# Patient Record
Sex: FEMALE | Race: WHITE | Employment: UNEMPLOYED | URBAN - METROPOLITAN AREA
[De-identification: names, ages, dates, MRNs, and addresses within clinical notes are randomized per-mention and may not be internally consistent; named-entity substitution may affect disease eponyms.]

---

## 2017-09-01 ENCOUNTER — OFFICE VISIT (OUTPATIENT)
Dept: FAMILY MEDICINE CLINIC | Age: 22
End: 2017-09-01

## 2017-09-01 VITALS
RESPIRATION RATE: 16 BRPM | TEMPERATURE: 97.9 F | HEART RATE: 89 BPM | BODY MASS INDEX: 36.75 KG/M2 | HEIGHT: 65 IN | DIASTOLIC BLOOD PRESSURE: 81 MMHG | OXYGEN SATURATION: 98 % | WEIGHT: 220.6 LBS | SYSTOLIC BLOOD PRESSURE: 130 MMHG

## 2017-09-01 DIAGNOSIS — J45.20 MILD INTERMITTENT ASTHMA WITHOUT COMPLICATION: ICD-10-CM

## 2017-09-01 DIAGNOSIS — E55.9 UNSPECIFIED VITAMIN D DEFICIENCY: Primary | ICD-10-CM

## 2017-09-01 DIAGNOSIS — G89.29 CHRONIC MIDLINE LOW BACK PAIN, WITH SCIATICA PRESENCE UNSPECIFIED: Primary | ICD-10-CM

## 2017-09-01 DIAGNOSIS — Z00.00 ANNUAL PHYSICAL EXAM: ICD-10-CM

## 2017-09-01 DIAGNOSIS — M54.5 CHRONIC MIDLINE LOW BACK PAIN, WITH SCIATICA PRESENCE UNSPECIFIED: Primary | ICD-10-CM

## 2017-09-01 PROCEDURE — 99203 OFFICE O/P NEW LOW 30 MIN: CPT | Performed by: NURSE PRACTITIONER

## 2017-09-01 RX ORDER — ALBUTEROL SULFATE 90 UG/1
2 AEROSOL, METERED RESPIRATORY (INHALATION) EVERY 6 HOURS PRN
Qty: 1 INHALER | Refills: 3 | Status: SHIPPED | OUTPATIENT
Start: 2017-09-01

## 2017-09-01 RX ORDER — METFORMIN HYDROCHLORIDE 500 MG/1
500 TABLET, EXTENDED RELEASE ORAL
COMMUNITY
Start: 2017-06-14 | End: 2017-09-01 | Stop reason: ALTCHOICE

## 2017-09-01 RX ORDER — DOCOSANOL 100 MG/G
CREAM TOPICAL
COMMUNITY
Start: 2017-01-08 | End: 2017-09-01 | Stop reason: ALTCHOICE

## 2017-09-01 RX ORDER — ETONOGESTREL AND ETHINYL ESTRADIOL 11.7; 2.7 MG/1; MG/1
1 INSERT, EXTENDED RELEASE VAGINAL
COMMUNITY
Start: 2017-05-19 | End: 2017-09-01 | Stop reason: ALTCHOICE

## 2017-09-01 RX ORDER — VALACYCLOVIR HYDROCHLORIDE 1 G/1
2000 TABLET, FILM COATED ORAL
COMMUNITY
Start: 2017-09-01 | End: 2017-09-01 | Stop reason: ALTCHOICE

## 2017-09-01 RX ORDER — DIPHENHYDRAMINE HYDROCHLORIDE 25 MG/1
25 CAPSULE ORAL
COMMUNITY
Start: 2016-06-15 | End: 2017-09-01 | Stop reason: ALTCHOICE

## 2017-09-01 RX ORDER — BACLOFEN 10 MG/1
10 TABLET ORAL 3 TIMES DAILY
Qty: 30 TABLET | Refills: 0 | Status: SHIPPED | OUTPATIENT
Start: 2017-09-01 | End: 2017-09-11

## 2017-09-01 RX ORDER — DICLOFENAC SODIUM 75 MG/1
75 TABLET, DELAYED RELEASE ORAL 2 TIMES DAILY
Qty: 30 TABLET | Refills: 1 | Status: SHIPPED | OUTPATIENT
Start: 2017-09-01 | End: 2018-08-08

## 2017-09-01 RX ORDER — BUTALBITAL, ACETAMINOPHEN AND CAFFEINE 50; 325; 40 MG/1; MG/1; MG/1
TABLET ORAL
COMMUNITY
Start: 2016-10-31 | End: 2017-09-01 | Stop reason: ALTCHOICE

## 2017-09-01 RX ORDER — ALBUTEROL SULFATE 90 UG/1
1 AEROSOL, METERED RESPIRATORY (INHALATION)
COMMUNITY
Start: 2016-04-08 | End: 2017-09-01 | Stop reason: ALTCHOICE

## 2017-09-01 RX ORDER — MULTIVITAMIN
1 TABLET ORAL
COMMUNITY
End: 2017-09-01 | Stop reason: ALTCHOICE

## 2017-09-01 RX ORDER — IBUPROFEN 600 MG/1
600 TABLET ORAL
COMMUNITY
Start: 2017-01-04 | End: 2017-09-01 | Stop reason: ALTCHOICE

## 2017-09-01 RX ORDER — SERTRALINE HYDROCHLORIDE 25 MG/1
25 TABLET, FILM COATED ORAL
COMMUNITY
Start: 2017-02-13 | End: 2017-09-01

## 2017-09-01 ASSESSMENT — ENCOUNTER SYMPTOMS
ALLERGIC/IMMUNOLOGIC NEGATIVE: 1
RESPIRATORY NEGATIVE: 1
WHEEZING: 0
ABDOMINAL PAIN: 0
EYES NEGATIVE: 1
SHORTNESS OF BREATH: 0
GASTROINTESTINAL NEGATIVE: 1
BOWEL INCONTINENCE: 0
BACK PAIN: 1
CHEST TIGHTNESS: 0

## 2017-09-01 ASSESSMENT — PATIENT HEALTH QUESTIONNAIRE - PHQ9
SUM OF ALL RESPONSES TO PHQ QUESTIONS 1-9: 1
2. FEELING DOWN, DEPRESSED OR HOPELESS: 1
SUM OF ALL RESPONSES TO PHQ9 QUESTIONS 1 & 2: 1

## 2017-09-15 ENCOUNTER — OFFICE VISIT (OUTPATIENT)
Dept: FAMILY MEDICINE CLINIC | Age: 22
End: 2017-09-15

## 2017-09-15 VITALS
OXYGEN SATURATION: 96 % | DIASTOLIC BLOOD PRESSURE: 86 MMHG | HEART RATE: 86 BPM | SYSTOLIC BLOOD PRESSURE: 112 MMHG | BODY MASS INDEX: 36.65 KG/M2 | HEIGHT: 65 IN | WEIGHT: 220 LBS

## 2017-09-15 DIAGNOSIS — R10.31 RIGHT LOWER QUADRANT ABDOMINAL PAIN: ICD-10-CM

## 2017-09-15 DIAGNOSIS — R10.31 RIGHT LOWER QUADRANT ABDOMINAL PAIN: Primary | ICD-10-CM

## 2017-09-15 DIAGNOSIS — R19.7 DIARRHEA, UNSPECIFIED TYPE: ICD-10-CM

## 2017-09-15 LAB
A/G RATIO: 2 (ref 1.1–2.2)
ALBUMIN SERPL-MCNC: 4.4 G/DL (ref 3.4–5)
ALP BLD-CCNC: 104 U/L (ref 40–129)
ALT SERPL-CCNC: 14 U/L (ref 10–40)
AMYLASE: 53 U/L (ref 25–115)
ANION GAP SERPL CALCULATED.3IONS-SCNC: 14 MMOL/L (ref 3–16)
AST SERPL-CCNC: 14 U/L (ref 15–37)
BILIRUB SERPL-MCNC: 0.3 MG/DL (ref 0–1)
BUN BLDV-MCNC: 10 MG/DL (ref 7–20)
CALCIUM SERPL-MCNC: 9.6 MG/DL (ref 8.3–10.6)
CHLORIDE BLD-SCNC: 105 MMOL/L (ref 99–110)
CO2: 25 MMOL/L (ref 21–32)
CREAT SERPL-MCNC: 0.6 MG/DL (ref 0.6–1.1)
GFR AFRICAN AMERICAN: >60
GFR NON-AFRICAN AMERICAN: >60
GLOBULIN: 2.2 G/DL
GLUCOSE BLD-MCNC: 86 MG/DL (ref 70–99)
HCT VFR BLD CALC: 41.2 % (ref 36–48)
HEMOGLOBIN: 13.6 G/DL (ref 12–16)
LIPASE: 25 U/L (ref 13–60)
MCH RBC QN AUTO: 28.8 PG (ref 26–34)
MCHC RBC AUTO-ENTMCNC: 33 G/DL (ref 31–36)
MCV RBC AUTO: 87.3 FL (ref 80–100)
PDW BLD-RTO: 14.3 % (ref 12.4–15.4)
PLATELET # BLD: 171 K/UL (ref 135–450)
PMV BLD AUTO: 10.1 FL (ref 5–10.5)
POTASSIUM SERPL-SCNC: 4.4 MMOL/L (ref 3.5–5.1)
RBC # BLD: 4.72 M/UL (ref 4–5.2)
SODIUM BLD-SCNC: 144 MMOL/L (ref 136–145)
TOTAL PROTEIN: 6.6 G/DL (ref 6.4–8.2)
WBC # BLD: 6.1 K/UL (ref 4–11)

## 2017-09-15 PROCEDURE — 99214 OFFICE O/P EST MOD 30 MIN: CPT | Performed by: NURSE PRACTITIONER

## 2017-09-15 RX ORDER — PROMETHAZINE HYDROCHLORIDE 25 MG/1
25 TABLET ORAL EVERY 6 HOURS PRN
Qty: 30 TABLET | Refills: 0 | Status: SHIPPED | OUTPATIENT
Start: 2017-09-15 | End: 2017-09-22

## 2017-09-15 ASSESSMENT — ENCOUNTER SYMPTOMS
ABDOMINAL PAIN: 1
NAUSEA: 1
EYES NEGATIVE: 1
ALLERGIC/IMMUNOLOGIC NEGATIVE: 1
FLATUS: 1
VOMITING: 0
DIARRHEA: 1
RESPIRATORY NEGATIVE: 1
BLOATING: 1

## 2017-09-22 ENCOUNTER — OFFICE VISIT (OUTPATIENT)
Dept: FAMILY MEDICINE CLINIC | Age: 22
End: 2017-09-22

## 2017-09-22 VITALS
RESPIRATION RATE: 16 BRPM | WEIGHT: 224.6 LBS | BODY MASS INDEX: 37.42 KG/M2 | OXYGEN SATURATION: 98 % | HEART RATE: 84 BPM | HEIGHT: 65 IN | SYSTOLIC BLOOD PRESSURE: 103 MMHG | TEMPERATURE: 98 F | DIASTOLIC BLOOD PRESSURE: 76 MMHG

## 2017-09-22 DIAGNOSIS — R10.31 RIGHT LOWER QUADRANT ABDOMINAL PAIN: Primary | ICD-10-CM

## 2017-09-22 PROCEDURE — 99213 OFFICE O/P EST LOW 20 MIN: CPT | Performed by: NURSE PRACTITIONER

## 2017-09-22 ASSESSMENT — ENCOUNTER SYMPTOMS
HEMATOCHEZIA: 0
ABDOMINAL PAIN: 1
CONSTIPATION: 0
DIARRHEA: 0
EYES NEGATIVE: 1
NAUSEA: 0
WHEEZING: 0
RESPIRATORY NEGATIVE: 1
SHORTNESS OF BREATH: 0
ALLERGIC/IMMUNOLOGIC NEGATIVE: 1
BELCHING: 1
CRAMPS: 1
VOMITING: 0
FLATUS: 1

## 2017-09-27 ENCOUNTER — HOSPITAL ENCOUNTER (OUTPATIENT)
Dept: ULTRASOUND IMAGING | Age: 22
Discharge: HOME | End: 2017-09-27
Attending: NURSE PRACTITIONER | Admitting: NURSE PRACTITIONER

## 2017-09-27 DIAGNOSIS — R10.31 RIGHT LOWER QUADRANT ABDOMINAL PAIN: ICD-10-CM

## 2017-09-27 DIAGNOSIS — R19.7 DIARRHEA, UNSPECIFIED TYPE: ICD-10-CM

## 2017-09-27 DIAGNOSIS — R10.31 RIGHT LOWER QUADRANT PAIN: ICD-10-CM

## 2017-10-13 ENCOUNTER — OFFICE VISIT (OUTPATIENT)
Dept: FAMILY MEDICINE CLINIC | Age: 22
End: 2017-10-13

## 2017-10-13 VITALS
HEART RATE: 80 BPM | DIASTOLIC BLOOD PRESSURE: 74 MMHG | RESPIRATION RATE: 16 BRPM | BODY MASS INDEX: 37.94 KG/M2 | OXYGEN SATURATION: 98 % | SYSTOLIC BLOOD PRESSURE: 108 MMHG | WEIGHT: 227.7 LBS | HEIGHT: 65 IN | TEMPERATURE: 97.8 F

## 2017-10-13 DIAGNOSIS — R10.10 UPPER ABDOMINAL PAIN: Primary | ICD-10-CM

## 2017-10-13 PROCEDURE — 99213 OFFICE O/P EST LOW 20 MIN: CPT | Performed by: NURSE PRACTITIONER

## 2017-10-13 RX ORDER — METFORMIN HYDROCHLORIDE 500 MG/1
500 TABLET, FILM COATED, EXTENDED RELEASE ORAL
COMMUNITY
Start: 2017-06-14 | End: 2017-10-13 | Stop reason: ALTCHOICE

## 2017-10-13 RX ORDER — PANTOPRAZOLE SODIUM 40 MG/1
40 TABLET, DELAYED RELEASE ORAL DAILY
Qty: 30 TABLET | Refills: 1 | Status: SHIPPED | OUTPATIENT
Start: 2017-10-13 | End: 2018-02-12 | Stop reason: SDUPTHER

## 2017-10-13 ASSESSMENT — ENCOUNTER SYMPTOMS
DIARRHEA: 0
EYES NEGATIVE: 1
ALLERGIC/IMMUNOLOGIC NEGATIVE: 1
COUGH: 0
NAUSEA: 0
SHORTNESS OF BREATH: 0
VOMITING: 0
RESPIRATORY NEGATIVE: 1
CHEST TIGHTNESS: 0
ABDOMINAL PAIN: 1

## 2017-10-13 NOTE — PATIENT INSTRUCTIONS
Patient Education        Abdominal Pain: Care Instructions  Your Care Instructions    Abdominal pain has many possible causes. Some aren't serious and get better on their own in a few days. Others need more testing and treatment. If your pain continues or gets worse, you need to be rechecked and may need more tests to find out what is wrong. You may need surgery to correct the problem. Don't ignore new symptoms, such as fever, nausea and vomiting, urination problems, pain that gets worse, and dizziness. These may be signs of a more serious problem. Your doctor may have recommended a follow-up visit in the next 8 to 12 hours. If you are not getting better, you may need more tests or treatment. The doctor has checked you carefully, but problems can develop later. If you notice any problems or new symptoms, get medical treatment right away. Follow-up care is a key part of your treatment and safety. Be sure to make and go to all appointments, and call your doctor if you are having problems. It's also a good idea to know your test results and keep a list of the medicines you take. How can you care for yourself at home? · Rest until you feel better. · To prevent dehydration, drink plenty of fluids, enough so that your urine is light yellow or clear like water. Choose water and other caffeine-free clear liquids until you feel better. If you have kidney, heart, or liver disease and have to limit fluids, talk with your doctor before you increase the amount of fluids you drink. · If your stomach is upset, eat mild foods, such as rice, dry toast or crackers, bananas, and applesauce. Try eating several small meals instead of two or three large ones. · Wait until 48 hours after all symptoms have gone away before you have spicy foods, alcohol, and drinks that contain caffeine. · Do not eat foods that are high in fat. · Avoid anti-inflammatory medicines such as aspirin, ibuprofen (Advil, Motrin), and naproxen (Aleve).

## 2017-10-24 ENCOUNTER — OFFICE VISIT (OUTPATIENT)
Dept: FAMILY MEDICINE CLINIC | Age: 22
End: 2017-10-24

## 2017-10-24 VITALS
HEIGHT: 65 IN | WEIGHT: 224 LBS | OXYGEN SATURATION: 98 % | SYSTOLIC BLOOD PRESSURE: 130 MMHG | BODY MASS INDEX: 37.32 KG/M2 | HEART RATE: 76 BPM | DIASTOLIC BLOOD PRESSURE: 84 MMHG

## 2017-10-24 DIAGNOSIS — J01.90 ACUTE BACTERIAL SINUSITIS: Primary | ICD-10-CM

## 2017-10-24 DIAGNOSIS — B96.89 ACUTE BACTERIAL SINUSITIS: Primary | ICD-10-CM

## 2017-10-24 PROCEDURE — 1036F TOBACCO NON-USER: CPT | Performed by: NURSE PRACTITIONER

## 2017-10-24 PROCEDURE — G8417 CALC BMI ABV UP PARAM F/U: HCPCS | Performed by: NURSE PRACTITIONER

## 2017-10-24 PROCEDURE — G8427 DOCREV CUR MEDS BY ELIG CLIN: HCPCS | Performed by: NURSE PRACTITIONER

## 2017-10-24 PROCEDURE — 99214 OFFICE O/P EST MOD 30 MIN: CPT | Performed by: NURSE PRACTITIONER

## 2017-10-24 PROCEDURE — G8484 FLU IMMUNIZE NO ADMIN: HCPCS | Performed by: NURSE PRACTITIONER

## 2017-10-24 RX ORDER — AZITHROMYCIN 250 MG/1
TABLET, FILM COATED ORAL
Qty: 1 PACKET | Refills: 0 | Status: SHIPPED | OUTPATIENT
Start: 2017-10-24 | End: 2017-11-03

## 2017-10-24 RX ORDER — ONDANSETRON 4 MG/1
4 TABLET, FILM COATED ORAL DAILY PRN
Qty: 30 TABLET | Refills: 3 | Status: SHIPPED | OUTPATIENT
Start: 2017-10-24 | End: 2018-02-12 | Stop reason: ALTCHOICE

## 2017-10-24 RX ORDER — BENZONATATE 100 MG/1
100 CAPSULE ORAL 3 TIMES DAILY PRN
Qty: 30 CAPSULE | Refills: 1 | Status: SHIPPED | OUTPATIENT
Start: 2017-10-24 | End: 2017-11-10 | Stop reason: ALTCHOICE

## 2017-10-24 ASSESSMENT — ENCOUNTER SYMPTOMS
SHORTNESS OF BREATH: 0
HEARTBURN: 0
COUGH: 1
SORE THROAT: 0
SINUS PAIN: 1

## 2017-10-24 NOTE — PROGRESS NOTES
Subjective:      Chief Complaint   Patient presents with    Nausea & Vomiting     diarrhea    Chest Congestion       Patient ID: Ludmila Rivas is a 23 y.o. female who presents for     HPI    Family History   Problem Relation Age of Onset    Substance Abuse Mother     Asthma Mother     Substance Abuse Father     Heart Attack Maternal Grandmother     Heart Disease Maternal Grandmother     High Blood Pressure Maternal Grandmother     No Known Problems Maternal Grandfather     High Blood Pressure Paternal Grandmother     Heart Disease Paternal Grandmother     Stroke Paternal Grandmother     Atrial Fibrillation Paternal Grandmother     Heart Attack Paternal Grandmother     No Known Problems Paternal Grandfather     Breast Cancer Neg Hx     Colon Cancer Neg Hx     Diabetes Neg Hx        Social History     Social History    Marital status: Single     Spouse name: N/A    Number of children: N/A    Years of education: N/A     Occupational History    Not on file. Social History Main Topics    Smoking status: Never Smoker    Smokeless tobacco: Never Used    Alcohol use No    Drug use: No    Sexual activity: Yes     Partners: Male      Comment: condoms     Other Topics Concern    Not on file     Social History Narrative    No narrative on file       Current Outpatient Prescriptions on File Prior to Visit   Medication Sig Dispense Refill    pantoprazole (PROTONIX) 40 MG tablet Take 1 tablet by mouth daily 30 tablet 1    albuterol sulfate HFA (VENTOLIN HFA) 108 (90 Base) MCG/ACT inhaler Inhale 2 puffs into the lungs every 6 hours as needed for Wheezing 1 Inhaler 3    diclofenac (VOLTAREN) 75 MG EC tablet Take 1 tablet by mouth 2 times daily 30 tablet 1     No current facility-administered medications on file prior to visit. ROS    Objective:     Physical Exam    Assessment:     No diagnosis found. Plan:     No orders of the defined types were placed in this encounter.       No orders of the defined types were placed in this encounter.

## 2017-10-24 NOTE — PATIENT INSTRUCTIONS
Thank you for enrolling in [address] Please follow the instructions below to securely access your online medical record. [employer] allows you to send messages to your doctor, view your test results, renew your prescriptions, schedule appointments, and more. How Do I Sign Up? 1. In your Internet browser, go to [email or web address]  2. Click on the Sign Up Now link in the Sign In box. You will see the New Member Sign Up page. 3. Enter your [employer] Access Code exactly as it appears below. You will not need to use this code after youve completed the sign-up process. If you do not sign up before the expiration date, you must request a new code. [employer] Access Code: Activation code not generated  Current [employer] Status: Active    4. Enter your Social Security Number ([id number]) and Date of Birth (mm/dd/yyyy) as indicated and click Submit. You will be taken to the next sign-up page. 5. Create a [employer] ID. This will be your [employer] login ID and cannot be changed, so think of one that is secure and easy to remember. 6. Create a [employer] password. You can change your password at any time. 7. Enter your Password Reset Question and Answer. This can be used at a later time if you forget your password. 8. Enter your e-mail address. You will receive e-mail notification when new information is available in [address] 9. Click Sign Up. You can now view your medical record. Additional Information  If you have questions, please contact your physician practice where you receive care. Remember, [employer] is NOT to be used for urgent needs. For medical emergencies, dial 911.

## 2017-10-24 NOTE — PROGRESS NOTES
File Prior to Visit   Medication Sig Dispense Refill    pantoprazole (PROTONIX) 40 MG tablet Take 1 tablet by mouth daily 30 tablet 1    albuterol sulfate HFA (VENTOLIN HFA) 108 (90 Base) MCG/ACT inhaler Inhale 2 puffs into the lungs every 6 hours as needed for Wheezing 1 Inhaler 3    diclofenac (VOLTAREN) 75 MG EC tablet Take 1 tablet by mouth 2 times daily 30 tablet 1     No current facility-administered medications on file prior to visit. Review of Systems   Constitutional: Negative for chills and fever. HENT: Positive for congestion, postnasal drip and sinus pain. Negative for ear pain and sore throat. Sinus pain at maxillary and frontal lobes   Respiratory: Positive for cough. Negative for shortness of breath. History of asthma treated with albuterol   Cardiovascular: Negative for chest pain. Gastrointestinal: Negative for heartburn. Genitourinary: Negative. Musculoskeletal: Positive for myalgias (Treated withdiclofenac). Neurological: Positive for headaches. Psychiatric/Behavioral: Negative. Objective:     Physical Exam   Constitutional: She is oriented to person, place, and time. She appears well-developed and well-nourished. HENT:   Head: Normocephalic and atraumatic. Right Ear: External ear normal.   Left Ear: External ear normal.   Nose: Nose normal.   Mouth/Throat: Oropharynx is clear and moist.   Eyes: Conjunctivae and EOM are normal. Pupils are equal, round, and reactive to light. Right eye exhibits no discharge. Left eye exhibits no discharge. No scleral icterus. Sinus pressure pain at maxillary and frontal lobes   Neck: Normal range of motion. Neck supple. Cardiovascular: Normal rate, regular rhythm and normal heart sounds. Exam reveals no gallop and no friction rub. No murmur heard. Pulmonary/Chest: Effort normal and breath sounds normal. She has no wheezes. She has no rales. Abdominal: Soft. Musculoskeletal: Normal range of motion. Neurological: She is alert and oriented to person, place, and time. Skin: Skin is warm and dry. Psychiatric: She has a normal mood and affect. Her behavior is normal. Judgment and thought content normal.       Assessment:       ICD-10-CM ICD-9-CM    1.  Acute bacterial sinusitis J01.90 461.9     B96.89           Plan:     Zithromax  Tessalon Perles for cough  Mucinex or equivalent  Zofran for nausea  Call on Friday if symptoms do not cem  Follow-up with Sanjay Browne nurse practitioner

## 2017-11-01 ENCOUNTER — TELEPHONE (OUTPATIENT)
Dept: FAMILY MEDICINE CLINIC | Age: 22
End: 2017-11-01

## 2017-11-01 RX ORDER — AMOXICILLIN AND CLAVULANATE POTASSIUM 875; 125 MG/1; MG/1
1 TABLET, FILM COATED ORAL 2 TIMES DAILY
Qty: 20 TABLET | Refills: 0 | Status: SHIPPED | OUTPATIENT
Start: 2017-11-01 | End: 2017-11-10 | Stop reason: ALTCHOICE

## 2017-11-01 NOTE — TELEPHONE ENCOUNTER
Patient stated that the z-pack has not cleared up stated that when she woke up this morning she was still congested

## 2017-11-01 NOTE — TELEPHONE ENCOUNTER
She should continue all the other care that [facility] prescribed. I will switch the antibiotic and send it to  pharmacy. IF you are not better after this she should make an appointment to see me.

## 2017-11-10 ENCOUNTER — OFFICE VISIT (OUTPATIENT)
Dept: FAMILY MEDICINE CLINIC | Age: 22
End: 2017-11-10

## 2017-11-10 VITALS
RESPIRATION RATE: 16 BRPM | TEMPERATURE: 97.6 F | BODY MASS INDEX: 37.32 KG/M2 | HEART RATE: 97 BPM | SYSTOLIC BLOOD PRESSURE: 119 MMHG | WEIGHT: 224 LBS | OXYGEN SATURATION: 98 % | DIASTOLIC BLOOD PRESSURE: 76 MMHG | HEIGHT: 65 IN

## 2017-11-10 DIAGNOSIS — K21.9 GASTROESOPHAGEAL REFLUX DISEASE, ESOPHAGITIS PRESENCE NOT SPECIFIED: ICD-10-CM

## 2017-11-10 DIAGNOSIS — R10.11 RIGHT UPPER QUADRANT ABDOMINAL PAIN: Primary | ICD-10-CM

## 2017-11-10 PROCEDURE — 99213 OFFICE O/P EST LOW 20 MIN: CPT | Performed by: NURSE PRACTITIONER

## 2017-11-10 PROCEDURE — 1036F TOBACCO NON-USER: CPT | Performed by: NURSE PRACTITIONER

## 2017-11-10 PROCEDURE — G8417 CALC BMI ABV UP PARAM F/U: HCPCS | Performed by: NURSE PRACTITIONER

## 2017-11-10 PROCEDURE — G8427 DOCREV CUR MEDS BY ELIG CLIN: HCPCS | Performed by: NURSE PRACTITIONER

## 2017-11-10 PROCEDURE — G8484 FLU IMMUNIZE NO ADMIN: HCPCS | Performed by: NURSE PRACTITIONER

## 2017-11-10 RX ORDER — RANITIDINE 150 MG/1
150 TABLET ORAL 2 TIMES DAILY
Qty: 60 TABLET | Refills: 3 | Status: SHIPPED | OUTPATIENT
Start: 2017-11-10 | End: 2018-08-08

## 2017-11-10 ASSESSMENT — ENCOUNTER SYMPTOMS
CONSTIPATION: 0
RESPIRATORY NEGATIVE: 1
ABDOMINAL PAIN: 1
EYES NEGATIVE: 1
BLOOD IN STOOL: 0
NAUSEA: 0
ALLERGIC/IMMUNOLOGIC NEGATIVE: 1
DIARRHEA: 0
VOMITING: 0

## 2017-11-10 NOTE — PROGRESS NOTES
The medications were discussed with the patient and the physician. Prescription and over the counter medicines reviewed. Pt is taking medication as prescribed  any side effects or barriers such as difficulty in taking the medication reveiwed. The purpose, side effects, dose of medication of new medications were explained to the patient and questions answered by the physician. The patients understands the information concerning medication. Individual treatment plan developed:Self-management plan and goals developed and discussed. Resources given. See patient instructions. Medication treatment plan developed by the physician. See orders. Healthy behavior discussed: Preventative behaviors discussed regarding healthy diet, exercise, and not smoking. .    Self management confidence of patient being able to put goal into action assessed: medium confidence. Barriers to treatment evaluated:none unless otherwise noted in the HPI.

## 2017-11-10 NOTE — PROGRESS NOTES
11/10/2017    This is a 23 y.o. female   Chief Complaint   Patient presents with    Gastroesophageal Reflux    Abdominal Pain     still there but getting better    . Dora Mauricio is here for recurrent abdomen pain. She notes that the symptoms are improving since starting the protonix. Her worst sugars are spicy and acidic foods. There is no diarrhea nausea or vomiting. There is no blood in her stool. The frequency of pains has also decreased. Patient Active Problem List   Diagnosis    High-risk pregnancy    Bilateral polycystic ovarian syndrome    Need for rhogam due to Rh negative mother    Chlamydia    Migraine headache    Screening for malignant neoplasm of cervix    Vitamin D deficiency       Current Outpatient Prescriptions   Medication Sig Dispense Refill    ranitidine (ZANTAC) 150 MG tablet Take 1 tablet by mouth 2 times daily 60 tablet 3    ondansetron (ZOFRAN) 4 MG tablet Take 1 tablet by mouth daily as needed for Nausea or Vomiting 30 tablet 3    pantoprazole (PROTONIX) 40 MG tablet Take 1 tablet by mouth daily 30 tablet 1    albuterol sulfate HFA (VENTOLIN HFA) 108 (90 Base) MCG/ACT inhaler Inhale 2 puffs into the lungs every 6 hours as needed for Wheezing 1 Inhaler 3    diclofenac (VOLTAREN) 75 MG EC tablet Take 1 tablet by mouth 2 times daily 30 tablet 1     No current facility-administered medications for this visit. Allergies   Allergen Reactions    No Known Allergies        /76 (Site: Left Arm, Position: Sitting, Cuff Size: Medium Adult)   Pulse 97   Temp 97.6 °F (36.4 °C) (Oral)   Resp 16   Ht 5' 5\" (1.651 m)   Wt 224 lb (101.6 kg)   SpO2 98%   Breastfeeding? No   BMI 37.28 kg/m²     Social History   Substance Use Topics    Smoking status: Never Smoker    Smokeless tobacco: Never Used    Alcohol use No       Review of Systems   Constitutional: Negative. HENT: Negative. Eyes: Negative. Respiratory: Negative. Cardiovascular: Negative.

## 2017-11-22 ENCOUNTER — OFFICE VISIT (OUTPATIENT)
Dept: FAMILY MEDICINE CLINIC | Age: 22
End: 2017-11-22

## 2017-11-22 VITALS
WEIGHT: 224 LBS | BODY MASS INDEX: 37.32 KG/M2 | TEMPERATURE: 98 F | OXYGEN SATURATION: 98 % | HEART RATE: 88 BPM | SYSTOLIC BLOOD PRESSURE: 115 MMHG | DIASTOLIC BLOOD PRESSURE: 74 MMHG | RESPIRATION RATE: 16 BRPM | HEIGHT: 65 IN

## 2017-11-22 DIAGNOSIS — F51.5 NIGHTMARES: ICD-10-CM

## 2017-11-22 DIAGNOSIS — F41.9 ANXIETY: Primary | ICD-10-CM

## 2017-11-22 PROCEDURE — G8427 DOCREV CUR MEDS BY ELIG CLIN: HCPCS | Performed by: NURSE PRACTITIONER

## 2017-11-22 PROCEDURE — G8417 CALC BMI ABV UP PARAM F/U: HCPCS | Performed by: NURSE PRACTITIONER

## 2017-11-22 PROCEDURE — G8484 FLU IMMUNIZE NO ADMIN: HCPCS | Performed by: NURSE PRACTITIONER

## 2017-11-22 PROCEDURE — 99213 OFFICE O/P EST LOW 20 MIN: CPT | Performed by: NURSE PRACTITIONER

## 2017-11-22 PROCEDURE — 1036F TOBACCO NON-USER: CPT | Performed by: NURSE PRACTITIONER

## 2017-11-22 RX ORDER — SERTRALINE HYDROCHLORIDE 25 MG/1
25 TABLET, FILM COATED ORAL DAILY
Qty: 30 TABLET | Refills: 0 | Status: SHIPPED | OUTPATIENT
Start: 2017-11-22 | End: 2018-01-03 | Stop reason: SDUPTHER

## 2017-11-22 ASSESSMENT — ENCOUNTER SYMPTOMS
EYES NEGATIVE: 1
GASTROINTESTINAL NEGATIVE: 1
RESPIRATORY NEGATIVE: 1

## 2017-11-22 NOTE — PATIENT INSTRUCTIONS
Start zoloft- daily  Patient Education          sertraline  Pronunciation:  SER tra darrell  Brand:  Zoloft  What is the most important information I should know about sertraline? You should not use sertraline if you also take pimozide, or if you are being treated with methylene blue injection. Do not use sertraline if you have taken an MAO inhibitor in the past 14 days. A dangerous drug interaction could occur. MAO inhibitors include isocarboxazid, linezolid, phenelzine, rasagiline, selegiline, and tranylcypromine. Some young people have thoughts about suicide when first taking an antidepressant. Stay alert to changes in your mood or symptoms. Report any new or worsening symptoms to your doctor. Do not give sertraline to anyone younger than 25years old without the advice of a doctor. Sertraline is FDA-approved for children with obsessive-compulsive disorder (OCD). It is not approved for treating depression in children. What is sertraline? Sertraline is an antidepressant in a group of drugs called selective serotonin reuptake inhibitors (SSRIs). Sertraline affects chemicals in the brain that may be unbalanced in people with depression, panic, anxiety, or obsessive-compulsive symptoms. Sertraline is used to treat depression, obsessive-compulsive disorder, panic disorder, anxiety disorders, post-traumatic stress disorder (PTSD), and premenstrual dysphoric disorder (PMDD). Sertraline may also be used for purposes not listed in this medication guide. What should I discuss with my healthcare provider before taking sertraline? You should not use sertraline if you are allergic to it, if you also take pimozide, or if you are being treated with methylene blue injection. Do not use sertraline if you have taken an MAO inhibitor in the past 14 days. A dangerous drug interaction could occur. MAO inhibitors include isocarboxazid, linezolid, phenelzine, rasagiline, selegiline, and tranylcypromine.  After you stop taking sertraline, you must wait at least 14 days before you start taking an MAOI. To make sure sertraline is safe for you, tell your doctor if you have:  · liver or kidney disease;  · seizures or epilepsy;  · a bleeding or blood clotting disorder;  · bipolar disorder (manic depression); or  · a history of drug abuse or suicidal thoughts. Some young people have thoughts about suicide when first taking an antidepressant. Your doctor should check your progress at regular visits. Your family or other caregivers should also be alert to changes in your mood or symptoms. Taking an SSRI antidepressant during pregnancy may cause serious lung problems or other complications in the baby. However, you may have a relapse of depression if you stop taking your antidepressant. Tell your doctor right away if you become pregnant. Do not start or stop taking this medicine during pregnancy without your doctor's advice. It is not known whether sertraline passes into breast milk or if it could harm a nursing baby. Tell your doctor if you are breast-feeding a baby. Do not give sertraline to anyone younger than 25years old without the advice of a doctor. Sertraline is FDA-approved for children with obsessive-compulsive disorder (OCD). It is not approved for treating depression in children. How should I take sertraline? Follow all directions on your prescription label. Your doctor may occasionally change your dose to make sure you get the best results. Do not take this medicine in larger or smaller amounts or for longer than recommended. Sertraline may be taken with or without food. Try to take the medicine at the same time each day. The liquid (oral concentrate) form of sertraline must be diluted before you take it. To be sure you get the correct dose, measure the liquid with the medicine dropper provided. Mix the dose with 4 ounces (one-half cup) of water, ginger ale, lemon/lime soda, lemonade, or orange juice.  Do not use any other liquids to dilute the medicine. Stir this mixture and drink all of it right away. To make sure you get the entire dose, add a little more water to the same glass, swirl gently and drink right away. This medicine can cause you to have a false positive drug screening test. If you provide a urine sample for drug screening, tell the laboratory staff that you are taking sertraline. It may take up to 4 weeks before your symptoms improve. Keep using the medication as directed and tell your doctor if your symptoms do not improve. Do not stop using sertraline suddenly, or you could have unpleasant withdrawal symptoms. Ask your doctor how to safely stop using sertraline. Store at room temperature away from moisture and heat. What happens if I miss a dose? Take the missed dose as soon as you remember. Skip the missed dose if it is almost time for your next scheduled dose. Do not take extra medicine to make up the missed dose. What happens if I overdose? Seek emergency medical attention or call the Poison Help line at 1-882.996.2847. What should I avoid while taking sertraline? Ask your doctor before taking a nonsteroidal anti-inflammatory drug (NSAID) for pain, arthritis, fever, or swelling. This includes aspirin, ibuprofen (Advil, Motrin), naproxen (Aleve), celecoxib (Celebrex), diclofenac, indomethacin, meloxicam, and others. Using an NSAID with sertraline may cause you to bruise or bleed easily. Drinking alcohol can increase certain side effects of sertraline. Do not take the liquid form of sertraline if you are taking disulfiram (Antabuse). Liquid sertraline may contain alcohol and you could have a severe reaction to the disulfiram.  This medication may impair your thinking or reactions. Be careful if you drive or do anything that requires you to be alert. What are the possible side effects of sertraline?   Get emergency medical help if you have signs of an allergic reaction: skin rash or hives (with or without --warfarin, Coumadin, Jantoven;  · heart rhythm medicine --digoxin, flecainide, propafenone, and others;  · medicine to treat anxiety, mood disorders, or mental illness such as schizophrenia --diazepam, lithium, valproate, and others; or  · migraine headache medicines --sumatriptan, zolmitriptan, and others. This list is not complete. Other drugs may interact with sertraline, including prescription and over-the-counter medicines, vitamins, and herbal products. Not all possible interactions are listed in this medication guide. Where can I get more information? Your pharmacist can provide more information about sertraline. Remember, keep this and all other medicines out of the reach of children, never share your medicines with others, and use this medication only for the indication prescribed. Every effort has been made to ensure that the information provided by [address] is accurate, up-to-date, and complete, but no guarantee is made to that effect. Drug information contained herein may be time sensitive. [facility] information has been compiled for use by healthcare practitioners and consumers in the [place] and therefore [facility] does not warrant that uses outside of the [place] are appropriate, unless specifically indicated otherwise. [facility]'s drug information does not endorse drugs, diagnose patients or recommend therapy. [facility] drug information is an informational resource designed to assist licensed healthcare practitioners in caring for their patients and/or to serve consumers viewing this service as a supplement to, and not a substitute for, the expertise, skill, knowledge and judgment of healthcare practitioners. The absence of a warning for a given drug or drug combination in no way should be construed to indicate that the drug or drug combination is safe, effective or appropriate for any given patient.  [facility] does not assume any responsibility for any aspect of

## 2018-01-03 ENCOUNTER — OFFICE VISIT (OUTPATIENT)
Dept: FAMILY MEDICINE CLINIC | Age: 23
End: 2018-01-03

## 2018-01-03 VITALS
BODY MASS INDEX: 40.39 KG/M2 | TEMPERATURE: 98.1 F | HEIGHT: 65 IN | SYSTOLIC BLOOD PRESSURE: 106 MMHG | HEART RATE: 93 BPM | WEIGHT: 242.4 LBS | DIASTOLIC BLOOD PRESSURE: 72 MMHG | OXYGEN SATURATION: 97 % | RESPIRATION RATE: 16 BRPM

## 2018-01-03 DIAGNOSIS — B00.1 COLD SORE: ICD-10-CM

## 2018-01-03 DIAGNOSIS — R45.89 DEPRESSED MOOD: Primary | ICD-10-CM

## 2018-01-03 PROCEDURE — G8484 FLU IMMUNIZE NO ADMIN: HCPCS | Performed by: NURSE PRACTITIONER

## 2018-01-03 PROCEDURE — G8427 DOCREV CUR MEDS BY ELIG CLIN: HCPCS | Performed by: NURSE PRACTITIONER

## 2018-01-03 PROCEDURE — G8417 CALC BMI ABV UP PARAM F/U: HCPCS | Performed by: NURSE PRACTITIONER

## 2018-01-03 PROCEDURE — 1036F TOBACCO NON-USER: CPT | Performed by: NURSE PRACTITIONER

## 2018-01-03 PROCEDURE — 99213 OFFICE O/P EST LOW 20 MIN: CPT | Performed by: NURSE PRACTITIONER

## 2018-01-03 RX ORDER — VALACYCLOVIR HYDROCHLORIDE 1 G/1
1000 TABLET, FILM COATED ORAL 2 TIMES DAILY
Qty: 20 TABLET | Refills: 0 | Status: SHIPPED | OUTPATIENT
Start: 2018-01-03 | End: 2018-01-13

## 2018-01-03 RX ORDER — SERTRALINE HYDROCHLORIDE 25 MG/1
25 TABLET, FILM COATED ORAL DAILY
Qty: 30 TABLET | Refills: 0 | Status: SHIPPED | OUTPATIENT
Start: 2018-01-03 | End: 2018-01-25 | Stop reason: ALTCHOICE

## 2018-01-03 ASSESSMENT — ENCOUNTER SYMPTOMS
EYES NEGATIVE: 1
RESPIRATORY NEGATIVE: 1
GASTROINTESTINAL NEGATIVE: 1
ALLERGIC/IMMUNOLOGIC NEGATIVE: 1

## 2018-01-03 NOTE — PROGRESS NOTES
The medications were discussed with the patient and the physician. Prescription and over the counter medicines reviewed. Pt is taking medication as prescribed  any side effects or barriers such as difficulty in taking the medication reveiwed. The purpose, side effects, dose of medication of new medications were explained to the patient and questions answered by the physician. The patients understands the information concerning medication. Individual treatment plan developed:Self-management plan and goals developed and discussed. Resources given. See patient instructions. Medication treatment plan developed by the physician. See orders. Healthy behavior discussed: Preventative behaviors discussed regarding healthy diet, exercise, and not smoking. Self management confidence of patient being able to put goal into action assessed: medium confidence. Barriers to treatment evaluated:none unless otherwise noted in the HPI.
Lymphadenopathy:     She has no cervical adenopathy. Neurological: She is alert and oriented to person, place, and time. She exhibits normal muscle tone. Coordination normal.   Skin: Skin is warm and dry. No rash noted. She is not diaphoretic. No erythema. No pallor. Psychiatric: Her speech is normal and behavior is normal. Judgment and thought content normal. Her mood appears anxious. Cognition and memory are normal. She exhibits a depressed mood. She expresses no suicidal ideation. She expresses no suicidal plans. Some dark thoughts, no plan   Nursing note and vitals reviewed. Diagnosis    ICD-10-CM ICD-9-CM    1. Depressed mood F32.9 311    2. Cold sore B00.1 054. 9         Plan    HSV: valrex called in to pharmacy  Discussed L lysine for prevention    Depression: pt to take medication consistently  Call referral for CBT/counseling  Discussed grief due to the death of her mother    No orders of the defined types were placed in this encounter. Orders Placed This Encounter   Medications    sertraline (ZOLOFT) 25 MG tablet     Sig: Take 1 tablet by mouth daily     Dispense:  30 tablet     Refill:  0    valACYclovir (VALTREX) 1 g tablet     Sig: Take 1 tablet by mouth 2 times daily for 10 days     Dispense:  20 tablet     Refill:  0       Patient Education:  Names of counselors given  Pt to [employer]    Return in about 4 weeks (around 1/31/2018) for Mental health.

## 2018-01-25 ENCOUNTER — OFFICE VISIT (OUTPATIENT)
Dept: FAMILY MEDICINE CLINIC | Age: 23
End: 2018-01-25

## 2018-01-25 VITALS
SYSTOLIC BLOOD PRESSURE: 102 MMHG | WEIGHT: 241.4 LBS | TEMPERATURE: 97.7 F | BODY MASS INDEX: 40.22 KG/M2 | HEART RATE: 83 BPM | OXYGEN SATURATION: 98 % | HEIGHT: 65 IN | RESPIRATION RATE: 16 BRPM | DIASTOLIC BLOOD PRESSURE: 75 MMHG

## 2018-01-25 DIAGNOSIS — R07.9 CHEST PAIN, UNSPECIFIED TYPE: Primary | ICD-10-CM

## 2018-01-25 DIAGNOSIS — Z63.79 STRESS DUE TO ILLNESS OF FAMILY MEMBER: ICD-10-CM

## 2018-01-25 DIAGNOSIS — F43.21 GRIEF REACTION: ICD-10-CM

## 2018-01-25 PROCEDURE — 1036F TOBACCO NON-USER: CPT | Performed by: NURSE PRACTITIONER

## 2018-01-25 PROCEDURE — G8427 DOCREV CUR MEDS BY ELIG CLIN: HCPCS | Performed by: NURSE PRACTITIONER

## 2018-01-25 PROCEDURE — 99213 OFFICE O/P EST LOW 20 MIN: CPT | Performed by: NURSE PRACTITIONER

## 2018-01-25 PROCEDURE — G8484 FLU IMMUNIZE NO ADMIN: HCPCS | Performed by: NURSE PRACTITIONER

## 2018-01-25 PROCEDURE — G8417 CALC BMI ABV UP PARAM F/U: HCPCS | Performed by: NURSE PRACTITIONER

## 2018-01-25 PROCEDURE — 93000 ELECTROCARDIOGRAM COMPLETE: CPT | Performed by: NURSE PRACTITIONER

## 2018-01-25 RX ORDER — PAROXETINE HYDROCHLORIDE 20 MG/1
20 TABLET, FILM COATED ORAL EVERY MORNING
Qty: 30 TABLET | Refills: 0 | Status: SHIPPED | OUTPATIENT
Start: 2018-01-25 | End: 2018-02-27 | Stop reason: SDUPTHER

## 2018-01-25 ASSESSMENT — ENCOUNTER SYMPTOMS
CHEST TIGHTNESS: 1
WHEEZING: 0
COUGH: 0
GASTROINTESTINAL NEGATIVE: 1
EYES NEGATIVE: 1
SHORTNESS OF BREATH: 0
ALLERGIC/IMMUNOLOGIC NEGATIVE: 1

## 2018-01-25 NOTE — PATIENT INSTRUCTIONS
life while you sleep. Missing sleep can lead to illness and make it harder for you to deal with your grief. · Eat healthy foods. Try to avoid eating only foods that give you comfort. Ask someone to join you for a meal if you do not like eating alone. Consider taking a multivitamin every day. · Get some exercise every day. Even a walk can help you deal with your grief. Other exercises, such as yoga, can also help you manage stress. · Comfort yourself. Take time to look at photos or use special items that make you feel better. · Stay involved in your life. Do not withdraw from the activities you enjoy. People you know at work, Scientologist, clubs, or other groups can help you get through your period of grief. · Think about joining a support group to help you deal with your grief. There are many support groups to help people recover from grief. When should you call for help? Call 911 anytime you think you may need emergency care. For example, call if:  ? · You feel you cannot stop from hurting yourself or someone else. ? Watch closely for changes in your health, and be sure to contact your doctor if:  ? · You think you may be depressed. ? · You do not get better as expected. Where can you learn more? Go to https://[facility] CenterpeHumanoid.Quick Hit. org and sign in to your Cherry account. Enter H249 in the Emotion Media box to learn more about \"Grief (Actual/Anticipated): Care Instructions. \"     If you do not have an account, please click on the \"Sign Up Now\" link. Current as of: September 24, 2016  Content Version: 11.5  © 2173-9334 Healthwise, Incorporated. Care instructions adapted under license by [facility] ([facility]). If you have questions about a medical condition or this instruction, always ask your healthcare professional. [place] [address] any warranty or liability for your use of this information.        Patient Education        Grief (Actual/Anticipated): Care Instructions  Your Care deal with your grief. · Eat healthy foods. Try to avoid eating only foods that give you comfort. Ask someone to join you for a meal if you do not like eating alone. Consider taking a multivitamin every day. · Get some exercise every day. Even a walk can help you deal with your grief. Other exercises, such as yoga, can also help you manage stress. · Comfort yourself. Take time to look at photos or use special items that make you feel better. · Stay involved in your life. Do not withdraw from the activities you enjoy. People you know at work, Roman Catholic, clubs, or other groups can help you get through your period of grief. · Think about joining a support group to help you deal with your grief. There are many support groups to help people recover from grief. When should you call for help? Call 911 anytime you think you may need emergency care. For example, call if:  ? · You feel you cannot stop from hurting yourself or someone else. ? Watch closely for changes in your health, and be sure to contact your doctor if:  ? · You think you may be depressed. ? · You do not get better as expected. Where can you learn more? Go to https://FONU2pepiceweb.Immunexpress. org and sign in to your Therabiol account. Enter H249 in the Ditech Communications box to learn more about \"Grief (Actual/Anticipated): Care Instructions. \"     If you do not have an account, please click on the \"Sign Up Now\" link. Current as of: September 24, 2016  Content Version: 11.5  © 4006-2206 Healthwise, Incorporated. Care instructions adapted under license by [facility] ([facility]). If you have questions about a medical condition or this instruction, always ask your healthcare professional. [place] [address] any warranty or liability for your use of this information.

## 2018-01-25 NOTE — PROGRESS NOTES
present. Exam reveals no gallop and no friction rub. No murmur heard. EKG showed normal sinus rhythm   Pulmonary/Chest: Effort normal and breath sounds normal. No respiratory distress. She has no wheezes. She has no rales. Abdominal: Soft. Bowel sounds are normal. She exhibits no distension. There is no tenderness. Musculoskeletal: Normal range of motion. She exhibits no edema or tenderness. Lymphadenopathy:     She has no cervical adenopathy. Neurological: She is alert and oriented to person, place, and time. She exhibits normal muscle tone. Coordination normal.   Skin: Skin is warm and dry. No rash noted. She is not diaphoretic. No erythema. No pallor. Psychiatric: Her speech is normal and behavior is normal. Judgment and thought content normal. Her mood appears anxious. She exhibits a depressed mood. Nursing note and vitals reviewed. Diagnosis    ICD-10-CM ICD-9-CM    1. Chest pain, unspecified type R07.9 786.50 EKG 12 Lead      ECHO Complete 2D W Doppler W Color   2. Grief reaction F43.20 309.0 Internal [facility]erral to [facility]   3. Stress due to illness of family member Z63.79 V61.49 Internal Referral to [facility])        Plan    EKG showed normal sinus rhythm  Patient is under a great deal of stress and anxiety related to multiple stressors. Will try Paxil and sodas Zoloft. Patient is actively seeking counseling  Referral placed to [facility] to help with grief and anxiety issues regarding the death of her mother as well as her child's upcoming surgeries and illnesses. Return in 2 weeks as scheduled for follow-up appointment on medications. If palpitations or chest pain repeats she should seek care in the emergency room  May consider echo.     Orders Placed This Encounter   Procedures    Internal Referral to [facility]     Referral Priority:   Routine     Referral Type:   Consult for Advice and Opinion     Requested Specialty:   Spiritual Services

## 2018-01-26 ENCOUNTER — CLINICAL DOCUMENTATION (OUTPATIENT)
Dept: SPIRITUAL SERVICES | Age: 23
End: 2018-01-26

## 2018-01-29 ENCOUNTER — CLINICAL DOCUMENTATION (OUTPATIENT)
Dept: SPIRITUAL SERVICES | Age: 23
End: 2018-01-29

## 2018-01-30 ENCOUNTER — CLINICAL DOCUMENTATION (OUTPATIENT)
Dept: SPIRITUAL SERVICES | Age: 23
End: 2018-01-30

## 2018-02-02 ENCOUNTER — CLINICAL DOCUMENTATION (OUTPATIENT)
Dept: SPIRITUAL SERVICES | Age: 23
End: 2018-02-02

## 2018-02-02 NOTE — PROGRESS NOTES
mourning the loss of both her mother and grandmother who both  within the last 10 months. The pt's grief for her mother is complicated by the strained relationship they had. However, the pt's grief is deeper for her grandmother, who raised her from age 13 to 27. Objective:       Approachable [x] Sad  [x]   Angry [] Shock   []   Calm [x] Tearful  []   Happy [] Venting Emotion []   Anxious [x] Other []      Assessment:     Emotional Distress, Grief/Loss and Life Adjustment. Meaning/Purpose: Pt derives a strong since of purpose from the care taking of her first and only child. Community: This wasn't really discussed and pt didn't freely mention anything. This  will discuss this with pt at our next meeting. Hope/Peace: Pt takes comfort that her  grandma and little sister are watching over her and her baby. Pt is hopeful that her baby is on the road to recovery. Personal Spirituality and Practices: Pt believes in heaven and was open to this  praying for her over the phone. Effects on Medical Care/ Decision Making: Pt's anxiety and fear are preventing her from taking the prescribed Zoloft. Her depression may be causing her to sleep to much. Her concern over her child is preventing her from seeking counseling. The pt seemed to be encouraged by our conversation and has agreed to meet me following her next visit with her PCP. Plan:       Outpatient Spiritual Care is planning to meet with pt on 18 after her 1pm appointment with Jannie Hudson NP and provide pt with some literature on grief, anxiety and depression at that time. Length of Encounter: 30 MINS.     Complexity of Encounter: HIGH

## 2018-02-09 ENCOUNTER — CLINICAL DOCUMENTATION (OUTPATIENT)
Dept: SPIRITUAL SERVICES | Age: 23
End: 2018-02-09

## 2018-02-12 ENCOUNTER — OFFICE VISIT (OUTPATIENT)
Dept: FAMILY MEDICINE CLINIC | Age: 23
End: 2018-02-12

## 2018-02-12 VITALS
RESPIRATION RATE: 16 BRPM | HEIGHT: 65 IN | TEMPERATURE: 97.3 F | SYSTOLIC BLOOD PRESSURE: 106 MMHG | OXYGEN SATURATION: 98 % | HEART RATE: 81 BPM | BODY MASS INDEX: 40.79 KG/M2 | WEIGHT: 244.8 LBS | DIASTOLIC BLOOD PRESSURE: 68 MMHG

## 2018-02-12 DIAGNOSIS — F32.A DEPRESSION, UNSPECIFIED DEPRESSION TYPE: ICD-10-CM

## 2018-02-12 DIAGNOSIS — R30.0 DYSURIA: ICD-10-CM

## 2018-02-12 DIAGNOSIS — K21.9 GASTROESOPHAGEAL REFLUX DISEASE, ESOPHAGITIS PRESENCE NOT SPECIFIED: ICD-10-CM

## 2018-02-12 DIAGNOSIS — J45.20 MILD INTERMITTENT ASTHMA, UNSPECIFIED WHETHER COMPLICATED: Primary | ICD-10-CM

## 2018-02-12 DIAGNOSIS — Z32.00 POSSIBLE PREGNANCY, NOT CONFIRMED: ICD-10-CM

## 2018-02-12 LAB
BILIRUBIN, POC: NORMAL
BLOOD URINE, POC: NORMAL
CLARITY, POC: NORMAL
COLOR, POC: NORMAL
CONTROL: NORMAL
GLUCOSE URINE, POC: NORMAL
KETONES, POC: NORMAL
LEUKOCYTE EST, POC: NORMAL
NITRITE, POC: NORMAL
PH, POC: 6
PREGNANCY TEST URINE, POC: NORMAL
PROTEIN, POC: NORMAL
SPECIFIC GRAVITY, POC: 1.02
UROBILINOGEN, POC: NORMAL

## 2018-02-12 PROCEDURE — 1036F TOBACCO NON-USER: CPT | Performed by: NURSE PRACTITIONER

## 2018-02-12 PROCEDURE — 81002 URINALYSIS NONAUTO W/O SCOPE: CPT | Performed by: NURSE PRACTITIONER

## 2018-02-12 PROCEDURE — G8484 FLU IMMUNIZE NO ADMIN: HCPCS | Performed by: NURSE PRACTITIONER

## 2018-02-12 PROCEDURE — G8417 CALC BMI ABV UP PARAM F/U: HCPCS | Performed by: NURSE PRACTITIONER

## 2018-02-12 PROCEDURE — 99214 OFFICE O/P EST MOD 30 MIN: CPT | Performed by: NURSE PRACTITIONER

## 2018-02-12 PROCEDURE — G8427 DOCREV CUR MEDS BY ELIG CLIN: HCPCS | Performed by: NURSE PRACTITIONER

## 2018-02-12 PROCEDURE — 81025 URINE PREGNANCY TEST: CPT | Performed by: NURSE PRACTITIONER

## 2018-02-12 RX ORDER — MONTELUKAST SODIUM 10 MG/1
10 TABLET ORAL NIGHTLY
Qty: 30 TABLET | Refills: 3 | Status: SHIPPED | OUTPATIENT
Start: 2018-02-12 | End: 2019-03-21 | Stop reason: ALTCHOICE

## 2018-02-12 RX ORDER — PANTOPRAZOLE SODIUM 40 MG/1
40 TABLET, DELAYED RELEASE ORAL DAILY
Qty: 30 TABLET | Refills: 1 | Status: SHIPPED | OUTPATIENT
Start: 2018-02-12 | End: 2018-08-08

## 2018-02-12 ASSESSMENT — ENCOUNTER SYMPTOMS
SHORTNESS OF BREATH: 0
WHEEZING: 1
CONSTIPATION: 0
DIARRHEA: 0
CHEST TIGHTNESS: 1
ABDOMINAL PAIN: 0
VOMITING: 0
NAUSEA: 1
EYES NEGATIVE: 1

## 2018-02-12 NOTE — PATIENT INSTRUCTIONS
Patient Education        Controlling Your Asthma: Care Instructions  Your Care Instructions    Asthma is a long-term condition that affects your breathing. It causes the airways that lead to the lungs to swell. During an asthma attack, the airways swell and narrow. This makes it hard to breathe. You may wheeze or cough. If you have a bad attack, you may need emergency care. There are two things to do to treat asthma. · Control asthma over the long term. · Treat attacks when they occur. You and your doctor can make an asthma action plan. It tells you what medicines you need to take every day to control asthma symptoms and what to do if you have an asthma attack. Your asthma action plan can help prevent and treat attacks. When you keep your asthma under control, you can prevent severe attacks and lasting damage to your airways. You need to treat your asthma even when you are not having symptoms. Although asthma is a lifelong disease, treatment can help control it and help you stay healthy. Follow-up care is a key part of your treatment and safety. Be sure to make and go to all appointments, and call your doctor if you are having problems. It's also a good idea to know your test results and keep a list of the medicines you take. How can you care for yourself at home? To control asthma over the long term  Medicines  Controller medicines reduce swelling in your lungs. They also prevent asthma attacks. Take your controller medicine exactly as prescribed. Talk to your doctor if you have any problems with your medicine. · Inhaled corticosteroid is a common and effective controller medicine. Using it the right way can prevent or reduce most side effects. · Take your controller medicine every day, not just when you have symptoms. It helps prevent problems before they occur. · Your doctor may prescribe another medicine that you use along with the corticosteroid. This is often a long-acting bronchodilator.  Do not work, but they may shorten the attack and help you breathe better. · Albuterol is an effective quick-relief inhaler. · Take your quick-relief medicine exactly as prescribed. · Always bring your asthma medicine with you when you travel. · You may need to use quick-relief medicine before you exercise. · Call your doctor if you think you are having a problem with your medicine. When should you call for help? Call 911 anytime you think you may need emergency care. For example, call if:  ? · You are having severe trouble breathing. ?Call your doctor now or seek immediate medical care if:  ? · Your symptoms do not get better after you have followed your asthma action plan. ? · You cough up yellow, dark brown, or bloody mucus (sputum). ? Watch closely for changes in your health, and be sure to contact your doctor if:  ? · Your coughing and wheezing get worse. ? · You need to use your quick-relief medicine on more than 2 days a week (unless it is just for exercise). ? · You need help figuring out what is triggering your asthma attacks. Where can you learn more? Go to https://Go Try It On.TearScience. org and sign in to your FlexWage [employer] account. Enter Z524 in the COZero box to learn more about \"Controlling Your Asthma: Care Instructions. \"     If you do not have an account, please click on the \"Sign Up Now\" link. Current as of: May 12, 2017  Content Version: 11.5  © 0909-7001 Healthwise, Incorporated. Care instructions adapted under license by [facility] ([facility]). If you have questions about a medical condition or this instruction, always ask your healthcare professional. [address] any warranty or liability for your use of this information.

## 2018-02-14 LAB — URINE CULTURE, ROUTINE: NORMAL

## 2018-02-15 ENCOUNTER — HOSPITAL ENCOUNTER (OUTPATIENT)
Dept: CARDIOLOGY | Age: 23
Discharge: HOME | End: 2018-02-15
Attending: NURSE PRACTITIONER | Admitting: NURSE PRACTITIONER

## 2018-02-15 DIAGNOSIS — R07.9 CHEST PAIN: ICD-10-CM

## 2018-02-15 LAB
LV EF: 55 %
LVEF MODALITY: NORMAL

## 2018-02-21 ENCOUNTER — TELEPHONE (OUTPATIENT)
Dept: FAMILY MEDICINE CLINIC | Age: 23
End: 2018-02-21

## 2018-02-21 NOTE — TELEPHONE ENCOUNTER
Pt had urine culture done last week and is calling for the results. Today her urine is bright red, nausea, chills and feels like she is going to pass out. Please call back and advise.

## 2018-02-22 ENCOUNTER — TELEPHONE (OUTPATIENT)
Dept: FAMILY MEDICINE CLINIC | Age: 23
End: 2018-02-22

## 2018-02-22 RX ORDER — ONDANSETRON 4 MG/1
4 TABLET, FILM COATED ORAL DAILY PRN
Qty: 30 TABLET | Refills: 0 | Status: SHIPPED | OUTPATIENT
Start: 2018-02-22 | End: 2018-08-08

## 2018-02-22 NOTE — TELEPHONE ENCOUNTER
Pt was advised yesterday to go to urgent care for her symptoms. She was dx with the flu. Pt is very nauseous today and would like to see if something could be called in. Dontae on file is correct. Please advise.

## 2018-02-28 RX ORDER — PAROXETINE HYDROCHLORIDE 20 MG/1
20 TABLET, FILM COATED ORAL EVERY MORNING
Qty: 30 TABLET | Refills: 0 | Status: SHIPPED | OUTPATIENT
Start: 2018-02-28 | End: 2019-03-21 | Stop reason: ALTCHOICE

## 2018-03-01 ENCOUNTER — OFFICE VISIT (OUTPATIENT)
Dept: FAMILY MEDICINE CLINIC | Age: 23
End: 2018-03-01

## 2018-03-01 VITALS
HEIGHT: 65 IN | SYSTOLIC BLOOD PRESSURE: 116 MMHG | HEART RATE: 115 BPM | OXYGEN SATURATION: 97 % | DIASTOLIC BLOOD PRESSURE: 72 MMHG | TEMPERATURE: 98.2 F | BODY MASS INDEX: 40.15 KG/M2 | RESPIRATION RATE: 16 BRPM | WEIGHT: 241 LBS

## 2018-03-01 DIAGNOSIS — J06.9 VIRAL URI: Primary | ICD-10-CM

## 2018-03-01 DIAGNOSIS — H69.82 EUSTACHIAN TUBE DYSFUNCTION, LEFT: ICD-10-CM

## 2018-03-01 DIAGNOSIS — F41.9 ANXIETY: ICD-10-CM

## 2018-03-01 DIAGNOSIS — F32.A DEPRESSION, UNSPECIFIED DEPRESSION TYPE: ICD-10-CM

## 2018-03-01 PROCEDURE — G8427 DOCREV CUR MEDS BY ELIG CLIN: HCPCS | Performed by: NURSE PRACTITIONER

## 2018-03-01 PROCEDURE — 1036F TOBACCO NON-USER: CPT | Performed by: NURSE PRACTITIONER

## 2018-03-01 PROCEDURE — 99213 OFFICE O/P EST LOW 20 MIN: CPT | Performed by: NURSE PRACTITIONER

## 2018-03-01 PROCEDURE — G8417 CALC BMI ABV UP PARAM F/U: HCPCS | Performed by: NURSE PRACTITIONER

## 2018-03-01 PROCEDURE — G8484 FLU IMMUNIZE NO ADMIN: HCPCS | Performed by: NURSE PRACTITIONER

## 2018-03-01 RX ORDER — DEXTROMETHORPHAN HYDROBROMIDE AND PROMETHAZINE HYDROCHLORIDE 15; 6.25 MG/5ML; MG/5ML
5 SYRUP ORAL 4 TIMES DAILY PRN
Qty: 120 ML | Refills: 0 | Status: SHIPPED | OUTPATIENT
Start: 2018-03-01 | End: 2018-08-08

## 2018-03-01 RX ORDER — METHYLPREDNISOLONE 4 MG/1
TABLET ORAL
Qty: 1 KIT | Refills: 0 | Status: SHIPPED | OUTPATIENT
Start: 2018-03-01 | End: 2018-08-08

## 2018-03-01 ASSESSMENT — ENCOUNTER SYMPTOMS
RESPIRATORY NEGATIVE: 1
DIARRHEA: 0
COLOR CHANGE: 0
VOMITING: 0
RHINORRHEA: 1
EYES NEGATIVE: 1
COUGH: 0
SINUS PAIN: 0
SHORTNESS OF BREATH: 0
SINUS PRESSURE: 0
GASTROINTESTINAL NEGATIVE: 1
CHEST TIGHTNESS: 0
TROUBLE SWALLOWING: 1
SORE THROAT: 1
ABDOMINAL PAIN: 0

## 2018-03-01 NOTE — PATIENT INSTRUCTIONS
[facility]  [facility]  Restart paxil- may take a few weeks for side effects to go away  Reach out to couselor

## 2018-03-13 ENCOUNTER — CLINICAL DOCUMENTATION (OUTPATIENT)
Dept: SPIRITUAL SERVICES | Age: 23
End: 2018-03-13

## 2018-06-05 ENCOUNTER — CLINICAL DOCUMENTATION (OUTPATIENT)
Dept: SPIRITUAL SERVICES | Age: 23
End: 2018-06-05

## 2018-06-15 ENCOUNTER — CLINICAL DOCUMENTATION (OUTPATIENT)
Dept: SPIRITUAL SERVICES | Age: 23
End: 2018-06-15

## 2018-08-08 ENCOUNTER — HOSPITAL ENCOUNTER (EMERGENCY)
Age: 23
Discharge: HOME | End: 2018-08-08
Payer: COMMERCIAL

## 2018-08-08 VITALS
OXYGEN SATURATION: 100 % | WEIGHT: 255 LBS | HEART RATE: 80 BPM | DIASTOLIC BLOOD PRESSURE: 83 MMHG | TEMPERATURE: 98.8 F | RESPIRATION RATE: 16 BRPM | HEIGHT: 65 IN | BODY MASS INDEX: 42.49 KG/M2 | SYSTOLIC BLOOD PRESSURE: 133 MMHG

## 2018-08-08 DIAGNOSIS — N93.9 VAGINAL BLEEDING: Primary | ICD-10-CM

## 2018-08-08 DIAGNOSIS — R11.0 NAUSEA: ICD-10-CM

## 2018-08-08 DIAGNOSIS — R53.83 MALAISE AND FATIGUE: ICD-10-CM

## 2018-08-08 DIAGNOSIS — R53.81 MALAISE AND FATIGUE: ICD-10-CM

## 2018-08-08 LAB
A/G RATIO: 1.6 (ref 1.1–2.2)
ALBUMIN SERPL-MCNC: 4.6 G/DL (ref 3.4–5)
ALP BLD-CCNC: 106 U/L (ref 40–129)
ALT SERPL-CCNC: 37 U/L (ref 10–40)
ANION GAP SERPL CALCULATED.3IONS-SCNC: 13 MMOL/L (ref 3–16)
AST SERPL-CCNC: 26 U/L (ref 15–37)
BASOPHILS ABSOLUTE: 0.1 K/UL (ref 0–0.2)
BASOPHILS RELATIVE PERCENT: 1.1 %
BILIRUB SERPL-MCNC: 0.3 MG/DL (ref 0–1)
BILIRUBIN URINE: NEGATIVE
BLOOD, URINE: ABNORMAL
BUN BLDV-MCNC: 10 MG/DL (ref 7–20)
CALCIUM SERPL-MCNC: 9.6 MG/DL (ref 8.3–10.6)
CHLORIDE BLD-SCNC: 101 MMOL/L (ref 99–110)
CLARITY: CLEAR
CO2: 23 MMOL/L (ref 21–32)
COLOR: YELLOW
COMMENT UA: ABNORMAL
CREAT SERPL-MCNC: 0.6 MG/DL (ref 0.6–1.1)
EOSINOPHILS ABSOLUTE: 0.2 K/UL (ref 0–0.6)
EOSINOPHILS RELATIVE PERCENT: 2.6 %
GFR AFRICAN AMERICAN: >60
GFR NON-AFRICAN AMERICAN: >60
GLOBULIN: 2.9 G/DL
GLUCOSE BLD-MCNC: 91 MG/DL (ref 70–99)
GLUCOSE URINE: NEGATIVE MG/DL
GONADOTROPIN, CHORIONIC (HCG) QUANT: <5 MIU/ML
HCT VFR BLD CALC: 43.3 % (ref 36–48)
HEMOGLOBIN: 15.1 G/DL (ref 12–16)
KETONES, URINE: NEGATIVE MG/DL
LEUKOCYTE ESTERASE, URINE: NEGATIVE
LYMPHOCYTES ABSOLUTE: 2 K/UL (ref 1–5.1)
LYMPHOCYTES RELATIVE PERCENT: 25.5 %
MCH RBC QN AUTO: 30.1 PG (ref 26–34)
MCHC RBC AUTO-ENTMCNC: 34.8 G/DL (ref 31–36)
MCV RBC AUTO: 86.4 FL (ref 80–100)
MICROSCOPIC EXAMINATION: YES
MONOCYTES ABSOLUTE: 0.5 K/UL (ref 0–1.3)
MONOCYTES RELATIVE PERCENT: 6.2 %
NEUTROPHILS ABSOLUTE: 5 K/UL (ref 1.7–7.7)
NEUTROPHILS RELATIVE PERCENT: 64.6 %
NITRITE, URINE: NEGATIVE
PDW BLD-RTO: 13.3 % (ref 12.4–15.4)
PH UA: 5.5
PLATELET # BLD: 179 K/UL (ref 135–450)
PMV BLD AUTO: 9.4 FL (ref 5–10.5)
POTASSIUM SERPL-SCNC: 4 MMOL/L (ref 3.5–5.1)
PROTEIN UA: NEGATIVE MG/DL
RBC # BLD: 5.01 M/UL (ref 4–5.2)
RBC UA: >100 /HPF (ref 0–2)
SODIUM BLD-SCNC: 137 MMOL/L (ref 136–145)
SPECIFIC GRAVITY UA: >=1.03
SPECIMEN STATUS: NORMAL
TOTAL PROTEIN: 7.5 G/DL (ref 6.4–8.2)
URINE TYPE: ABNORMAL
UROBILINOGEN, URINE: 0.2 E.U./DL
WBC # BLD: 7.7 K/UL (ref 4–11)
WBC UA: ABNORMAL /HPF (ref 0–5)

## 2018-08-08 PROCEDURE — 84702 CHORIONIC GONADOTROPIN TEST: CPT

## 2018-08-08 PROCEDURE — 81001 URINALYSIS AUTO W/SCOPE: CPT

## 2018-08-08 PROCEDURE — 96360 HYDRATION IV INFUSION INIT: CPT

## 2018-08-08 PROCEDURE — 80053 COMPREHEN METABOLIC PANEL: CPT

## 2018-08-08 PROCEDURE — 99283 EMERGENCY DEPT VISIT LOW MDM: CPT

## 2018-08-08 PROCEDURE — 85025 COMPLETE CBC W/AUTO DIFF WBC: CPT

## 2018-08-08 PROCEDURE — 2580000003 HC RX 258: Performed by: PHYSICIAN ASSISTANT

## 2018-08-08 RX ORDER — 0.9 % SODIUM CHLORIDE 0.9 %
1000 INTRAVENOUS SOLUTION INTRAVENOUS ONCE
Status: COMPLETED | OUTPATIENT
Start: 2018-08-08 | End: 2018-08-08

## 2018-08-08 RX ORDER — ONDANSETRON 4 MG/1
4 TABLET, ORALLY DISINTEGRATING ORAL EVERY 8 HOURS PRN
Qty: 15 TABLET | Refills: 0 | Status: SHIPPED | OUTPATIENT
Start: 2018-08-08 | End: 2019-03-21 | Stop reason: ALTCHOICE

## 2018-08-08 RX ADMIN — SODIUM CHLORIDE 1000 ML: 9 INJECTION, SOLUTION INTRAVENOUS at 13:45

## 2018-08-08 ASSESSMENT — ENCOUNTER SYMPTOMS
VOMITING: 0
ABDOMINAL PAIN: 0
EYES NEGATIVE: 1
COUGH: 0
SHORTNESS OF BREATH: 0
COLOR CHANGE: 0
NAUSEA: 1

## 2018-08-08 ASSESSMENT — PAIN DESCRIPTION - DESCRIPTORS: DESCRIPTORS: CRAMPING

## 2018-08-08 ASSESSMENT — PAIN SCALES - GENERAL: PAINLEVEL_OUTOF10: 6

## 2018-08-08 ASSESSMENT — PAIN DESCRIPTION - LOCATION: LOCATION: ABDOMEN

## 2018-08-08 NOTE — ED PROVIDER NOTES
[facility]  ED  eMERGENCY dEPARTMENT eNCOUnter        Pt Name: Tee Ling  MRN: [id number]  [place] 1995  Date of evaluation: 8/8/2018  Provider: Terell Evans PA-C  PCP: Ammon Libman, APRN - CNP  ED Attending: Elias Lopez    History provided by the patient    CHIEF COMPLAINT:     Chief Complaint   Patient presents with    Fever     since sunday she states she has ran random fevers and the highest was 101. monday morning she was spotting and then yesterday it got red. this morning she took a test and it was positive 0600, at 1000 she had a gush and been bright red ever since    Vaginal Bleeding       HISTORY OF PRESENT ILLNESS:      Tee Ling is a 26 y.o. female who arrives to the ED by private vehicle. She states she has had low-grade fevers off and on since this past Sunday, 8/5. Monday, 8/6 she has had vaginal spotting that today turned into vaginal bleeding that is comparable to a normal menstrual cycle. She has been trying to get pregnant and has been going through fertility treatments. She took a home pregnancy test today and thinks it might have been positive. With her bleeding though, she is concerned about miscarriage. Additionally, she describes fatigue and nausea. There are no identifiable exacerbating or alleviating factors to her symptoms. Nursing Notes were reviewed     REVIEW OF SYSTEMS:     Review of Systems   Constitutional: Positive for fatigue. Negative for activity change, appetite change, chills and fever. HENT: Negative. Eyes: Negative. Respiratory: Negative for cough and shortness of breath. Cardiovascular: Negative for chest pain. Gastrointestinal: Positive for nausea. Negative for abdominal pain and vomiting. Genitourinary: Positive for vaginal bleeding. Musculoskeletal: Negative for gait problem, joint swelling and neck pain. Skin: Negative for color change. Neurological: Negative for weakness, light-headedness and headaches. All other systems reviewed and are negative. Except as noted above in the ROS, all other systems were reviewed and negative.          PAST MEDICAL HISTORY:     Past Medical History:   Diagnosis Date    Anxiety     Asthma     Back pain     Depression     Headache     Ovarian torsion     PCOS (polycystic ovarian syndrome)          SURGICAL HISTORY:      Past Surgical History:   Procedure Laterality Date    CYST REMOVAL      TONSILLECTOMY AND ADENOIDECTOMY           CURRENT MEDICATIONS:       Previous Medications    ALBUTEROL SULFATE HFA (VENTOLIN HFA) 108 (90 BASE) MCG/ACT INHALER    Inhale 2 puffs into the lungs every 6 hours as needed for Wheezing    MONTELUKAST (SINGULAIR) 10 MG TABLET    Take 1 tablet by mouth nightly    PAROXETINE (PAXIL) 20 MG TABLET    TAKE 1 TABLET BY MOUTH EVERY MORNING         ALLERGIES:    No known allergies    FAMILY HISTORY:       Family History   Problem Relation Age of Onset    Substance Abuse Mother     Asthma Mother     Substance Abuse Father     Heart Attack Maternal Grandmother     Heart Disease Maternal Grandmother     High Blood Pressure Maternal Grandmother     No Known Problems Maternal Grandfather     High Blood Pressure Paternal Grandmother     Heart Disease Paternal Grandmother     Stroke Paternal Grandmother     Atrial Fibrillation Paternal Grandmother     Heart Attack Paternal Grandmother     No Known Problems Paternal Grandfather     Breast Cancer Neg Hx     Colon Cancer Neg Hx     Diabetes Neg Hx           SOCIAL HISTORY:       Social History     Social History    Marital status: Single     Spouse name: N/A    Number of children: N/A    Years of education: N/A     Social History Main Topics    Smoking status: Never Smoker    Smokeless tobacco: Never Used    Alcohol use Yes      Comment: occasional    Drug use: No    Sexual activity: Yes     Partners: Male      Comment: condoms     Other Topics Concern    None     Social History Narrative    None       SCREENINGS:             PHYSICAL EXAM:       ED Triage Vitals   BP Temp Temp Source Pulse Resp SpO2 Height Weight   08/08/18 1344 08/08/18 1344 08/08/18 1344 08/08/18 1344 08/08/18 1344 08/08/18 1344 08/08/18 1227 08/08/18 1227   94/79 98.8 °F (37.1 °C) Oral 88 16 98 % 5' 5\" (1.651 m) 255 lb (115.7 kg)       Physical Exam    CONSTITUTIONAL: Awake and alert. Cooperative. Well-developed. Well-nourished. Non-toxic. No acute distress. HENT: Normocephalic. Atraumatic. External ears normal, without discharge. No nasal discharge. Oropharynx clear. Mucous membranes moist.  EYES: Conjunctiva non-injected. No scleral icterus. PERRL. EOM's grossly intact. NECK: Supple. Normal ROM. CARDIOVASCULAR: RRR. No Murmer. Intact distal pulses. PULMONARY/CHEST WALL: Effort normal. No tachypnea. Lungs clear to ausculation. ABDOMEN: Normal BS. Soft. Nondistended. No tenderness to palpate. No guarding. /ANORECTAL: Not assessed  MUSKULOSKELETAL: Normal ROM. No acute deformities. No edema. No tenderness to palpate. SKIN: Warm and dry. NEUROLOGICAL: Alert and oriented x 3. GCS 15. CN II-XII grossly intact. Strength is 5/5 in all extremities and sensation is intact. Normal gait.    PSYCHIATRIC: Normal affect        DIAGNOSTIC RESULTS:     LABS:    Results for orders placed or performed during the hospital encounter of 08/08/18   Comprehensive Metabolic Panel   Result Value Ref Range    Sodium 137 136 - 145 mmol/L    Potassium 4.0 3.5 - 5.1 mmol/L    Chloride 101 99 - 110 mmol/L    CO2 23 21 - 32 mmol/L    Anion Gap 13 3 - 16    Glucose 91 70 - 99 mg/dL    BUN 10 7 - 20 mg/dL    CREATININE 0.6 0.6 - 1.1 mg/dL    GFR Non-African American >60 >60    GFR African American >60 >60    Calcium 9.6 8.3 - 10.6 mg/dL    Total Protein 7.5 6.4 - 8.2 g/dL    Alb 4.6 3.4 - 5.0 g/dL    Albumin/Globulin Ratio 1.6 1.1 - 2.2    Total Bilirubin 0.3 0.0 - 1.0 mg/dL    Alkaline Phosphatase 106 40 - 129 U/L    ALT 37 10 - 40 U/L    AST 26 15 - 37 U/L    Globulin 2.9 g/dL   CBC Auto Differential   Result Value Ref Range    WBC 7.7 4.0 - 11.0 K/uL    RBC 5.01 4.00 - 5.20 M/uL    Hemoglobin 15.1 12.0 - 16.0 g/dL    Hematocrit 43.3 36.0 - 48.0 %    MCV 86.4 80.0 - 100.0 fL    MCH 30.1 26.0 - 34.0 pg    MCHC 34.8 31.0 - 36.0 g/dL    RDW 13.3 12.4 - 15.4 %    Platelets 999 366 - 390 K/uL    MPV 9.4 5.0 - 10.5 fL    Neutrophils % 64.6 %    Lymphocytes % 25.5 %    Monocytes % 6.2 %    Eosinophils % 2.6 %    Basophils % 1.1 %    Neutrophils # 5.0 1.7 - 7.7 K/uL    Lymphocytes # 2.0 1.0 - 5.1 K/uL    Monocytes # 0.5 0.0 - 1.3 K/uL    Eosinophils # 0.2 0.0 - 0.6 K/uL    Basophils # 0.1 0.0 - 0.2 K/uL   Sample possible blood bank testing   Result Value Ref Range    Specimen Status JAHAIRA    Urinalysis   Result Value Ref Range    Color, UA Yellow Straw/Yellow    Clarity, UA Clear Clear    Glucose, Ur Negative Negative mg/dL    Bilirubin Urine Negative Negative    Ketones, Urine Negative Negative mg/dL    Specific Gravity, UA >=1.030 1.005 - 1.030    Blood, Urine LARGE (A) Negative    pH, UA 5.5 5.0 - 8.0    Protein, UA Negative Negative mg/dL    Urobilinogen, Urine 0.2 <2.0 E.U./dL    Nitrite, Urine Negative Negative    Leukocyte Esterase, Urine Negative Negative    Microscopic Examination YES     Urine Type Not Specified    HCG, Quantitative, Pregnancy   Result Value Ref Range    hCG Quant <5.0 <5.0 mIU/mL   Microscopic Urinalysis   Result Value Ref Range    WBC, UA see below 0 - 5 /HPF    RBC, UA >100 (A) 0 - 2 /HPF    Urinalysis Comments see below          RADIOLOGY:    NONE      PROCEDURES:   N/A    CRITICAL CARE TIME:   N/A    CONSULTS:  None      EMERGENCY DEPARTMENT COURSE and DIFFERENTIAL DIAGNOSIS/MDM:   Vitals:    Vitals:    08/08/18 1227 08/08/18 1344 08/08/18 1459   BP:  94/79 133/83   Pulse:  88 80   Resp:  16    Temp:  98.8 °F (37.1 °C)    TempSrc:  Oral    SpO2:  98% 100%   Weight: 255 lb (115.7 kg)     Height: 5' 5\" (1.651 m) Rowena Edwards 122    Schedule an appointment as soon as possible for a visit         DISCHARGE MEDICATIONS:  New Prescriptions    ONDANSETRON (ZOFRAN ODT) 4 MG DISINTEGRATING TABLET    Take 1 tablet by mouth every 8 hours as needed for Nausea                  (Please note that portions of this note were completed with a voice recognition program.  Efforts were made to edit the dictations, but occasionally words are mis-transcribed.)    Terell Evans PA-C (electronically signed)              Salvador Negrete [facility]  08/08/18 [id number]

## 2018-08-08 NOTE — ED NOTES
- pt given with discharge instructions, rx, and materials. - instructions, rx, and follow up appointments reviewed with patient/family. No further questions posed. - vs and pt stable upon discharge. - pt ambulatory to [facility].        Rafael Schroeder RN  08/08/18 [id number]

## 2018-08-09 ENCOUNTER — CLINICAL DOCUMENTATION (OUTPATIENT)
Dept: SPIRITUAL SERVICES | Age: 23
End: 2018-08-09

## 2018-08-09 NOTE — PROGRESS NOTES
8/9/2018  3:00pm/1500    [facility]patient [facility] mailed closing letter to patient. Contact information for Outpatient [facility] provided if patient desires spiritual care in the future.

## 2018-10-25 NOTE — PROGRESS NOTES
10/13/2017    This is a 23 y.o. female   Chief Complaint   Patient presents with    Abdominal Pain     still the same    . Guy De La Cruz is here for follow-up on abdominal pain. She had a negative workup for pancreatitis as well as gallstones. She was instructed at her last visit to bring a food diary as spicy and fried foods for her triggers to her upper abdominal pain. Current complaints include unchanged upper right quadrant abdominal pain. This occurs just intermitently and is not related to food every time. She does not notice any particular food triggers. Patient Active Problem List   Diagnosis    High-risk pregnancy    Bilateral polycystic ovarian syndrome    Need for rhogam due to Rh negative mother    Chlamydia    Migraine headache    Screening for malignant neoplasm of cervix    Unspecified vitamin D deficiency       Current Outpatient Prescriptions   Medication Sig Dispense Refill    pantoprazole (PROTONIX) 40 MG tablet Take 1 tablet by mouth daily 30 tablet 1    albuterol sulfate HFA (VENTOLIN HFA) 108 (90 Base) MCG/ACT inhaler Inhale 2 puffs into the lungs every 6 hours as needed for Wheezing 1 Inhaler 3    diclofenac (VOLTAREN) 75 MG EC tablet Take 1 tablet by mouth 2 times daily 30 tablet 1     No current facility-administered medications for this visit. Allergies   Allergen Reactions    No Known Allergies        /74 (Site: Left Arm, Position: Sitting, Cuff Size: Medium Adult)   Pulse 80   Temp 97.8 °F (36.6 °C) (Oral)   Resp 16   Ht 5' 5\" (1.651 m)   Wt 227 lb 11.2 oz (103.3 kg)   SpO2 98%   Breastfeeding? No   BMI 37.89 kg/m²     Social History   Substance Use Topics    Smoking status: Never Smoker    Smokeless tobacco: Never Used    Alcohol use No       Review of Systems   Constitutional: Negative. HENT: Negative. Eyes: Negative. Respiratory: Negative. Negative for cough, chest tightness and shortness of breath. Cardiovascular: Negative.   Negative for palpitations and leg swelling. Gastrointestinal: Positive for abdominal pain. Negative for diarrhea, nausea and vomiting. Endocrine: Negative. Genitourinary: Negative. Negative for dyspareunia, dysuria and frequency. Musculoskeletal: Negative. Skin: Negative. Allergic/Immunologic: Negative. Neurological: Negative. Hematological: Negative. Psychiatric/Behavioral: Negative. Physical Exam   Constitutional: She is oriented to person, place, and time. She appears well-developed and well-nourished. No distress. HENT:   Head: Normocephalic and atraumatic. Right Ear: External ear normal.   Left Ear: External ear normal.   Nose: Nose normal.   Mouth/Throat: Oropharynx is clear and moist. No oropharyngeal exudate. Eyes: Conjunctivae and EOM are normal. Right eye exhibits no discharge. Left eye exhibits no discharge. Neck: Normal range of motion. Neck supple. Cardiovascular: Normal rate, regular rhythm and normal heart sounds. Exam reveals no gallop and no friction rub. No murmur heard. Pulmonary/Chest: Effort normal and breath sounds normal. No respiratory distress. She has no wheezes. She has no rales. Abdominal: Soft. Bowel sounds are normal. She exhibits no distension. There is tenderness (mild epigastric). Musculoskeletal: Normal range of motion. She exhibits no edema or tenderness. Lymphadenopathy:     She has no cervical adenopathy. Neurological: She is alert and oriented to person, place, and time. She exhibits normal muscle tone. Coordination normal.   Skin: Skin is warm and dry. No rash noted. She is not diaphoretic. No erythema. No pallor. Psychiatric: She has a normal mood and affect. Her behavior is normal. Judgment and thought content normal.   Nursing note and vitals reviewed. Diagnosis    ICD-10-CM ICD-9-CM    1.  Upper abdominal pain R10.10 789.09         Plan    Continue to keep food diary  Start protonix for possible GERD/ PUD  If symptoms worsen or fail to improve call office, will place referral to GI    No orders of the defined types were placed in this encounter. Orders Placed This Encounter   Medications    pantoprazole (PROTONIX) 40 MG tablet     Sig: Take 1 tablet by mouth daily     Dispense:  30 tablet     Refill:  1         Return in about 4 weeks (around 11/10/2017) for GERD/abdominal pain . Procedure To Be Performed At Next Visit: Excision Detail Level: Simple

## 2019-03-21 ENCOUNTER — HOSPITAL ENCOUNTER (EMERGENCY)
Age: 24
Discharge: HOME | End: 2019-03-22
Attending: EMERGENCY MEDICINE
Payer: COMMERCIAL

## 2019-03-21 ENCOUNTER — APPOINTMENT (OUTPATIENT)
Dept: GENERAL RADIOLOGY | Age: 24
End: 2019-03-21
Payer: COMMERCIAL

## 2019-03-21 VITALS
WEIGHT: 255 LBS | BODY MASS INDEX: 42.43 KG/M2 | DIASTOLIC BLOOD PRESSURE: 72 MMHG | OXYGEN SATURATION: 98 % | SYSTOLIC BLOOD PRESSURE: 122 MMHG | TEMPERATURE: 98.2 F | RESPIRATION RATE: 16 BRPM | HEART RATE: 96 BPM

## 2019-03-21 DIAGNOSIS — R06.02 SHORTNESS OF BREATH: ICD-10-CM

## 2019-03-21 DIAGNOSIS — R00.2 PALPITATIONS: Primary | ICD-10-CM

## 2019-03-21 LAB
A/G RATIO: 1.9 (ref 1.1–2.2)
ALBUMIN SERPL-MCNC: 4.4 G/DL (ref 3.4–5)
ALP BLD-CCNC: 100 U/L (ref 40–129)
ALT SERPL-CCNC: 47 U/L (ref 10–40)
ANION GAP SERPL CALCULATED.3IONS-SCNC: 12 MMOL/L (ref 3–16)
AST SERPL-CCNC: 31 U/L (ref 15–37)
BILIRUB SERPL-MCNC: <0.2 MG/DL (ref 0–1)
BILIRUBIN URINE: NEGATIVE
BLOOD, URINE: NEGATIVE
BUN BLDV-MCNC: 9 MG/DL (ref 7–20)
CALCIUM SERPL-MCNC: 9.4 MG/DL (ref 8.3–10.6)
CHLORIDE BLD-SCNC: 102 MMOL/L (ref 99–110)
CLARITY: CLEAR
CO2: 25 MMOL/L (ref 21–32)
COLOR: YELLOW
CREAT SERPL-MCNC: 0.6 MG/DL (ref 0.6–1.1)
D DIMER: <200 NG/ML DDU (ref 0–229)
GFR AFRICAN AMERICAN: >60
GFR NON-AFRICAN AMERICAN: >60
GLOBULIN: 2.3 G/DL
GLUCOSE BLD-MCNC: 105 MG/DL (ref 70–99)
GLUCOSE URINE: NEGATIVE MG/DL
HCG QUALITATIVE: NEGATIVE
HCT VFR BLD CALC: 40.4 % (ref 36–48)
HEMOGLOBIN: 14 G/DL (ref 12–16)
KETONES, URINE: NEGATIVE MG/DL
LEUKOCYTE ESTERASE, URINE: NEGATIVE
MCH RBC QN AUTO: 31.7 PG (ref 26–34)
MCHC RBC AUTO-ENTMCNC: 34.8 G/DL (ref 31–36)
MCV RBC AUTO: 91 FL (ref 80–100)
MICROSCOPIC EXAMINATION: NORMAL
NITRITE, URINE: NEGATIVE
PDW BLD-RTO: 13.2 % (ref 12.4–15.4)
PH UA: 6 (ref 5–8)
PLATELET # BLD: 173 K/UL (ref 135–450)
PMV BLD AUTO: 9.3 FL (ref 5–10.5)
POTASSIUM SERPL-SCNC: 3.9 MMOL/L (ref 3.5–5.1)
PROTEIN UA: NEGATIVE MG/DL
RBC # BLD: 4.44 M/UL (ref 4–5.2)
SODIUM BLD-SCNC: 139 MMOL/L (ref 136–145)
SPECIFIC GRAVITY UA: >=1.03 (ref 1–1.03)
TOTAL PROTEIN: 6.7 G/DL (ref 6.4–8.2)
TROPONIN: <0.01 NG/ML
URINE TYPE: NORMAL
UROBILINOGEN, URINE: 0.2 E.U./DL
WBC # BLD: 9 K/UL (ref 4–11)

## 2019-03-21 PROCEDURE — 81003 URINALYSIS AUTO W/O SCOPE: CPT

## 2019-03-21 PROCEDURE — 2580000003 HC RX 258: Performed by: NURSE PRACTITIONER

## 2019-03-21 PROCEDURE — 93005 ELECTROCARDIOGRAM TRACING: CPT | Performed by: EMERGENCY MEDICINE

## 2019-03-21 PROCEDURE — 71046 X-RAY EXAM CHEST 2 VIEWS: CPT

## 2019-03-21 PROCEDURE — 84703 CHORIONIC GONADOTROPIN ASSAY: CPT

## 2019-03-21 PROCEDURE — 6370000000 HC RX 637 (ALT 250 FOR IP): Performed by: NURSE PRACTITIONER

## 2019-03-21 PROCEDURE — 85027 COMPLETE CBC AUTOMATED: CPT

## 2019-03-21 PROCEDURE — 85379 FIBRIN DEGRADATION QUANT: CPT

## 2019-03-21 PROCEDURE — 99285 EMERGENCY DEPT VISIT HI MDM: CPT

## 2019-03-21 PROCEDURE — 80053 COMPREHEN METABOLIC PANEL: CPT

## 2019-03-21 PROCEDURE — 84484 ASSAY OF TROPONIN QUANT: CPT

## 2019-03-21 RX ORDER — ACETAMINOPHEN 325 MG/1
650 TABLET ORAL ONCE
Status: COMPLETED | OUTPATIENT
Start: 2019-03-21 | End: 2019-03-21

## 2019-03-21 RX ORDER — 0.9 % SODIUM CHLORIDE 0.9 %
1000 INTRAVENOUS SOLUTION INTRAVENOUS ONCE
Status: COMPLETED | OUTPATIENT
Start: 2019-03-21 | End: 2019-03-22

## 2019-03-21 RX ADMIN — ACETAMINOPHEN 650 MG: 325 TABLET ORAL at 22:12

## 2019-03-21 RX ADMIN — SODIUM CHLORIDE 1000 ML: 9 INJECTION, SOLUTION INTRAVENOUS at 22:12

## 2019-03-21 ASSESSMENT — PAIN SCALES - GENERAL: PAINLEVEL_OUTOF10: 6

## 2019-03-22 LAB
EKG ATRIAL RATE: 98 BPM
EKG DIAGNOSIS: NORMAL
EKG P AXIS: 33 DEGREES
EKG P-R INTERVAL: 158 MS
EKG Q-T INTERVAL: 366 MS
EKG QRS DURATION: 82 MS
EKG QTC CALCULATION (BAZETT): 467 MS
EKG R AXIS: 34 DEGREES
EKG T AXIS: 43 DEGREES
EKG VENTRICULAR RATE: 98 BPM

## 2019-03-22 PROCEDURE — 93010 ELECTROCARDIOGRAM REPORT: CPT | Performed by: INTERNAL MEDICINE

## 2020-01-10 ENCOUNTER — HOSPITAL ENCOUNTER (OUTPATIENT)
Dept: ULTRASOUND IMAGING | Age: 25
Discharge: HOME | End: 2020-01-10
Payer: COMMERCIAL

## 2020-01-10 PROCEDURE — 76705 ECHO EXAM OF ABDOMEN: CPT

## 2020-07-23 ENCOUNTER — HOSPITAL ENCOUNTER (OUTPATIENT)
Dept: ULTRASOUND IMAGING | Age: 25
Discharge: HOME | End: 2020-07-23
Payer: COMMERCIAL

## 2020-07-23 PROCEDURE — 76700 US EXAM ABDOM COMPLETE: CPT

## 2020-08-10 ENCOUNTER — INITIAL CONSULT (OUTPATIENT)
Dept: GASTROENTEROLOGY | Age: 25
End: 2020-08-10
Payer: COMMERCIAL

## 2020-08-10 VITALS
SYSTOLIC BLOOD PRESSURE: 110 MMHG | DIASTOLIC BLOOD PRESSURE: 70 MMHG | WEIGHT: 266 LBS | BODY MASS INDEX: 44.32 KG/M2 | HEIGHT: 65 IN

## 2020-08-10 PROCEDURE — 99203 OFFICE O/P NEW LOW 30 MIN: CPT | Performed by: INTERNAL MEDICINE

## 2020-08-10 NOTE — PATIENT INSTRUCTIONS
Patient Education        Nonalcoholic Steatohepatitis (CASTELLANOS): Care Instructions  Your Care Instructions     Nonalcoholic steatohepatitis (CASTELLANOS) is liver inflammation. It is caused by a buildup of fat in the liver. The fat buildup is not caused by drinking alcohol. Because of the inflammation, the liver does not work as well as it should. CASTELALNOS is part of a group of liver diseases called nonalcoholic fatty liver disease. In these diseases, fat builds up in the liver and sometimes causes liver damage. This damage can get worse over time. Follow-up care is a key part of your treatment and safety. Be sure to make and go to all appointments, and call your doctor if you are having problems. It's also a good idea to know your test results and keep a list of the medicines you take. How can you care for yourself at home? · Stay at a healthy weight. Or if you need to, slowly get to a healthy weight. · Control your cholesterol. Talk to your doctor about ways to lower your cholesterol, if needed. You might try getting active, taking medicines, and making healthy changes to your diet. · Eat healthy foods. This includes fruits, vegetables, lean meats and dairy, and whole grains. · If you have diabetes, keep your blood sugar at your target level. · Get at least 30 minutes of exercise on most days of the week. Walking is a good choice. You also may want to do other activities, such as running, swimming, cycling, or playing tennis or team sports. · Limit alcohol, or do not drink. Alcohol can damage the liver and cause health problems. When should you call for help? [id number] anytime you think you may need emergency care. For example, call if:  · You have trouble breathing. · You vomit blood or what looks like coffee grounds. Call your doctor now or seek immediate medical care if:  · You feel very sleepy or confused. · You have new or worse belly pain. · You have a fever.   · There is a new or increasing yellow tint to

## 2020-08-10 NOTE — PROGRESS NOTES
[address] [facility],  [address] [place] [address] Ave  [place], [address] [facility]  Phone: Diiwopxs 2     Chief Complaint   Patient presents with    New Patient     RUQ pain, pt also complains of back pain as well       HPI     Thank you ESTELLA Alegria for asking me to see [facility] in consultation. She is a Single [1] White [1] 23 y.o. Zeke Kayw female with medical history of morbid obesity and bilateral PCOS,  seen independently with complaints of right upper quadrant abdominal pain of 6 months duration. Pain is sharp and spastic, radiates to the epigastrium and back. Associated with nausea, and episodic green colored loose stools of 2 days duration. Patient denies aggravation with food intake or alleviating factors. Denies vomiting, fever, chills, unintentional weight loss, hematochezia or melenic stools. Patient reports a 15lb intentional weight loss over the past 7 months and follows with a weight loss clinic at [facility]. A review of prior workup in 03/2019 noted normal liver chemistries with the exception of mild elevation of ALT 47. US abdomen on 01/10/2020 and 07/23/2020 noted hepatomegaly with fatty liver and mild splenomegaly; no evidence of cholelithiasis or choledocholithiasis and normal biliary tree. Pertinent PMH, FH, SH is reviewed below. Last EGD: None   Last Colonoscopy: None     Review of available records reveals:   Wt Readings from Last 50 Encounters:   08/10/20 266 lb (120.7 kg)   03/21/19 255 lb (115.7 kg)   08/08/18 255 lb (115.7 kg)   03/01/18 241 lb (109.3 kg)   02/12/18 244 lb 12.8 oz (111 kg)   01/25/18 241 lb 6.4 oz (109.5 kg)   01/03/18 242 lb 6.4 oz (110 kg)   11/22/17 224 lb (101.6 kg)   11/10/17 224 lb (101.6 kg)   10/24/17 224 lb (101.6 kg)   10/13/17 227 lb 11.2 oz (103.3 kg)   09/22/17 224 lb 9.6 oz (101.9 kg)   09/15/17 220 lb (99.8 kg)   09/01/17 220 lb 9.6 oz (100.1 kg)   08/18/17 220 lb (99.8 kg)   12/12/11 (!) 208 lb (94.3 kg) (>99 %, Z= 2.33)*     * Growth percentiles are based on CDC (Girls, 2-20 Years) data.        No components found for: HGBA1C  BP Readings from Last 3 Encounters:   08/10/20 110/70   03/21/19 122/72   08/08/18 133/83     Health Maintenance   Topic Date Due    Varicella vaccine (1 of 2 - 2-dose childhood series) 04/07/1998    Chlamydia screen  04/07/2013    Cervical cancer screen  04/07/2018    HIV screen  03/01/2028 (Originally 4/7/2012)    Flu vaccine (1) 09/01/2020    DTaP/Tdap/Td vaccine (3 - Td) 11/08/2026    HPV vaccine  Completed    Hepatitis A vaccine  Aged Out    Hepatitis B vaccine  Aged Out    Hib vaccine  Aged Out    Meningococcal (ACWY) vaccine  Aged Out    Pneumococcal 0-64 years Vaccine  Aged Out       No components found for: [employer]     PAST MEDICAL HISTORY     Past Medical History:   Diagnosis Date    Anxiety     Asthma     Back pain     Depression     Headache     Ovarian torsion     PCOS (polycystic ovarian syndrome)      FAMILY HISTORY     Family History   Problem Relation Age of Onset    Substance Abuse Mother     Asthma Mother     Substance Abuse Father     Heart Attack Maternal Grandmother     Heart Disease Maternal Grandmother     High Blood Pressure Maternal Grandmother     No Known Problems Maternal Grandfather     High Blood Pressure Paternal Grandmother     Heart Disease Paternal Grandmother     Stroke Paternal Grandmother     Atrial Fibrillation Paternal Grandmother     Heart Attack Paternal Grandmother     No Known Problems Paternal Grandfather     Breast Cancer Neg Hx     Colon Cancer Neg Hx     Diabetes Neg Hx      SOCIAL HISTORY     Social History     Socioeconomic History    Marital status: Single     Spouse name: Not on file    Number of children: Not on file    Years of education: Not on file    Highest education level: Not on file   Occupational History    Not on file   Social Needs    Financial resource strain: Not on file  Food insecurity     Worry: Not on file     Inability: Not on file    Transportation needs     Medical: Not on file     Non-medical: Not on file   Tobacco Use    Smoking status: Never Smoker    Smokeless tobacco: Never Used   Substance and Sexual Activity    Alcohol use: Yes     Comment: occasional    Drug use: No    Sexual activity: Yes     Partners: Male     Comment: condoms   Lifestyle    Physical activity     Days per week: Not on file     Minutes per session: Not on file    Stress: Not on file   Relationships    Social connections     Talks on phone: Not on file     Gets together: Not on file     Attends Restorationist service: Not on file     Active member of club or organization: Not on file     Attends meetings of clubs or organizations: Not on file     Relationship status: Not on file    Intimate partner violence     Fear of current or ex partner: Not on file     Emotionally abused: Not on file     Physically abused: Not on file     Forced sexual activity: Not on file   Other Topics Concern    Not on file   Social History Narrative    Not on file     SURGICAL HISTORY     Past Surgical History:   Procedure Laterality Date    CYST REMOVAL      TONSILLECTOMY  [address]   (This list may include medications prescribed during this encounter as epic can not insert only the list prior to this encounter.)  Current Outpatient Rx   Medication Sig Dispense Refill    albuterol sulfate HFA (VENTOLIN HFA) 108 (90 Base) MCG/ACT inhaler Inhale 2 puffs into the lungs every 6 hours as needed for Wheezing 1 Inhaler 3     ALLERGIES     Allergies   Allergen Reactions    No Known Allergies      IMMUNIZATIONS     Immunization History   Administered Date(s) Administered    HPV Quadrivalent (Gardasil) 11/20/2009, 01/22/2010, 06/28/2010    Influenza Vaccine, unspecified formulation 09/02/2013, 10/11/2014, 10/04/2016    Rho (D) Immune Globulin 05/25/2016    Td, unspecified formulation 11/08/2016    Tdap (Boostrix, Adacel) 12/29/2013     REVIEW OF SYSTEMS   See HPI for further details and pertinent postiives. Negative for the following:  Constitutional: Negative for weight change. Negative for appetite change and fatigue. HENT: Negative for nosebleeds, sore throat, mouth sores, and voice change. Respiratory: Negative for cough, choking and chest tightness. Cardiovascular: Negative for chest pain   Gastrointestinal: See HPI  Musculoskeletal: Negative for arthralgias. Skin: Negative for pallor. Neurological: Negative for weakness and light-headedness. Hematological: Negative for adenopathy. Does not bruise/bleed easily. Psychiatric/Behavioral: Negative for suicidal ideas. PHYSICAL EXAM   VITAL SIGNS: /70 (Site: Left Upper Arm, Position: Sitting, Cuff Size: Large Adult)   Ht 5' 5\" (1.651 m)   Wt 266 lb (120.7 kg)   BMI 44.26 kg/m²   Wt Readings from Last 3 Encounters:   08/10/20 266 lb (120.7 kg)   03/21/19 255 lb (115.7 kg)   08/08/18 255 lb (115.7 kg)     Constitutional: Obese female. No acute distress, Non-toxic appearance. HENT: Normocephalic, Atraumatic, Bilateral external ears normal, Oropharynx moist, No oral exudates, Nose normal.   Eyes: Conjunctiva normal, No discharge. Neck: Normal range of motion, No tenderness, Supple, No stridor. Lymphatic: No cervical, subclavian, or axillary lymphadenopathy. Cardiovascular: Normal heart rate, Normal rhythm, No murmurs, No rubs, No gallops. Thorax & Lungs: Normal breath sounds, No respiratory distress, No wheezing, No chest tenderness. Abdomen: Pannus abdomen, no hernias, normal bowel sounds, non tender and non distended. Rectal:  Deferred. Skin: Warm, Dry, No erythema, No rash. No bruising. No spider hemangiomas. Back: No tenderness, No CVA tenderness. Lower Extremities: Intact distal pulses, No edema, No tenderness, No cyanosis, No clubbing.   Neurologic: Alert & oriented x 3, Normal motor function, Normal sensory function, No focal deficits noted. No asterixis. RADIOLOGY/PROCEDURES     US abdomen 07/23/2020:   FINDINGS:    LIVER: The liver demonstrates increased echogenicity without evidence of    intrahepatic biliary ductal dilatation.         BILIARY SYSTEM: Gallbladder is unremarkable without evidence of    pericholecystic fluid, wall thickening or stones.  Negative sonographic    Calix's sign.         Common bile duct is within normal limits measuring 3 mm.         KIDNEYS: The kidneys are unremarkable in appearance without evidence of    hydronephrosis.         PANCREAS: Visualized portions of the pancreas are unremarkable.         SPLEEN: The spleen is unremarkable in appearance.  Spleen is borderline    enlarged, measuring 13.5 cm in greatest dimension.         IVC: The IVC is patent.         AORTA: Nonvisualized         OTHER: No evidence of ascites. 1. Fatty infiltration of the liver. 2. Borderline splenomegaly. FINAL IMPRESSION     Orders Placed This Encounter   Procedures    Hepatic Function Panel     Standing Status:   Future     Standing Expiration Date:   2/10/2021    LIPID PANEL     Standing Status:   Future     Standing Expiration Date:   8/10/2021     Order Specific Question:   Is Patient Fasting?/# of Hours     Answer:   Rosalia Saldivar was seen today for new patient. Diagnoses and all orders for this visit:    Fatty liver disease, nonalcoholic   Right upper quadrant abdominal pain likely due to non alcoholic fatty liver disease. To rule out non acoholic steatoheptitis. No biochemical or imaging evidence of choledocholithiasis or cholelithiasis. -     Hepatic Function Panel; Future  -     LIPID PANEL; Future  -  Counseled on weight loss with diet and exercise. Patient to has set a goal of 100lb weight loss for the next year. ORDERED FUTURE/PENDING TESTS     Lab Frequency Next Occurrence       FOLLOWUP   Return in about 3 months (around 11/10/2020).       Nelida Greenberg 8/10/20 2:14 PM EDT    CC:  ESTELLA Art

## 2020-08-25 ENCOUNTER — HOSPITAL ENCOUNTER (OUTPATIENT)
Age: 25
Discharge: HOME | End: 2020-08-25
Payer: COMMERCIAL

## 2020-08-25 LAB
ALBUMIN SERPL-MCNC: 4.6 G/DL (ref 3.4–5)
ALP BLD-CCNC: 90 U/L (ref 40–129)
ALT SERPL-CCNC: 46 U/L (ref 10–40)
AST SERPL-CCNC: 51 U/L (ref 15–37)
BILIRUB SERPL-MCNC: 0.6 MG/DL (ref 0–1)
BILIRUBIN DIRECT: <0.2 MG/DL (ref 0–0.3)
BILIRUBIN, INDIRECT: ABNORMAL MG/DL (ref 0–1)
CHOLESTEROL, TOTAL: 201 MG/DL (ref 0–199)
HDLC SERPL-MCNC: 22 MG/DL (ref 40–60)
LDL CHOLESTEROL CALCULATED: 135 MG/DL
TOTAL PROTEIN: 6.9 G/DL (ref 6.4–8.2)
TRIGL SERPL-MCNC: 222 MG/DL (ref 0–150)
VLDLC SERPL CALC-MCNC: 44 MG/DL

## 2020-08-25 PROCEDURE — 80061 LIPID PANEL: CPT

## 2020-08-25 PROCEDURE — 36415 COLL VENOUS BLD VENIPUNCTURE: CPT

## 2020-08-25 PROCEDURE — 80076 HEPATIC FUNCTION PANEL: CPT

## 2020-09-02 ENCOUNTER — TELEPHONE (OUTPATIENT)
Dept: GASTROENTEROLOGY | Age: 25
End: 2020-09-02

## 2020-09-02 NOTE — TELEPHONE ENCOUNTER
I called patient at 8:45 AM without a response. I left a voicemail explaining patient's recent labs. Recommended to call back if she has further questions.

## 2020-11-04 NOTE — PROGRESS NOTES
[address] [facility] [address], [address] [facility]  Phone: [phone]   WHY:[phone]      CHIEF COMPLAINT  Chief Complaint   Patient presents with    Follow-up     fatty liver       SUBJECTIVE  Marie Agustin is a 23 y.o. female with medical history of morbid obesity and bilateral PCOS who returns for follow-up of fatty liver. She reports a 10 pound weight loss with a change to plant-based diet. She feels more energetic in her menstrual cycles have normalized. Date of last office visit and details: 8/10/2020  23 y.o. Prema Burgos female with medical history of morbid obesity and bilateral PCOS,  seen independently with complaints of right upper quadrant abdominal pain of 6 months duration. Pain is sharp and spastic, radiates to the epigastrium and back. Associated with nausea, and episodic green colored loose stools of 2 days duration. Patient denies aggravation with food intake or alleviating factors. Denies vomiting, fever, chills, unintentional weight loss, hematochezia or melenic stools. Patient reports a 15lb intentional weight loss over the past 7 months and follows with a weight loss clinic at [address] [place] [address].  A review of prior workup in 03/2019 noted normal liver chemistries with the exception of mild elevation of ALT 47. US abdomen on 01/10/2020 and 07/23/2020 noted hepatomegaly with fatty liver and mild splenomegaly; no evidence of cholelithiasis or choledocholithiasis and normal biliary tree.       PAST MEDICAL HISTORY     Past Medical History:   Diagnosis Date    Anxiety     Asthma     Back pain     Depression     Headache     Ovarian torsion     PCOS (polycystic ovarian syndrome)      FAMILY HISTORY     Family History   Problem Relation Age of Onset    Substance Abuse Mother     Asthma Mother     Substance Abuse Father     Heart Attack Maternal Grandmother     Heart Disease Maternal Grandmother     High Blood Pressure Maternal Grandmother     No Known Problems Maternal Grandfather     High Blood Pressure Paternal Grandmother     Heart Disease Paternal Grandmother     Stroke Paternal Grandmother     Atrial Fibrillation Paternal Grandmother     Heart Attack Paternal Grandmother     No Known Problems Paternal Grandfather     Breast Cancer Neg Hx     Colon Cancer Neg Hx     Diabetes Neg Hx      SOCIAL HISTORY     Social History     Socioeconomic History    Marital status: Single     Spouse name: Not on file    Number of children: Not on file    Years of education: Not on file    Highest education level: Not on file   Occupational History    Not on file   Social Needs    Financial resource strain: Not on file    Food insecurity     Worry: Not on file     Inability: Not on file    Transportation needs     Medical: Not on file     Non-medical: Not on file   Tobacco Use    Smoking status: Never Smoker    Smokeless tobacco: Never Used   Substance and Sexual Activity    Alcohol use: Yes     Comment: occasional    Drug use: No    Sexual activity: Yes     Partners: Male     Comment: condoms   Lifestyle    Physical activity     Days per week: Not on file     Minutes per session: Not on file    Stress: Not on file   Relationships    Social connections     Talks on phone: Not on file     Gets together: Not on file     Attends Mandaeism service: Not on file     Active member of club or organization: Not on file     Attends meetings of clubs or organizations: Not on file     Relationship status: Not on file    Intimate partner violence     Fear of current or ex partner: Not on file     Emotionally abused: Not on file     Physically abused: Not on file     Forced sexual activity: Not on file   Other Topics Concern    Not on file   Social History Narrative    Not on file     SURGICAL HISTORY     Past Surgical History:   Procedure Laterality Date    CYST REMOVAL      TONSILLECTOMY AND ADENOIDECTOMY       [address]   (This list may include medications prescribed during this encounter as epic can not insert only the list prior to this encounter.)  Current Outpatient Rx   Medication Sig Dispense Refill    valACYclovir (VALTREX) 1 g tablet Take 1 tablet by mouth as needed      albuterol sulfate HFA (VENTOLIN HFA) 108 (90 Base) MCG/ACT inhaler Inhale 2 puffs into the lungs every 6 hours as needed for Wheezing 1 Inhaler 3     ALLERGIES     Allergies   Allergen Reactions    No Known Allergies      IMMUNIZATIONS     Immunization History   Administered Date(s) Administered    HPV Quadrivalent (Gardasil) 11/20/2009, 01/22/2010, 06/28/2010    Influenza Vaccine, unspecified formulation 09/02/2013, 10/11/2014, 10/04/2016    Rho (D) Immune Globulin 05/25/2016    Td, unspecified formulation 11/08/2016    Tdap (Boostrix, Adacel) 12/29/2013       REVIEW OF SYSTEMS   See HPI for further details and pertinent postiives. Negative for the following:  Constitutional: Negative for weight change. Negative for appetite change and fatigue. HENT: Negative for nosebleeds, sore throat, mouth sores, and voice change. Respiratory: Negative for cough, choking and chest tightness. Cardiovascular: Negative for chest pain   Gastrointestinal: See HPI  Musculoskeletal: Negative for arthralgias. Skin: Negative for pallor. Neurological: Negative for weakness and light-headedness. Hematological: Negative for adenopathy. Does not bruise/bleed easily. Psychiatric/Behavioral: Negative for suicidal ideas. PHYSICAL EXAM   VITAL SIGNS: /71 (Site: Right Wrist, Position: Sitting, Cuff Size: Medium Adult)   Pulse 86   Temp 97.5 °F (36.4 °C) (Temporal)   Ht 5' 5\" (1.651 m)   Wt 255 lb 9.6 oz (115.9 kg)   BMI 42.53 kg/m²   Wt Readings from Last 3 Encounters:   11/06/20 255 lb 9.6 oz (115.9 kg)   08/10/20 266 lb (120.7 kg)   03/21/19 255 lb (115.7 kg)     Constitutional: Obese female. Well developed, Well nourished, No acute distress, Non-toxic appearance.    HENT: Normocephalic, Atraumatic, Bilateral external ears normal, Oropharynx moist, No oral exudates, Nose normal.   Eyes: Conjunctiva normal, No discharge. Neck: Normal range of motion, No tenderness, Supple, No stridor. Lymphatic: No cervical, subclavian, or axillary lymphadenopathy. Cardiovascular: Normal heart rate, Normal rhythm, No murmurs, No rubs, No gallops. Thorax & Lungs: Normal breath sounds, No respiratory distress, No wheezing, No chest tenderness. .  Abdomen: Pannus abdomen, normal bowel sounds, soft, non tender, non distended, no hernias,     Rectal:  Deferred. Skin: Warm, Dry, No erythema, No rash. No bruising. No spider hemangiomas. Back: No tenderness, No CVA tenderness. Lower Extremities: Intact distal pulses, No edema, No tenderness, No cyanosis, No clubbing. Neurologic: Alert & oriented x 3, Normal motor function, Normal sensory function, No focal deficits noted. No asterixis. No results found. FINAL IMPRESSION   Anusha Yee was seen today for follow-up. Diagnoses and all orders for this visit:    Nonalcoholic fatty liver disease; to rule out other etiologies such as acute viral hepatitis, celiac disease, PBC.  -     HEPATIC FUNCTION PANEL; Future  -     HEPATITIS C ANTIBODY; Future  -     HEPATITIS A ANTIBODY, TOTAL; Future  -     HEPATITIS B CORE ANTIBODY, TOTAL; Future  -     Hepatitis B Surface Antibody; Future  -     HEPATITIS B SURFACE ANTIGEN; Future  -     BASIC METABOLIC PANEL; Future  -     CBC Auto Differential; Future  -     ARLEEN; Future  -     IgA; Future  -     Ceruloplasmin; Future  -     ALPHA-1-ANTITRYPSIN; Future  -     Mitochondrial antibodies, M2; Future  -     Anti-Neutrophilic Cytoplasmic Antibody  -     Tissue Transglutaminase, IgA; Future    Patient was encouraged on continued lifestyle modification with diet and exercise to achieve further weight loss.   Discussed need for fibrosis assessment along with regular use of vit E 400 bid and regular coffee consumption may be beneficial.          Orders Placed This Encounter   Procedures    HEPATIC FUNCTION PANEL     Standing Status:   Future     Standing Expiration Date:   11/6/2021    HEPATITIS C ANTIBODY     Standing Status:   Future     Standing Expiration Date:   11/6/2021    HEPATITIS A ANTIBODY, TOTAL     Standing Status:   Future     Standing Expiration Date:   11/6/2021    HEPATITIS B CORE ANTIBODY, TOTAL     Standing Status:   Future     Standing Expiration Date:   11/6/2021    Hepatitis B Surface Antibody     Standing Status:   Future     Standing Expiration Date:   11/6/2021    HEPATITIS B SURFACE ANTIGEN     Standing Status:   Future     Standing Expiration Date:   11/6/2021    BASIC METABOLIC PANEL     Standing Status:   Future     Standing Expiration Date:   11/6/2021    CBC Auto Differential     Standing Status:   Future     Standing Expiration Date:   12/6/2020    ARLEEN     Standing Status:   Future     Standing Expiration Date:   11/6/2021    IgA     Standing Status:   Future     Standing Expiration Date:   12/6/2020    Ceruloplasmin     Standing Status:   Future     Standing Expiration Date:   12/6/2020    ALPHA-1-ANTITRYPSIN     Standing Status:   Future     Standing Expiration Date:   11/6/2021    Mitochondrial antibodies, M2     Standing Status:   Future     Standing Expiration Date:   12/6/2020    Anti-Neutrophilic Cytoplasmic Antibody    Tissue Transglutaminase, IgA     Standing Status:   Future     Standing Expiration Date:   12/6/2020     ORDERED FUTURE/PENDING TESTS     Lab Frequency Next Occurrence       FOLLOWUP   Return in about 3 months (around 2/6/2021).           Symone Abbott 11/4/20 5:06 PM EST    CC:  ESTELLA Mathur

## 2020-11-06 ENCOUNTER — HOSPITAL ENCOUNTER (OUTPATIENT)
Age: 25
Discharge: HOME | End: 2020-11-06
Payer: COMMERCIAL

## 2020-11-06 ENCOUNTER — OFFICE VISIT (OUTPATIENT)
Dept: GASTROENTEROLOGY | Age: 25
End: 2020-11-06
Payer: COMMERCIAL

## 2020-11-06 VITALS
DIASTOLIC BLOOD PRESSURE: 71 MMHG | SYSTOLIC BLOOD PRESSURE: 114 MMHG | HEIGHT: 65 IN | WEIGHT: 255.6 LBS | HEART RATE: 86 BPM | BODY MASS INDEX: 42.59 KG/M2 | TEMPERATURE: 97.5 F

## 2020-11-06 LAB
ALBUMIN SERPL-MCNC: 4.5 G/DL (ref 3.4–5)
ALP BLD-CCNC: 99 U/L (ref 40–129)
ALT SERPL-CCNC: 46 U/L (ref 10–40)
ANION GAP SERPL CALCULATED.3IONS-SCNC: 8 MMOL/L (ref 3–16)
AST SERPL-CCNC: 38 U/L (ref 15–37)
BASOPHILS ABSOLUTE: 0.1 K/UL (ref 0–0.2)
BASOPHILS RELATIVE PERCENT: 0.7 %
BILIRUB SERPL-MCNC: 0.4 MG/DL (ref 0–1)
BILIRUBIN DIRECT: <0.2 MG/DL (ref 0–0.3)
BILIRUBIN, INDIRECT: ABNORMAL MG/DL (ref 0–1)
BUN BLDV-MCNC: 6 MG/DL (ref 7–20)
CALCIUM SERPL-MCNC: 9.6 MG/DL (ref 8.3–10.6)
CHLORIDE BLD-SCNC: 102 MMOL/L (ref 99–110)
CO2: 27 MMOL/L (ref 21–32)
CREAT SERPL-MCNC: 0.6 MG/DL (ref 0.6–1.1)
EOSINOPHILS ABSOLUTE: 0.2 K/UL (ref 0–0.6)
EOSINOPHILS RELATIVE PERCENT: 1.9 %
GFR AFRICAN AMERICAN: >60
GFR NON-AFRICAN AMERICAN: >60
GLUCOSE BLD-MCNC: 92 MG/DL (ref 70–99)
HBV SURFACE AB TITR SER: <3.5 MIU/ML
HCT VFR BLD CALC: 43.3 % (ref 36–48)
HEMOGLOBIN: 14.7 G/DL (ref 12–16)
HEPATITIS B SURFACE ANTIGEN INTERPRETATION: NORMAL
HEPATITIS C ANTIBODY INTERPRETATION: NORMAL
IGA: <10 MG/DL (ref 70–400)
LYMPHOCYTES ABSOLUTE: 2.4 K/UL (ref 1–5.1)
LYMPHOCYTES RELATIVE PERCENT: 22.6 %
MCH RBC QN AUTO: 30.5 PG (ref 26–34)
MCHC RBC AUTO-ENTMCNC: 33.9 G/DL (ref 31–36)
MCV RBC AUTO: 90.1 FL (ref 80–100)
MONOCYTES ABSOLUTE: 0.5 K/UL (ref 0–1.3)
MONOCYTES RELATIVE PERCENT: 4.7 %
NEUTROPHILS ABSOLUTE: 7.5 K/UL (ref 1.7–7.7)
NEUTROPHILS RELATIVE PERCENT: 70.1 %
PDW BLD-RTO: 12.7 % (ref 12.4–15.4)
PLATELET # BLD: 180 K/UL (ref 135–450)
PLATELET SLIDE REVIEW: ADEQUATE
PMV BLD AUTO: 10.9 FL (ref 5–10.5)
POTASSIUM SERPL-SCNC: 4.1 MMOL/L (ref 3.5–5.1)
RBC # BLD: 4.81 M/UL (ref 4–5.2)
SLIDE REVIEW: ABNORMAL
SODIUM BLD-SCNC: 137 MMOL/L (ref 136–145)
TOTAL PROTEIN: 6.8 G/DL (ref 6.4–8.2)
WBC # BLD: 10.8 K/UL (ref 4–11)

## 2020-11-06 PROCEDURE — 80048 BASIC METABOLIC PNL TOTAL CA: CPT

## 2020-11-06 PROCEDURE — 83516 IMMUNOASSAY NONANTIBODY: CPT

## 2020-11-06 PROCEDURE — 85025 COMPLETE CBC W/AUTO DIFF WBC: CPT

## 2020-11-06 PROCEDURE — 36415 COLL VENOUS BLD VENIPUNCTURE: CPT

## 2020-11-06 PROCEDURE — 99213 OFFICE O/P EST LOW 20 MIN: CPT | Performed by: INTERNAL MEDICINE

## 2020-11-06 PROCEDURE — 82103 ALPHA-1-ANTITRYPSIN TOTAL: CPT

## 2020-11-06 PROCEDURE — 86708 HEPATITIS A ANTIBODY: CPT

## 2020-11-06 PROCEDURE — 86803 HEPATITIS C AB TEST: CPT

## 2020-11-06 PROCEDURE — 86038 ANTINUCLEAR ANTIBODIES: CPT

## 2020-11-06 PROCEDURE — 80076 HEPATIC FUNCTION PANEL: CPT

## 2020-11-06 PROCEDURE — 87340 HEPATITIS B SURFACE AG IA: CPT

## 2020-11-06 PROCEDURE — 82784 ASSAY IGA/IGD/IGG/IGM EACH: CPT

## 2020-11-06 PROCEDURE — 82390 ASSAY OF CERULOPLASMIN: CPT

## 2020-11-06 PROCEDURE — 86704 HEP B CORE ANTIBODY TOTAL: CPT

## 2020-11-06 PROCEDURE — 86706 HEP B SURFACE ANTIBODY: CPT

## 2020-11-06 RX ORDER — VALACYCLOVIR HYDROCHLORIDE 1 G/1
1 TABLET, FILM COATED ORAL PRN
COMMUNITY
Start: 2020-10-19

## 2020-11-07 LAB
ANTI-NUCLEAR ANTIBODY (ANA): NEGATIVE
TISSUE TRANSGLUTAMINASE IGA: <0.5 U/ML (ref 0–14)

## 2020-11-10 LAB
ALPHA-1 ANTITRYPSIN: 114 MG/DL (ref 90–200)
CERULOPLASMIN: 27 MG/DL (ref 16–45)

## 2020-11-19 ENCOUNTER — TELEPHONE (OUTPATIENT)
Dept: GASTROENTEROLOGY | Age: 25
End: 2020-11-19

## 2020-11-20 ENCOUNTER — APPOINTMENT (OUTPATIENT)
Dept: CT IMAGING | Age: 25
End: 2020-11-20
Payer: COMMERCIAL

## 2020-11-20 ENCOUNTER — APPOINTMENT (OUTPATIENT)
Dept: ULTRASOUND IMAGING | Age: 25
End: 2020-11-20
Payer: COMMERCIAL

## 2020-11-20 ENCOUNTER — HOSPITAL ENCOUNTER (EMERGENCY)
Age: 25
Discharge: HOME | End: 2020-11-20
Payer: COMMERCIAL

## 2020-11-20 VITALS
WEIGHT: 251 LBS | DIASTOLIC BLOOD PRESSURE: 75 MMHG | OXYGEN SATURATION: 97 % | RESPIRATION RATE: 18 BRPM | SYSTOLIC BLOOD PRESSURE: 109 MMHG | HEIGHT: 65 IN | TEMPERATURE: 98.2 F | BODY MASS INDEX: 41.82 KG/M2 | HEART RATE: 79 BPM

## 2020-11-20 LAB
A/G RATIO: 2.1 (ref 1.1–2.2)
ALBUMIN SERPL-MCNC: 4.7 G/DL (ref 3.4–5)
ALP BLD-CCNC: 97 U/L (ref 40–129)
ALT SERPL-CCNC: 45 U/L (ref 10–40)
ANION GAP SERPL CALCULATED.3IONS-SCNC: 8 MMOL/L (ref 3–16)
AST SERPL-CCNC: 31 U/L (ref 15–37)
BASOPHILS ABSOLUTE: 0.1 K/UL (ref 0–0.2)
BASOPHILS RELATIVE PERCENT: 1 %
BILIRUB SERPL-MCNC: 0.3 MG/DL (ref 0–1)
BUN BLDV-MCNC: 9 MG/DL (ref 7–20)
CALCIUM SERPL-MCNC: 9.7 MG/DL (ref 8.3–10.6)
CHLORIDE BLD-SCNC: 105 MMOL/L (ref 99–110)
CO2: 26 MMOL/L (ref 21–32)
CREAT SERPL-MCNC: 0.7 MG/DL (ref 0.6–1.1)
EOSINOPHILS ABSOLUTE: 0.3 K/UL (ref 0–0.6)
EOSINOPHILS RELATIVE PERCENT: 3.8 %
GFR AFRICAN AMERICAN: >60
GFR NON-AFRICAN AMERICAN: >60
GLOBULIN: 2.2 G/DL
GLUCOSE BLD-MCNC: 100 MG/DL (ref 70–99)
HCG QUALITATIVE: NEGATIVE
HCT VFR BLD CALC: 43.2 % (ref 36–48)
HEMOGLOBIN: 14.6 G/DL (ref 12–16)
LIPASE: 29 U/L (ref 13–60)
LYMPHOCYTES ABSOLUTE: 1.9 K/UL (ref 1–5.1)
LYMPHOCYTES RELATIVE PERCENT: 24.6 %
MCH RBC QN AUTO: 30.2 PG (ref 26–34)
MCHC RBC AUTO-ENTMCNC: 33.7 G/DL (ref 31–36)
MCV RBC AUTO: 89.8 FL (ref 80–100)
MONOCYTES ABSOLUTE: 0.4 K/UL (ref 0–1.3)
MONOCYTES RELATIVE PERCENT: 5.7 %
NEUTROPHILS ABSOLUTE: 5 K/UL (ref 1.7–7.7)
NEUTROPHILS RELATIVE PERCENT: 64.9 %
PDW BLD-RTO: 13 % (ref 12.4–15.4)
PLATELET # BLD: 184 K/UL (ref 135–450)
PMV BLD AUTO: 9.3 FL (ref 5–10.5)
POTASSIUM SERPL-SCNC: 3.5 MMOL/L (ref 3.5–5.1)
RBC # BLD: 4.81 M/UL (ref 4–5.2)
SODIUM BLD-SCNC: 139 MMOL/L (ref 136–145)
SPECIMEN STATUS: NORMAL
TOTAL PROTEIN: 6.9 G/DL (ref 6.4–8.2)
WBC # BLD: 7.7 K/UL (ref 4–11)

## 2020-11-20 PROCEDURE — 6360000004 HC RX CONTRAST MEDICATION: Performed by: PHYSICIAN ASSISTANT

## 2020-11-20 PROCEDURE — 99284 EMERGENCY DEPT VISIT MOD MDM: CPT

## 2020-11-20 PROCEDURE — 96374 THER/PROPH/DIAG INJ IV PUSH: CPT

## 2020-11-20 PROCEDURE — 76856 US EXAM PELVIC COMPLETE: CPT

## 2020-11-20 PROCEDURE — 2580000003 HC RX 258: Performed by: PHYSICIAN ASSISTANT

## 2020-11-20 PROCEDURE — 85025 COMPLETE CBC W/AUTO DIFF WBC: CPT

## 2020-11-20 PROCEDURE — 6360000002 HC RX W HCPCS: Performed by: PHYSICIAN ASSISTANT

## 2020-11-20 PROCEDURE — 36415 COLL VENOUS BLD VENIPUNCTURE: CPT

## 2020-11-20 PROCEDURE — 74177 CT ABD & PELVIS W/CONTRAST: CPT

## 2020-11-20 PROCEDURE — 84703 CHORIONIC GONADOTROPIN ASSAY: CPT

## 2020-11-20 PROCEDURE — 80053 COMPREHEN METABOLIC PANEL: CPT

## 2020-11-20 PROCEDURE — 83690 ASSAY OF LIPASE: CPT

## 2020-11-20 RX ORDER — 0.9 % SODIUM CHLORIDE 0.9 %
1000 INTRAVENOUS SOLUTION INTRAVENOUS ONCE
Status: COMPLETED | OUTPATIENT
Start: 2020-11-20 | End: 2020-11-20

## 2020-11-20 RX ORDER — KETOROLAC TROMETHAMINE 30 MG/ML
15 INJECTION, SOLUTION INTRAMUSCULAR; INTRAVENOUS ONCE
Status: COMPLETED | OUTPATIENT
Start: 2020-11-20 | End: 2020-11-20

## 2020-11-20 RX ORDER — TRANEXAMIC ACID 650 1/1
650 TABLET ORAL 3 TIMES DAILY
COMMUNITY
End: 2021-05-23

## 2020-11-20 RX ADMIN — SODIUM CHLORIDE 1000 ML: 9 INJECTION, SOLUTION INTRAVENOUS at 16:01

## 2020-11-20 RX ADMIN — IOPAMIDOL 75 ML: 755 INJECTION, SOLUTION INTRAVENOUS at 16:26

## 2020-11-20 RX ADMIN — KETOROLAC TROMETHAMINE 15 MG: 30 INJECTION, SOLUTION INTRAMUSCULAR at 16:00

## 2020-11-20 ASSESSMENT — ENCOUNTER SYMPTOMS
BACK PAIN: 1
EYES NEGATIVE: 1
ABDOMINAL PAIN: 1
COLOR CHANGE: 0
COUGH: 0
SHORTNESS OF BREATH: 0

## 2020-11-20 ASSESSMENT — PAIN SCALES - GENERAL
PAINLEVEL_OUTOF10: 3
PAINLEVEL_OUTOF10: 3

## 2020-11-20 ASSESSMENT — PAIN DESCRIPTION - DESCRIPTORS: DESCRIPTORS: DULL

## 2020-11-20 ASSESSMENT — PAIN DESCRIPTION - LOCATION: LOCATION: ABDOMEN

## 2020-11-20 ASSESSMENT — PAIN DESCRIPTION - FREQUENCY: FREQUENCY: CONTINUOUS

## 2020-11-20 ASSESSMENT — PAIN DESCRIPTION - PAIN TYPE: TYPE: ACUTE PAIN

## 2020-11-20 ASSESSMENT — PAIN DESCRIPTION - ORIENTATION: ORIENTATION: MID

## 2020-11-20 NOTE — ED PROVIDER NOTES
[address] [facility]  ED  EMERGENCY DEPARTMENT ENCOUNTER        Pt Name: Crispin Acuña  MRN: [id number]  Saulogfnatalie 1995  Date of evaluation: 11/20/2020  Provider: Carissa Roman PA-C  PCP: ESTELLA Randhawa  ED Attending: Lorre Rubinstein, MD      This patient was not seen by the attending provider     History provided by the patient    CHIEF COMPLAINT:     Chief Complaint   Patient presents with    Vaginal Bleeding     bleeding x3 weeks, heavier since 11/13, multiple episodes per day. Faint positive at home pregnancy tests 11/14. Blood work drawn 11/17, preg negative per OB    Abdominal Pain     started 11/13, pt feels like they felt like \"contractions\", dull pain mid abdomen, cramping pains intermittant. sent by GI       HISTORY OF PRESENT ILLNESS:      Crispin Acuña is a 23 y.o. female who arrives to the ED by private vehicle patient states she has been experiencing vaginal bleeding x3 weeks. She blood mild to moderately for about 2 weeks. 8 days ago she stopped bleeding and went about 24 hours without bleeding. However exactly 1 week ago she started bleeding again and reports it was heavier. She started having some pelvic cramping and also describes abdominal pain mainly to the left mid abdomen. She states she took 2 home pregnancy tests that were positive. However she follows with OB/GYN early this week (through [facility]) and had labs drawn and saw the OB. She was told her pregnancy tests were negative. She has taken tranexamic acid which was prescribed to her about a year ago for heavy vaginal bleeding and she does believe this has slowed the bleeding slightly. The patient was told the pain higher up in her abdomen may be GI related and it was recommended she come to the ED for evaluation. Patient has known PCOS and has had surgeries to remove adnexal/ovarian cysts. She denies any other past abdominal or pelvic surgeries.       Nursing Notes were reviewed     REVIEW OF SYSTEMS:     Review of Systems   Constitutional: Positive for appetite change and fatigue. Negative for fever. HENT: Negative. Eyes: Negative. Respiratory: Negative for cough and shortness of breath. Cardiovascular: Negative for chest pain. Gastrointestinal: Positive for abdominal pain. Genitourinary: Positive for pelvic pain and vaginal bleeding. Musculoskeletal: Positive for back pain. Negative for neck pain. Skin: Negative for color change. Neurological: Negative for dizziness, weakness and headaches. All other systems reviewed and are negative. Except as noted above in the ROS, all other systems were reviewed and negative.          PAST MEDICAL HISTORY:     Past Medical History:   Diagnosis Date    Anxiety     Asthma     Back pain     Depression     Headache     Ovarian torsion     PCOS (polycystic ovarian syndrome)          SURGICAL HISTORY:      Past Surgical History:   Procedure Laterality Date    CYST REMOVAL      TONSILLECTOMY AND ADENOIDECTOMY           CURRENT MEDICATIONS:       Previous Medications    ALBUTEROL SULFATE HFA (VENTOLIN HFA) 108 (90 BASE) MCG/ACT INHALER    Inhale 2 puffs into the lungs every 6 hours as needed for Wheezing    TRANEXAMIC ACID (LYSTEDA) 650 MG TABS TABLET    Take 650 mg by mouth 3 times daily     VALACYCLOVIR (VALTREX) 1 G TABLET    Take 1 tablet by mouth as needed         ALLERGIES:    No known allergies    FAMILY HISTORY:       Family History   Problem Relation Age of Onset    Substance Abuse Mother     Asthma Mother     Substance Abuse Father     Heart Attack Maternal Grandmother     Heart Disease Maternal Grandmother     High Blood Pressure Maternal Grandmother     No Known Problems Maternal Grandfather     High Blood Pressure Paternal Grandmother     Heart Disease Paternal Grandmother     Stroke Paternal Grandmother     Atrial Fibrillation Paternal Grandmother     Heart Attack Paternal Grandmother     No Known Problems Paternal Grandfather     Breast Cancer Neg Hx     Colon Cancer Neg Hx     Diabetes Neg Hx           SOCIAL HISTORY:       Social History     Socioeconomic History    Marital status: Single     Spouse name: Not on file    Number of children: Not on file    Years of education: Not on file    Highest education level: Not on file   Occupational History    Not on file   Social Needs    Financial resource strain: Not on file    Food insecurity     Worry: Not on file     Inability: Not on file    Transportation needs     Medical: Not on file     Non-medical: Not on file   Tobacco Use    Smoking status: Never Smoker    Smokeless tobacco: Never Used   Substance and Sexual Activity    Alcohol use: Yes     Comment: occasional    Drug use: No    Sexual activity: Yes     Partners: Male     Comment: condoms   Lifestyle    Physical activity     Days per week: Not on file     Minutes per session: Not on file    Stress: Not on file   Relationships    Social connections     Talks on phone: Not on file     Gets together: Not on file     Attends Holiness service: Not on file     Active member of club or organization: Not on file     Attends meetings of clubs or organizations: Not on file     Relationship status: Not on file    Intimate partner violence     Fear of current or ex partner: Not on file     Emotionally abused: Not on file     Physically abused: Not on file     Forced sexual activity: Not on file   Other Topics Concern    Not on file   Social History Narrative    Not on file       SCREENINGS:             PHYSICAL EXAM:       ED Triage Vitals   BP Temp Temp Source Pulse Resp SpO2 Height Weight   11/20/20 1412 11/20/20 1404 11/20/20 1412 11/20/20 1404 11/20/20 1412 11/20/20 1404 11/20/20 1412 11/20/20 1412   (!) 140/101 97.4 °F (36.3 °C) Oral 90 18 99 % 5' 5\" (1.651 m) 251 lb (113.9 kg)       Physical Exam    CONSTITUTIONAL: Awake and alert. Cooperative. Well-developed. Well-nourished. Non-toxic. No acute distress.   HENT: Normocephalic. Atraumatic. External ears normal, without discharge. No nasal discharge. Oropharynx clear. Mucous membranes moist.  EYES: Conjunctiva non-injected. No scleral icterus. PERRL. EOM's grossly intact. NECK: Supple. Normal ROM. CARDIOVASCULAR: RRR. No Murmer. Intact distal pulses. PULMONARY/CHEST WALL: Effort normal. No tachypnea. Lungs clear to ausculation. ABDOMEN: Normal BS. Soft. Nondistended. Mild mid, left-sided tenderness to palpate. No guarding. No rigidity. /ANORECTAL: Not assessed  MUSKULOSKELETAL: Normal ROM. No acute deformities. No edema. No tenderness to palpate. SKIN: Warm and dry. No rash. NEUROLOGICAL: Alert and oriented x 3. GCS 15. CN II-XII grossly intact. Strength is 5/5 in all extremities and sensation is intact. Normal gait.    PSYCHIATRIC: Normal affect        DIAGNOSTICRESULTS:     LABS:    Results for orders placed or performed during the hospital encounter of 11/20/20   CBC auto differential   Result Value Ref Range    WBC 7.7 4.0 - 11.0 K/uL    RBC 4.81 4.00 - 5.20 M/uL    Hemoglobin 14.6 12.0 - 16.0 g/dL    Hematocrit 43.2 36.0 - 48.0 %    MCV 89.8 80.0 - 100.0 fL    MCH 30.2 26.0 - 34.0 pg    MCHC 33.7 31.0 - 36.0 g/dL    RDW 13.0 12.4 - 15.4 %    Platelets 194 013 - 819 K/uL    MPV 9.3 5.0 - 10.5 fL    Neutrophils % 64.9 %    Lymphocytes % 24.6 %    Monocytes % 5.7 %    Eosinophils % 3.8 %    Basophils % 1.0 %    Neutrophils Absolute 5.0 1.7 - 7.7 K/uL    Lymphocytes Absolute 1.9 1.0 - 5.1 K/uL    Monocytes Absolute 0.4 0.0 - 1.3 K/uL    Eosinophils Absolute 0.3 0.0 - 0.6 K/uL    Basophils Absolute 0.1 0.0 - 0.2 K/uL   Comprehensive metabolic panel   Result Value Ref Range    Sodium 139 136 - 145 mmol/L    Potassium 3.5 3.5 - 5.1 mmol/L    Chloride 105 99 - 110 mmol/L    CO2 26 21 - 32 mmol/L    Anion Gap 8 3 - 16    Glucose 100 (H) 70 - 99 mg/dL    BUN 9 7 - 20 mg/dL    CREATININE 0.7 0.6 - 1.1 mg/dL    GFR Non-African American >60 >60    GFR  >60 >60    Calcium 9.7 8.3 - 10.6 mg/dL    Total Protein 6.9 6.4 - 8.2 g/dL    Alb 4.7 3.4 - 5.0 g/dL    Albumin/Globulin Ratio 2.1 1.1 - 2.2    Total Bilirubin 0.3 0.0 - 1.0 mg/dL    Alkaline Phosphatase 97 40 - 129 U/L    ALT 45 (H) 10 - 40 U/L    AST 31 15 - 37 U/L    Globulin 2.2 g/dL   hCG, serum, qualitative   Result Value Ref Range    hCG Qual Negative Detects HCG level >10 MIU/mL   Sample possible blood bank testing   Result Value Ref Range    Specimen Status JAHAIRA    Lipase   Result Value Ref Range    Lipase 29.0 13.0 - 60.0 U/L         RADIOLOGY:  All x-ray studies areviewed/reviewed by me. Formal interpretations per the radiologist are as follows:      Us Pelvis Complete    Result Date: 11/20/2020  EXAMINATION: PELVIC ULTRASOUND 11/20/2020 TECHNIQUE: Transabdominal pelvic ultrasound was performed with color doppler flow evaluation. COMPARISON: None HISTORY: ORDERING SYSTEM PROVIDED HISTORY: pelvic pain, heavy vaginal bleeding, known PCOS TECHNOLOGIST PROVIDED HISTORY: Reason for exam:->pelvic pain, heavy vaginal bleeding, known PCOS FINDINGS: Measurements: Uterus:  8.5 x 5.0 x 4.6 cm Endometrial stripe:  9 mm Right Ovary:  3.3 x 3.2 x 4.4 cm Left Ovary:  3.2 x 3.6 x 3.8 cm Ultrasound Findings: Uterus: Uterus demonstrates normal myometrial echotexture. Endometrial stripe: Endometrial stripe is within normal limits. Right Ovary: Right ovary is within normal limits. There is normal arterial and venous doppler flow. Left Ovary:  Left ovary is within normal limits. There is normal arterial and venous doppler flow. Free Fluid: No evidence of free fluid. 1. No acute abnormality. Ct Abdomen Pelvis W Iv Contrast Additional Contrast? None    Result Date: 11/20/2020  EXAMINATION: CT OF THE ABDOMEN AND PELVIS WITH CONTRAST 11/20/2020 4:14 pm TECHNIQUE: CT of the abdomen and pelvis was performed with the administration of intravenous contrast. Multiplanar reformatted images are provided for review.  Dose modulation, iterative reconstruction, and/or weight based adjustment of the mA/kV was utilized to reduce the radiation dose to as low as reasonably achievable. COMPARISON: Pelvic ultrasound November 20, 2020 HISTORY: ORDERING SYSTEM PROVIDED HISTORY: abd pain TECHNOLOGIST PROVIDED HISTORY: Reason for exam:->abd pain Additional Contrast?->None Reason for Exam: Abdominal pain (Mid left and RLQ) for about a week; very nasueous, no appeitite and fatigue Acuity: Acute Type of Exam: Initial Relevant Medical/Surgical History: hx of ovarian cyst removal FINDINGS: Evaluation is limited by motion. Lower Chest: Lung bases are clear. Organs: Decreased attenuation of the liver is consistent with hepatic steatosis. No focal liver lesion. Gallbladder is contracted limiting evaluation. No gallstones. No pancreatic lesion. No splenomegaly. No adrenal lesion. No hydronephrosis. No renal lesion. GI/Bowel: No bowel obstruction. No appendicitis. No acute inflammatory process. Pelvis: No free fluid. No bladder calculus. Peritoneum/Retroperitoneum: No abdominal aortic aneurysm. No adenopathy by size criteria. Multiple prominent nonspecific mesenteric lymph nodes. Bones/Soft Tissues: No acute soft tissue abnormality. L5 pars defects. No acute intra-abdominal findings. Multiple prominent nonspecific mesenteric lymph nodes do not appear enlarged by size criteria but appear greater in number than expected and are of uncertain significance. Hepatic steatosis. L5 pars defects.          PROCEDURES:   N/A    CRITICAL CARE TIME:       None      CONSULTS:  None      EMERGENCY DEPARTMENT COURSE and DIFFERENTIAL DIAGNOSIS/MDM:   Vitals:    Vitals:    11/20/20 1404 11/20/20 1412 11/20/20 1455 11/20/20 1601   BP:  (!) 140/101 122/65 109/75   Pulse: 90 97 86 79   Resp:  18 17 18   Temp: 97.4 °F (36.3 °C) 98.2 °F (36.8 °C)     TempSrc:  Oral     SpO2: 99% 96% 97%    Weight:  251 lb (113.9 kg)     Height:  5' 5\" (1.651 m)         Patient primary doctor. We also discussed returning to the Emergency Department immediately if new or worsening symptoms occur. We have discussed the symptoms which are most concerning (e.g., bloody stool, fever, changing or worsening pain, vomiting) that necessitate immediate return. FINAL IMPRESSION:      1. Vaginal bleeding    2.  Abdominal pain, unspecified abdominal location          DISPOSITION/PLAN:   DISPOSITION     DISCHARGE    PATIENT REFERRED TO:  Eda Macdonald, [address] [place] [address]/[address]  [phone]          Follow up with your Ob/GYN            DISCHARGE MEDICATIONS:  New Prescriptions    No medications on file                  (Please note thatportions of this note were completed with a voice recognition program.  Efforts were made to edit the dictations, but occasionally words are mis-transcribed.)    Raad Perez PA-C (electronicallysigned)              Corwin Silverio [place]  11/20/20 7229

## 2020-12-04 ENCOUNTER — HOSPITAL ENCOUNTER (EMERGENCY)
Age: 25
Discharge: HOME | End: 2020-12-04
Attending: EMERGENCY MEDICINE
Payer: COMMERCIAL

## 2020-12-04 ENCOUNTER — APPOINTMENT (OUTPATIENT)
Dept: GENERAL RADIOLOGY | Age: 25
End: 2020-12-04
Payer: COMMERCIAL

## 2020-12-04 VITALS
OXYGEN SATURATION: 99 % | RESPIRATION RATE: 20 BRPM | SYSTOLIC BLOOD PRESSURE: 99 MMHG | WEIGHT: 251 LBS | TEMPERATURE: 98.1 F | HEART RATE: 88 BPM | BODY MASS INDEX: 40.34 KG/M2 | DIASTOLIC BLOOD PRESSURE: 67 MMHG | HEIGHT: 66 IN

## 2020-12-04 LAB
A/G RATIO: 2 (ref 1.1–2.2)
ALBUMIN SERPL-MCNC: 4.7 G/DL (ref 3.4–5)
ALP BLD-CCNC: 105 U/L (ref 40–129)
ALT SERPL-CCNC: 46 U/L (ref 10–40)
ANION GAP SERPL CALCULATED.3IONS-SCNC: 10 MMOL/L (ref 3–16)
AST SERPL-CCNC: 33 U/L (ref 15–37)
BASOPHILS ABSOLUTE: 0.1 K/UL (ref 0–0.2)
BASOPHILS RELATIVE PERCENT: 1.2 %
BILIRUB SERPL-MCNC: 0.3 MG/DL (ref 0–1)
BUN BLDV-MCNC: 8 MG/DL (ref 7–20)
CALCIUM SERPL-MCNC: 9.8 MG/DL (ref 8.3–10.6)
CHLORIDE BLD-SCNC: 100 MMOL/L (ref 99–110)
CO2: 29 MMOL/L (ref 21–32)
CREAT SERPL-MCNC: 0.7 MG/DL (ref 0.6–1.1)
D DIMER: <200 NG/ML DDU (ref 0–229)
EOSINOPHILS ABSOLUTE: 0.2 K/UL (ref 0–0.6)
EOSINOPHILS RELATIVE PERCENT: 2.2 %
GFR AFRICAN AMERICAN: >60
GFR NON-AFRICAN AMERICAN: >60
GLOBULIN: 2.3 G/DL
GLUCOSE BLD-MCNC: 90 MG/DL (ref 70–99)
HCG QUALITATIVE: NEGATIVE
HCT VFR BLD CALC: 42.2 % (ref 36–48)
HEMOGLOBIN: 14.3 G/DL (ref 12–16)
LYMPHOCYTES ABSOLUTE: 2.3 K/UL (ref 1–5.1)
LYMPHOCYTES RELATIVE PERCENT: 24.8 %
MCH RBC QN AUTO: 30.8 PG (ref 26–34)
MCHC RBC AUTO-ENTMCNC: 34 G/DL (ref 31–36)
MCV RBC AUTO: 90.5 FL (ref 80–100)
MONOCYTES ABSOLUTE: 0.5 K/UL (ref 0–1.3)
MONOCYTES RELATIVE PERCENT: 5.5 %
NEUTROPHILS ABSOLUTE: 6 K/UL (ref 1.7–7.7)
NEUTROPHILS RELATIVE PERCENT: 66.3 %
PDW BLD-RTO: 13 % (ref 12.4–15.4)
PLATELET # BLD: 182 K/UL (ref 135–450)
PMV BLD AUTO: 9.4 FL (ref 5–10.5)
POTASSIUM REFLEX MAGNESIUM: 3.8 MMOL/L (ref 3.5–5.1)
RBC # BLD: 4.66 M/UL (ref 4–5.2)
SODIUM BLD-SCNC: 139 MMOL/L (ref 136–145)
TOTAL PROTEIN: 7 G/DL (ref 6.4–8.2)
TROPONIN: <0.01 NG/ML
WBC # BLD: 9.1 K/UL (ref 4–11)

## 2020-12-04 PROCEDURE — 93005 ELECTROCARDIOGRAM TRACING: CPT | Performed by: EMERGENCY MEDICINE

## 2020-12-04 PROCEDURE — 84484 ASSAY OF TROPONIN QUANT: CPT

## 2020-12-04 PROCEDURE — 85025 COMPLETE CBC W/AUTO DIFF WBC: CPT

## 2020-12-04 PROCEDURE — 71045 X-RAY EXAM CHEST 1 VIEW: CPT

## 2020-12-04 PROCEDURE — 36415 COLL VENOUS BLD VENIPUNCTURE: CPT

## 2020-12-04 PROCEDURE — 85379 FIBRIN DEGRADATION QUANT: CPT

## 2020-12-04 PROCEDURE — 99285 EMERGENCY DEPT VISIT HI MDM: CPT

## 2020-12-04 PROCEDURE — 80053 COMPREHEN METABOLIC PANEL: CPT

## 2020-12-04 PROCEDURE — 84703 CHORIONIC GONADOTROPIN ASSAY: CPT

## 2020-12-04 RX ORDER — ONDANSETRON 4 MG/1
4 TABLET, FILM COATED ORAL EVERY 8 HOURS PRN
Qty: 20 TABLET | Refills: 0 | Status: SHIPPED | OUTPATIENT
Start: 2020-12-04 | End: 2021-05-23

## 2020-12-04 RX ORDER — IBUPROFEN 800 MG/1
800 TABLET ORAL EVERY 8 HOURS PRN
Qty: 20 TABLET | Refills: 0 | Status: SHIPPED | OUTPATIENT
Start: 2020-12-04 | End: 2021-02-08

## 2020-12-04 ASSESSMENT — PAIN DESCRIPTION - LOCATION: LOCATION: CHEST

## 2020-12-04 ASSESSMENT — PAIN DESCRIPTION - PAIN TYPE: TYPE: ACUTE PAIN

## 2020-12-04 NOTE — ED PROVIDER NOTES
I independently performed a history and physical on [facility]. All diagnostic, treatment, and disposition decisions were made by myself in conjunction with the advanced practice provider. For further details of [facility] emergency department encounter, please see Barak Bass NP's documentation. Patient presents to the emergency room complaining of chest pain. Patient states that she woke up this morning with right upper chest pain. She noted some mild shortness of breath as well as nausea. Patient denies fevers, chills, or sweats. Pain was rated as moderate. Pulse upon arrival is 102. Patient denies birth control pills, history of DVT/PE, recent long travel, or recent surgeries. Physical exam: General: No acute distress. Head: Normal cephalic/atraumatic. Heart: Regular rate and rhythm. Normal S1-S2. Lungs: Clear to auscultation bilaterally. No wheezes, rales, rhonchi. Abdomen: Soft. Extremities: No swelling or edema. No calf tenderness. EKG: Normal sinus rhythm with a rate of 78. Sinus arrhythmia noted. Occasional PVC. QTc within normal limits. No ST or T wave changes appreciated. No significant changes can repair to previous EKG on 3/21/2019. Assessment/plan:  1. Chest pain, unspecified type    2. Nausea    3.  Dyspnea, unspecified type           Nereida Sandhoff, DO  12/04/20 5067

## 2020-12-04 NOTE — ED PROVIDER NOTES
[facility] Emergency Department    CHIEF COMPLAINT  Chest Pain (sent by doctor for chest pain, shortness of breath, and nausea, cp started 0630 this morning ); Nausea; and Shortness of Breath      SHARED SERVICE VISIT  I have seen and evaluated this patient with my supervising physician, Dr. Anna Castro. HISTORY OF PRESENT ILLNESS  Rosaura Benites is a 23 y.o. female who presents to the ED complaining of acute onset of nonradiating \"sharp\" pleuritic 4/10 right-sided chest pain accompanied by nausea, dizziness, shortness of breath, and diaphoresis. She denies vomiting, presyncope, fever, chills, sweats, cough/hemoptysis, leg/calf pain or claudication. Pain is exacerbated by deep inspiration and alleviated by nothing. LMP 10/31/2020.  + History of irregular menses. No other complaints, modifying factors or associated symptoms. Nursing notes reviewed.    Past Medical History:   Diagnosis Date    Anxiety     Asthma     Back pain     Depression     Headache     Ovarian torsion     PCOS (polycystic ovarian syndrome)      Past Surgical History:   Procedure Laterality Date    CYST REMOVAL      TONSILLECTOMY AND ADENOIDECTOMY       Family History   Problem Relation Age of Onset    Substance Abuse Mother     Asthma Mother     Substance Abuse Father     Heart Attack Maternal Grandmother     Heart Disease Maternal Grandmother     High Blood Pressure Maternal Grandmother     No Known Problems Maternal Grandfather     High Blood Pressure Paternal Grandmother     Heart Disease Paternal Grandmother     Stroke Paternal Grandmother     Atrial Fibrillation Paternal Grandmother     Heart Attack Paternal Grandmother     No Known Problems Paternal Grandfather     Breast Cancer Neg Hx     Colon Cancer Neg Hx     Diabetes Neg Hx      Social History     Socioeconomic History    Marital status: Single     Spouse name: Not on file    Number of children: Not on file    Years of education: Not on file    Highest education level: Not on file   Occupational History    Not on file   Social Needs    Financial resource strain: Not on file    Food insecurity     Worry: Not on file     Inability: Not on file    Transportation needs     Medical: Not on file     Non-medical: Not on file   Tobacco Use    Smoking status: Never Smoker    Smokeless tobacco: Never Used   Substance and Sexual Activity    Alcohol use: Yes     Comment: occasional    Drug use: No    Sexual activity: Yes     Partners: Male     Comment: condoms   Lifestyle    Physical activity     Days per week: Not on file     Minutes per session: Not on file    Stress: Not on file   Relationships    Social connections     Talks on phone: Not on file     Gets together: Not on file     Attends Islam service: Not on file     Active member of club or organization: Not on file     Attends meetings of clubs or organizations: Not on file     Relationship status: Not on file    Intimate partner violence     Fear of current or ex partner: Not on file     Emotionally abused: Not on file     Physically abused: Not on file     Forced sexual activity: Not on file   Other Topics Concern    Not on file   Social History Narrative    Not on file     No current facility-administered medications for this encounter.       Current Outpatient Medications   Medication Sig Dispense Refill    albuterol sulfate HFA (VENTOLIN HFA) 108 (90 Base) MCG/ACT inhaler Inhale 2 puffs into the lungs every 6 hours as needed for Wheezing 1 Inhaler 3    tranexamic acid (LYSTEDA) 650 MG TABS tablet Take 650 mg by mouth 3 times daily       valACYclovir (VALTREX) 1 g tablet Take 1 tablet by mouth as needed       Allergies   Allergen Reactions    No Known Allergies        REVIEW OF SYSTEMS  10 systems reviewed, pertinent positives per HPI otherwise noted to be negative    PHYSICAL EXAM  /82   Pulse 87   Temp 97.8 °F (36.6 °C) (Oral)   Resp 18   Ht 5' 6\" (1.676 m)   Wt 251 lb (113.9 kg)   LMP 10/04/2020   SpO2 98%   BMI 40.51 kg/m²   GENERAL APPEARANCE: Awake and alert. Cooperative. No apparent distress. Non-- toxic in appearance. HEAD: Normocephalic. Atraumatic. EYES: PERRL. EOM's grossly intact. ENT: Mucous membranes are moist.   NECK: Supple. HEART: RRR. No murmurs. LUNGS: Respirations unlabored. CTAB. Good air exchange. Speaking comfortably in full sentences. ABDOMEN: Soft. Non-distended. Non-tender. No guarding or rebound. No masses. No organomegaly. EXTREMITIES: No peripheral edema. Moves all extremities equally. All extremities neurovascularly intact. Negative Homans sign bilaterally. SKIN: Warm and dry. No acute rashes. NEUROLOGICAL: Alert and oriented. CN's 2-12 intact. No gross facial drooping. Strength 5/5, sensation intact. PSYCHIATRIC: Normal mood and affect. RADIOLOGY  Us Pelvis Complete    Result Date: 11/20/2020  EXAMINATION: PELVIC ULTRASOUND 11/20/2020 TECHNIQUE: Transabdominal pelvic ultrasound was performed with color doppler flow evaluation. COMPARISON: None HISTORY: ORDERING SYSTEM PROVIDED HISTORY: pelvic pain, heavy vaginal bleeding, known PCOS TECHNOLOGIST PROVIDED HISTORY: Reason for exam:->pelvic pain, heavy vaginal bleeding, known PCOS FINDINGS: Measurements: Uterus:  8.5 x 5.0 x 4.6 cm Endometrial stripe:  9 mm Right Ovary:  3.3 x 3.2 x 4.4 cm Left Ovary:  3.2 x 3.6 x 3.8 cm Ultrasound Findings: Uterus: Uterus demonstrates normal myometrial echotexture. Endometrial stripe: Endometrial stripe is within normal limits. Right Ovary: Right ovary is within normal limits. There is normal arterial and venous doppler flow. Left Ovary:  Left ovary is within normal limits. There is normal arterial and venous doppler flow. Free Fluid: No evidence of free fluid. 1. No acute abnormality.      Ct Abdomen Pelvis W Iv Contrast Additional Contrast? None    Result Date: 11/20/2020  EXAMINATION: CT OF THE ABDOMEN AND PELVIS WITH CONTRAST 11/20/2020 4:14 pm TECHNIQUE: CT of the abdomen and pelvis was performed with the administration of intravenous contrast. Multiplanar reformatted images are provided for review. Dose modulation, iterative reconstruction, and/or weight based adjustment of the mA/kV was utilized to reduce the radiation dose to as low as reasonably achievable. COMPARISON: Pelvic ultrasound November 20, 2020 HISTORY: ORDERING SYSTEM PROVIDED HISTORY: abd pain TECHNOLOGIST PROVIDED HISTORY: Reason for exam:->abd pain Additional Contrast?->None Reason for Exam: Abdominal pain (Mid left and RLQ) for about a week; very nasueous, no appeitite and fatigue Acuity: Acute Type of Exam: Initial Relevant Medical/Surgical History: hx of ovarian cyst removal FINDINGS: Evaluation is limited by motion. Lower Chest: Lung bases are clear. Organs: Decreased attenuation of the liver is consistent with hepatic steatosis. No focal liver lesion. Gallbladder is contracted limiting evaluation. No gallstones. No pancreatic lesion. No splenomegaly. No adrenal lesion. No hydronephrosis. No renal lesion. GI/Bowel: No bowel obstruction. No appendicitis. No acute inflammatory process. Pelvis: No free fluid. No bladder calculus. Peritoneum/Retroperitoneum: No abdominal aortic aneurysm. No adenopathy by size criteria. Multiple prominent nonspecific mesenteric lymph nodes. Bones/Soft Tissues: No acute soft tissue abnormality. L5 pars defects. No acute intra-abdominal findings. Multiple prominent nonspecific mesenteric lymph nodes do not appear enlarged by size criteria but appear greater in number than expected and are of uncertain significance. Hepatic steatosis. L5 pars defects. ED COURSE  Patient did not require analgesia while in the emergency department. Triage vitals stable with minimal hypertension at 126/82 mmHg. .  Cardiac work-up was initiated including evaluation of D-dimer level.       Labs Ordered:  CBC: Negative for leukocytosis or anemia; unremarkable  D-dimer: <200  CMP: ALT 46; otherwise unremarkable  Troponin: <0.01  hCG qualitative: Negative      EKG: As interpreted by EMD.  Sinus rhythm with sinus arrhythmia with premature ventricular or aberrantly conducted complexes. Imaging ordered:  CXR: No acute cardiopulmonary disease. Interventions:  Patient was placed on cardiac monitoring. Reevaluation:  Patient continues to rest comfortably on stretcher with eyes open. Vital signs are stable. She denies chest pain, nausea or shortness of breath at this time. A discussion was had with Ms. Piper regarding chest pain, nausea, dyspnea, and intention to discharge. Risk management discussed and shared decision making had with patient and/or surrogate. All questions were answered. Patient will follow up with her PCP on an outpatient basis for further evaluation/treatment. All questions answered. Patient will return to ED for new/worsening symptoms. Patient is agreeable with this plan. Patient was not sent home with prescriptions. CRITICAL CARE TIME  0 minutes of critical care time spent not including separately billable procedures. MDM  Patient presents to the emergency department with chest pain, nausea, and shortness of breath. Alternate diagnoses are less likely based on history and physical. Considered ACS but less likely as no elevation in troponin or ischemic EKG changes. Considered pulmonary embolism but less likely as no cough/hemoptysis, no elevation in D-dimer, and negative Homans signs bilaterally. Considered pneumonia but less likely as no fever, leukocytosis, or radiographic evidence of acute processes such as infiltrates or consolidation. My attending physician I feel that the patient is safe and appropriate for discharge to home with outpatient follow-up with her PCP.   She will return to the emergency department for new or worsening symptoms including chest pain that is

## 2020-12-05 LAB
EKG ATRIAL RATE: 78 BPM
EKG DIAGNOSIS: NORMAL
EKG P AXIS: 30 DEGREES
EKG P-R INTERVAL: 158 MS
EKG Q-T INTERVAL: 394 MS
EKG QRS DURATION: 78 MS
EKG QTC CALCULATION (BAZETT): 449 MS
EKG R AXIS: 22 DEGREES
EKG T AXIS: 40 DEGREES
EKG VENTRICULAR RATE: 78 BPM

## 2020-12-05 PROCEDURE — 93010 ELECTROCARDIOGRAM REPORT: CPT | Performed by: INTERNAL MEDICINE

## 2020-12-05 NOTE — ED NOTES
Discharge instructions reviewed with patient. Reviewed prescriptions with patient. No additional questions asked. Voiced understanding. Encouraged patient to follow up as discussed by the ED physician.      Anabela Cueto RN  12/04/20 2032

## 2021-02-03 NOTE — PROGRESS NOTES
[address] [facility],  [address] [place] [address]  Phone: [phone]   [facility]  Chief Complaint   Patient presents with    Follow-up     3 month f/u       SUBJECTIVE  Leah Navarrete is a 23 y.o. female who returns for follow-up of steatohepatitis. Patient reports return of spastic epigastric and right upper quadrant abdominal pain requiring an ED visit on 12/4/2020. Work-up including CBC, CMP, troponin, D-dimer, hCG, checks x-ray and EKG were unremarkable. Symptoms improved in the ED and patient was discharged to follow-up with her PCP. She has gained about 10 pounds since her last visit in 11/6/2020. She reports difficulty maintaining her diet and exercising with the ongoing Covid pandemic. Labs reviewed from 12/4/2020. Liver chemistries normal except mildly elevated ALT 46. Normal ceruloplasmin (27), normal alpha-1 antitrypsin (114), hepatitis C ab negative, hepatitiis B nonimmune. Date of last office visit and details: 11/06/2020  23 y.o. female with medical history of morbid obesity  (BMI 38) and bilateral PCOS who returns for follow-up of fatty liver. She reports a 10 pound weight loss with a change to plant-based diet. She feels more energetic in her menstrual cycles have normalized.      Date of last office visit and details: 8/10/2020  25 y.o. .female with medical history of morbid obesity and bilateral PCOS and endometriosis,  seen independently with complaints of right upper quadrant abdominal pain of 6 months duration.  Pain is sharp and spastic, radiates to the epigastrium and back. Associated with nausea, and episodic green colored loose stools of 2 days duration. Patient denies aggravation with food intake or alleviating factors.  Denies vomiting, fever, chills, unintentional weight loss, hematochezia or melenic stools. Patient reports a 15lb intentional weight loss over the past 7 months and follows with a weight loss clinic at [facility]. A review of prior workup in 03/2019 noted normal liver chemistries with the exception of mild elevation of ALT 47. US abdomen on 01/10/2020 and 07/23/2020 noted hepatomegaly with fatty liver and mild splenomegaly; no evidence of cholelithiasis or choledocholithiasis and normal biliary tree.       PAST MEDICAL HISTORY     Past Medical History:   Diagnosis Date    Anxiety     Asthma     Back pain     Depression     Headache     Ovarian torsion     PCOS (polycystic ovarian syndrome)      FAMILY HISTORY     Family History   Problem Relation Age of Onset    Substance Abuse Mother     Asthma Mother     Substance Abuse Father     Heart Attack Maternal Grandmother     Heart Disease Maternal Grandmother     High Blood Pressure Maternal Grandmother     No Known Problems Maternal Grandfather     High Blood Pressure Paternal Grandmother     Heart Disease Paternal Grandmother     Stroke Paternal Grandmother     Atrial Fibrillation Paternal Grandmother     Heart Attack Paternal Grandmother     No Known Problems Paternal Grandfather     Breast Cancer Neg Hx     Colon Cancer Neg Hx     Diabetes Neg Hx      SOCIAL HISTORY     Social History     Socioeconomic History    Marital status: Single     Spouse name: Not on file    Number of children: Not on file    Years of education: Not on file    Highest education level: Not on file   Occupational History    Not on file   Social Needs    Financial resource strain: Not on file    Food insecurity     Worry: Not on file     Inability: Not on file    Transportation needs     Medical: Not on file     Non-medical: Not on file   Tobacco Use    Smoking status: Never Smoker    Smokeless tobacco: Never Used   Substance and Sexual Activity    Alcohol use: Yes     Comment: occasional    Drug use: No    Sexual activity: Yes     Partners: Male     Comment: condoms   Lifestyle    Physical activity     Days per week: Not on file     Minutes per session: Not on file    Stress: Not on file   Relationships    Social connections     Talks on phone: Not on file     Gets together: Not on file     Attends Sikhism service: Not on file     Active member of club or organization: Not on file     Attends meetings of clubs or organizations: Not on file     Relationship status: Not on file    Intimate partner violence     Fear of current or ex partner: Not on file     Emotionally abused: Not on file     Physically abused: Not on file     Forced sexual activity: Not on file   Other Topics Concern    Not on file   Social History Narrative    Not on file     SURGICAL HISTORY     Past Surgical History:   Procedure Laterality Date    CYST REMOVAL      R [place] [address]   (This list may include medications prescribed during this encounter as epic can not insert only the list prior to this encounter.)  Current Outpatient Rx   Medication Sig Dispense Refill    tranexamic acid (LYSTEDA) 650 MG TABS tablet Take 650 mg by mouth 3 times daily       valACYclovir (VALTREX) 1 g tablet Take 1 tablet by mouth as needed      albuterol sulfate HFA (VENTOLIN HFA) 108 (90 Base) MCG/ACT inhaler Inhale 2 puffs into the lungs every 6 hours as needed for Wheezing 1 Inhaler 3    ondansetron (ZOFRAN) 4 MG tablet Take 1 tablet by mouth every 8 hours as needed for Nausea (Patient not taking: Reported on 2/8/2021) 20 tablet 0     ALLERGIES     Allergies   Allergen Reactions    No Known Allergies      IMMUNIZATIONS     Immunization History   Administered Date(s) Administered    HPV Quadrivalent (Gardasil) 11/20/2009, 01/22/2010, 06/28/2010    Influenza Vaccine, unspecified formulation 09/02/2013, 10/11/2014, 10/04/2016    Rho (D) Immune Globulin 05/25/2016    Td, unspecified formulation 11/08/2016    Tdap (Boostrix, Adacel) 12/29/2013       REVIEW OF SYSTEMS   See HPI for further details and pertinent postiives.  Negative for the following:  Constitutional: Negative for weight change. Negative for appetite change and fatigue. HENT: Negative for nosebleeds, sore throat, mouth sores, and voice change. Respiratory: Negative for cough, choking and chest tightness. Cardiovascular: Negative for chest pain   Gastrointestinal: See HPI  Musculoskeletal: Negative for arthralgias. Skin: Negative for pallor. Neurological: Negative for weakness and light-headedness. Hematological: Negative for adenopathy. Does not bruise/bleed easily. Psychiatric/Behavioral: Negative for suicidal ideas. PHYSICAL EXAM   VITAL SIGNS: BP (!) 176/86 (Site: Left Wrist)   Pulse 68   Temp 97.2 °F (36.2 °C)   Ht 5' 10\" (1.778 m)   Wt 264 lb 6.4 oz (119.9 kg)   BMI 37.94 kg/m²   Wt Readings from Last 3 Encounters:   02/08/21 264 lb 6.4 oz (119.9 kg)   12/04/20 251 lb (113.9 kg)   11/20/20 251 lb (113.9 kg)     Constitutional: Obese female, well developed, Well nourished, No acute distress, Non-toxic appearance. HENT: Normocephalic, Atraumatic, Bilateral external ears normal, Oropharynx moist, No oral exudates, Nose normal.   Eyes: Conjunctiva normal, No discharge. Neck: Normal range of motion, No tenderness, Supple, No stridor. Lymphatic: No cervical, subclavian, or axillary lymphadenopathy. Cardiovascular: Normal heart rate, Normal rhythm, No murmurs, No rubs, No gallops. Thorax & Lungs: Normal breath sounds, No respiratory distress, No wheezing, No chest tenderness. Abdomen: Pannus abdomen, normal bowel sounds, soft, non tender, non distended, no hernias   Skin: Warm, Dry, No erythema, No rash. No bruising. No spider hemangiomas. Back: No tenderness, No CVA tenderness. Lower Extremities: Intact distal pulses, No edema, No tenderness, No cyanosis, No clubbing. Neurologic: Alert & oriented x 3, Normal motor function, Normal sensory function, No focal deficits noted. No asterixis. No results found.      FINAL IMPRESSION   Nikhil Springer was seen today for follow-up. Diagnoses and all orders for this visit:    Fatty liver  -     US LESION ELASTOGRAPHY; Future  -     HEPATIC FUNCTION PANEL; Future  -     Tissue Transglutaminase Antobody IgA w Reflex; Future  -     HEPATITIS A ANTIBODY, TOTAL; Future    Discussed lifestyle modification with goal of weight loss, need for fibrosis assessment along with regular use of vit E 400 bid and regular coffee consumption may all be beneficial.  Discussed additional weight loss options bariatric surgery in additional to lifestyle interventions like healthy diet with carb control and regular exercise. Class 2 obesity due to excess calories with body mass index (BMI) of 37.0 to 37.9 in adult, unspecified whether serious comorbidity present   -     Kerri Pollack DO, Bariatric Surgery, [place]        Orders Placed This Encounter   Procedures    US LESION ELASTOGRAPHY     Standing Status:   Future     Standing Expiration Date:   2/8/2022    HEPATIC FUNCTION PANEL     Standing Status:   Future     Standing Expiration Date:   2/8/2022    Tissue Transglutaminase Antobody IgA w Reflex     Standing Status:   Future     Standing Expiration Date:   2/8/2022    HEPATITIS A ANTIBODY, TOTAL     Standing Status:   Future     Standing Expiration Date:   2/8/2022   Francina Habermann - Silva Gardner, DO, Bariatric Surgery, [facility]     Referral Priority:   Routine     Referral Type:   Eval and Treat     Referral Reason:   Specialty Services Required     Referred to Provider: Isamar Sheldon DO     Requested Specialty:   Bariatric Surgery     Number of Visits Requested:   1       ORDERED FUTURE/PENDING TESTS     Lab Frequency Next Occurrence   TISSUE TRANSGLUTAMINASE, IGG Once 11/09/2020   HEPATIC FUNCTION PANEL Once 02/09/2021       FOLLOWUP   Return in about 3 months (around 5/8/2021).           Fede Hilton 2/3/21 3:37 PM EST    CC:  ESTELLA Magallon

## 2021-02-08 ENCOUNTER — HOSPITAL ENCOUNTER (OUTPATIENT)
Age: 26
Discharge: HOME | End: 2021-02-08
Payer: COMMERCIAL

## 2021-02-08 ENCOUNTER — OFFICE VISIT (OUTPATIENT)
Dept: GASTROENTEROLOGY | Age: 26
End: 2021-02-08
Payer: COMMERCIAL

## 2021-02-08 VITALS
WEIGHT: 264.4 LBS | BODY MASS INDEX: 37.85 KG/M2 | DIASTOLIC BLOOD PRESSURE: 96 MMHG | HEART RATE: 85 BPM | TEMPERATURE: 97.2 F | HEIGHT: 70 IN | SYSTOLIC BLOOD PRESSURE: 126 MMHG

## 2021-02-08 DIAGNOSIS — K76.0 FATTY LIVER: Primary | ICD-10-CM

## 2021-02-08 DIAGNOSIS — E66.09 CLASS 2 OBESITY DUE TO EXCESS CALORIES WITH BODY MASS INDEX (BMI) OF 37.0 TO 37.9 IN ADULT, UNSPECIFIED WHETHER SERIOUS COMORBIDITY PRESENT: ICD-10-CM

## 2021-02-08 DIAGNOSIS — K76.0 FATTY LIVER: ICD-10-CM

## 2021-02-08 LAB
ALBUMIN SERPL-MCNC: 4.6 G/DL (ref 3.4–5)
ALP BLD-CCNC: 106 U/L (ref 40–129)
ALT SERPL-CCNC: 37 U/L (ref 10–40)
AST SERPL-CCNC: 31 U/L (ref 15–37)
BILIRUB SERPL-MCNC: 0.3 MG/DL (ref 0–1)
BILIRUBIN DIRECT: <0.2 MG/DL (ref 0–0.3)
BILIRUBIN, INDIRECT: NORMAL MG/DL (ref 0–1)
TOTAL PROTEIN: 7.2 G/DL (ref 6.4–8.2)

## 2021-02-08 PROCEDURE — 36415 COLL VENOUS BLD VENIPUNCTURE: CPT

## 2021-02-08 PROCEDURE — 80076 HEPATIC FUNCTION PANEL: CPT

## 2021-02-08 PROCEDURE — G8417 CALC BMI ABV UP PARAM F/U: HCPCS | Performed by: INTERNAL MEDICINE

## 2021-02-08 PROCEDURE — G8427 DOCREV CUR MEDS BY ELIG CLIN: HCPCS | Performed by: INTERNAL MEDICINE

## 2021-02-08 PROCEDURE — 86708 HEPATITIS A ANTIBODY: CPT

## 2021-02-08 PROCEDURE — G8484 FLU IMMUNIZE NO ADMIN: HCPCS | Performed by: INTERNAL MEDICINE

## 2021-02-08 PROCEDURE — 1036F TOBACCO NON-USER: CPT | Performed by: INTERNAL MEDICINE

## 2021-02-08 PROCEDURE — 99213 OFFICE O/P EST LOW 20 MIN: CPT | Performed by: INTERNAL MEDICINE

## 2021-02-08 NOTE — PATIENT INSTRUCTIONS
Patient Education        Nonalcoholic Steatohepatitis (CASTELLANOS): Care Instructions  Your Care Instructions     Nonalcoholic steatohepatitis (CASTELLANOS) is liver inflammation. It is caused by a buildup of fat in the liver. The fat buildup is not caused by drinking alcohol. Because of the inflammation, the liver does not work as well as it should. CASTELLANOS is part of a group of liver diseases called nonalcoholic fatty liver disease. In these diseases, fat builds up in the liver and sometimes causes liver damage. This damage can get worse over time. Follow-up care is a key part of your treatment and safety. Be sure to make and go to all appointments, and call your doctor if you are having problems. It's also a good idea to know your test results and keep a list of the medicines you take. How can you care for yourself at home? · Stay at a healthy weight. Or if you need to, slowly get to a healthy weight. · Control your cholesterol. Talk to your doctor about ways to lower your cholesterol, if needed. You might try getting active, taking medicines, and making healthy changes to your diet. · Eat healthy foods. This includes fruits, vegetables, lean meats and dairy, and whole grains. · If you have diabetes, keep your blood sugar at your target level. · Get at least 30 minutes of exercise on most days of the week. Walking is a good choice. You also may want to do other activities, such as running, swimming, cycling, or playing tennis or team sports. · Limit alcohol, or do not drink. Alcohol can damage the liver and cause health problems. When should you call for help? Call 911 anytime you think you may need emergency care. For example, call if:    · You have trouble breathing.     · You vomit blood or what looks like coffee grounds.    Call your doctor now or seek immediate medical care if:    · You feel very sleepy or confused.     · You have new or worse belly pain.     · You have a fever.     · There is a new or increasing yellow tint to your skin or the whites of your eyes.     · You have any abnormal bleeding, such as:  ? Nosebleeds. ? Vaginal bleeding that is different (heavier, more frequent, at a different time of the month) than what you are used to.  ? Bloody or black stools, or rectal bleeding. ? Bloody or pink urine. Watch closely for changes in your health, and be sure to contact your doctor if:    · Your belly is getting bigger.     · You are gaining weight.     · You have any problems. Where can you learn more? Go to https://CallApppeTripToucheb.BCKSTGR. org and sign in to your [employer] account. Enter S428 in the Variable box to learn more about \"Nonalcoholic Steatohepatitis (CASTELLANOS): Care Instructions. \"     If you do not have an account, please click on the \"Sign Up Now\" link. Current as of: April 15, 2020               Content Version: 12.6  © 2006-2020 [employer]. Care instructions adapted under license by [employer] [facility] ([facility]). If you have questions about a medical condition or this instruction, always ask your healthcare professional. [place] [address] any warranty or liability for your use of this information. Patient Education        Learning About Weight-Loss (Bariatric) Surgery  What is weight-loss surgery? Bariatric surgery is surgery to help you lose weight. This type of surgery is only used for people who are very overweight and have not been able to lose weight with diet and exercise. This surgery makes the stomach smaller. Some types of surgery also change the connection between your stomach and intestines. Having weight-loss surgery is a big step. After surgery, you'll need to make new, lifelong changes in how you eat and drink. How is weight-loss surgery done? Bariatric surgery may be either \"open\" or \"laparoscopic. \" Open surgery is done through a large cut (incision) in the belly. Laparoscopic surgery is done through several small cuts. The doctor puts a lighted tube, or scope, and other surgical tools through small cuts in your belly. The doctor is able to see your organs with the scope. There are different types of bariatric surgery. Gastric sleeve surgery  The surgery is usually done through several small incisions in the belly. The doctor removes more than half of your stomach. This leaves a thin sleeve, or tube, that is about the size of a banana. Because part of your stomach has been removed, this can't be reversed. Zoe-en-Y gastric bypass surgery  Zoe-en-Y (say \"conchis-en-why\") surgery changes the connection between the stomach and the intestines. The doctor separates a section of your stomach from the rest of your stomach. This makes a small pouch. The new pouch will hold the food you eat. The doctor connects the stomach pouch to the middle part of the small intestine. Gastric banding surgery  The surgery is usually done through several small incisions in the belly. The doctor wraps a band around the upper part of the stomach. This creates a small pouch. The small size of the pouch means that you will get full after you eat just a small amount of food. The doctor can inflate or deflate the band to adjust the size. This lets the doctor adjust how quickly food passes from the new pouch into the stomach. It does not change the connection between the stomach and the intestines. What can you expect after the surgery? You may stay in the hospital for one or more days after the surgery. How long you stay depends on the type of surgery you had. Most people need 2 to 4 weeks before they are ready to get back to their usual routine. For the first 2 to 6 weeks after surgery, you probably will need to follow a liquid or soft diet. Bit by bit, you will be able to eat more solid foods. Your doctor may advise you to work with a dietitian. This way you'll be sure to get enough protein, vitamins, and minerals while you are losing weight.  Even with a healthy diet, you may need to take vitamin and mineral supplements. After surgery, you will not be able to eat very much at one time. You will get full quickly. Try not to eat too much at one time or eat foods that are high in fat or sugar. If you do, you may vomit, get stomach pain, or have diarrhea. You probably will lose weight very quickly in the first few months after surgery. As time goes on, your weight loss will slow down. You will have regular doctor visits to check how you are doing. Think of bariatric surgery as a tool to help you lose weight. It isn't an instant fix. You will still need to eat a healthy diet and get regular exercise. This will help you reach your weight goal and avoid regaining the weight you lose. Follow-up care is a key part of your treatment and safety. Be sure to make and go to all appointments, and call your doctor if you are having problems. It's also a good idea to know your test results and keep a list of the medicines you take. Where can you learn more? Go to https://DIVINE BOOKSpeHacking [employer]. org and sign in to your [employer] account. Enter G469 in the ZENTICKET box to learn more about \"Learning About Weight-Loss (Bariatric) Surgery. \"     If you do not have an account, please click on the \"Sign Up Now\" link. Current as of: December 11, 2019               Content Version: 12.6  © 1821-4445 [employer]. Care instructions adapted under license by [facility] ([facility]). If you have questions about a medical condition or this instruction, always ask your healthcare professional. [address] any warranty or liability for your use of this information.

## 2021-02-10 ENCOUNTER — TELEPHONE (OUTPATIENT)
Dept: GASTROENTEROLOGY | Age: 26
End: 2021-02-10

## 2021-02-10 LAB
HAV AB SERPL IA-ACNC: NEGATIVE
MISCELLANEOUS LAB TEST ORDER: NORMAL

## 2021-02-17 NOTE — PROGRESS NOTES
PAT call placed to Pt. Pt. confirmed Fibroscan appointment scheduled on 2/18/2021 at 1430. Pt verbalized understanding of NPO after 1130. Denies any implanted devices with batteries. Denies chance of pregnancy. No further questions or concerns.

## 2021-02-18 ENCOUNTER — HOSPITAL ENCOUNTER (OUTPATIENT)
Dept: ENDOSCOPY | Age: 26
Discharge: HOME | End: 2021-02-18
Payer: COMMERCIAL

## 2021-02-18 PROCEDURE — 91200 LIVER ELASTOGRAPHY: CPT

## 2021-02-18 PROCEDURE — 91200 LIVER ELASTOGRAPHY: CPT | Performed by: INTERNAL MEDICINE

## 2021-02-25 ENCOUNTER — TELEPHONE (OUTPATIENT)
Dept: GASTROENTEROLOGY | Age: 26
End: 2021-02-25

## 2021-04-09 NOTE — TELEPHONE ENCOUNTER
Noted. Referral placed to behavioral health as requested.       Pt called stating she is having abd pain left  of belly button since friday. States it hurts to touch or bend over, no vomiting, has nausea and no appetite,  No fever, has chills.  Please advise

## 2021-05-23 ENCOUNTER — APPOINTMENT (OUTPATIENT)
Dept: GENERAL RADIOLOGY | Age: 26
End: 2021-05-23
Payer: COMMERCIAL

## 2021-05-23 ENCOUNTER — HOSPITAL ENCOUNTER (EMERGENCY)
Age: 26
Discharge: HOME | End: 2021-05-23
Attending: EMERGENCY MEDICINE
Payer: COMMERCIAL

## 2021-05-23 VITALS
TEMPERATURE: 98 F | DIASTOLIC BLOOD PRESSURE: 81 MMHG | BODY MASS INDEX: 43.65 KG/M2 | WEIGHT: 262 LBS | SYSTOLIC BLOOD PRESSURE: 130 MMHG | OXYGEN SATURATION: 99 % | HEART RATE: 88 BPM | HEIGHT: 65 IN | RESPIRATION RATE: 16 BRPM

## 2021-05-23 DIAGNOSIS — T17.928A ASPIRATION OF FOOD, INITIAL ENCOUNTER: Primary | ICD-10-CM

## 2021-05-23 PROCEDURE — 99283 EMERGENCY DEPT VISIT LOW MDM: CPT

## 2021-05-23 PROCEDURE — 71045 X-RAY EXAM CHEST 1 VIEW: CPT

## 2021-05-23 ASSESSMENT — PAIN SCALES - GENERAL: PAINLEVEL_OUTOF10: 5

## 2021-05-23 NOTE — ED PROVIDER NOTES
[facility]  ED  EMERGENCY DEPARTMENT ENCOUNTER      Pt Name: Lupe Perales  MRN: [id number]  [place] 1995  Date of evaluation: 5/23/2021  Provider: Miles Camarillo MD    CHIEF COMPLAINT       Chief Complaint   Patient presents with    Foreign Body     Patient comes into ED after feeling like she got a Malay yancey stuck in her chest.          HISTORY OF PRESENT ILLNESS   (Location/Symptom, Timing/Onset, Context/Setting, Quality, Duration, Modifying Factors, Severity)  Note limiting factors. Lupe Perales is a 27 y.o. female with past medical history of unrelated illness here today for possible food aspiration. Patient states earlier tonight she was eating Western [place] fries and thinks that one of the fries \"went down the wrong pipe\". States she could feel it go into her lungs. She started coughing immediately. She states she was coughing so hard she started vomiting. She was able to drink liquids and states that it went down easily but did not help her symptoms. She notes that while waiting in the emergency department she believes she actually coughed the [place] yancey up and now feels fine. No further shortness of breath. No chest pain at present. States she is feeling much better. No sore throat. She has been able to manage her secretions    HPI    Nursing Notes were reviewed. REVIEW OF SYSTEMS    (2-9 systems for level 4, 10 or more for level 5)     Review of Systems    Please see HPI for pertinent positive and negative review of system findings. A full 10 system ROS was performed and otherwise negative.         PAST MEDICAL HISTORY     Past Medical History:   Diagnosis Date    Anxiety     Asthma     Back pain     Depression     Fatty liver disease, nonalcoholic     Headache     Ovarian torsion     PCOS (polycystic ovarian syndrome)          SURGICAL HISTORY       Past Surgical History:   Procedure Laterality Date    CYST REMOVAL      TONSILLECTOMY AND ADENOIDECTOMY (Medical):  Lack of Transportation (Non-Medical):    Physical Activity:     Days of Exercise per Week:     Minutes of Exercise per Session:    Stress:     Feeling of Stress :    Social Connections:     Frequency of Communication with Friends and Family:     Frequency of Social Gatherings with Friends and Family:     Attends Synagogue Services:     Active Member of Clubs or Organizations:     Attends Club or Organization Meetings:     Marital Status:    Intimate Partner Violence:     Fear of Current or Ex-Partner:     Emotionally Abused:     Physically Abused:     Sexually Abused:        SCREENINGS               PHYSICAL EXAM    (up to 7 for level 4, 8 or more for level 5)     ED Triage Vitals   BP Temp Temp src Pulse Resp SpO2 Height Weight   05/23/21 0111 05/23/21 0110 -- 05/23/21 0111 05/23/21 0111 05/23/21 0111 05/23/21 0110 05/23/21 0110   (!) 123/59 98 °F (36.7 °C)  98 20 98 % 5' 5\" (1.651 m) 262 lb (118.8 kg)       Physical Exam    General appearance:  Cooperative. No acute distress. Skin:  Warm. Dry. Eye:  Extraocular movements intact. Ears, nose, mouth and throat:  Oral mucosa moist,  Neck:  Trachea midline. Heart:  Regular rate and rhythm  Perfusion:  intact  Respiratory:  Lungs clear to auscultation bilaterally. Respirations nonlabored. Abdominal:   Non distended. Nontender  Neurological:  Alert and oriented x 3. Moves all extremities spontaneously  Musculoskeletal:   Normal ROM, no deformities          Psychiatric:  Normal mood      DIAGNOSTIC RESULTS       Labs Reviewed - No data to display    Interpretation per the Radiologist below, if obtained/available at the time of this note:    XR CHEST PORTABLE   Final Result   Negative chest.             All other labs/imaging were within normal range or not returned as of this dictation.     EMERGENCY DEPARTMENT COURSE and DIFFERENTIAL DIAGNOSIS/MDM:   Vitals:    Vitals:    05/23/21 0110 05/23/21 0111 05/23/21 0405   BP:  (!) 123/59 130/81   Pulse:  98 88   Resp:  20 16   Temp: 98 °F (36.7 °C) 98 °F (36.7 °C)    SpO2:  98% 99%   Weight: 262 lb (118.8 kg)     Height: 5' 5\" (1.651 m)         Patient presents emergency department today for possible [place] yancey aspiration. Resting comfortably at present. Normal oxygen saturation. No increased work of breathing. Clear breath sounds. Chest x-ray negative. No concern for esophageal food impaction as she is tolerating her secretions and drinking freely. She feels much better. Given strict return precautions for worsening shortness of breath, cough or fever but otherwise will be discharged home    MDM    CONSULTS     None    Critical Care:   None    REASSESSMENT          PROCEDURE     Unless otherwise noted below, none     Procedures      FINAL IMPRESSION      1. Aspiration of food, initial encounter            DISPOSITION/PLAN   DISPOSITION Decision To Discharge 05/23/2021 04:34:05 AM        PATIENT REFERRED TO:  Kaylin Barnard, [address] [place] [address]  [phone]    Schedule an appointment as soon as possible for a visit         DISCHARGE MEDICATIONS:  Discharge Medication List as of 5/23/2021  4:34 AM        Controlled Substances Monitoring:     No flowsheet data found.     (Please note that portions of this note were completed with a voice recognition program.  Efforts were made to edit the dictations but occasionally words are mis-transcribed.)    Darryle Setters, MD (electronically signed)  Attending Emergency Physician            Homero Cervantes MD  05/23/21 8077

## 2021-07-13 NOTE — PROGRESS NOTES
normalized.      Date of last office visit and details: 8/10/2020  25 y.o. .female with medical history of morbid obesity and bilateral PCOS and endometriosis,  seen independently with complaints of right upper quadrant abdominal pain of 6 months duration.  Pain is sharp and spastic, radiates to the epigastrium and back. Associated with nausea, and episodic green colored loose stools of 2 days duration. Patient denies aggravation with food intake or alleviating factors. Denies vomiting, fever, chills, unintentional weight loss, hematochezia or melenic stools. Patient reports a 15lb intentional weight loss over the past 7 months and follows with a weight loss clinic at [facility].  A review of prior workup in 03/2019 noted normal liver chemistries with the exception of mild elevation of ALT 47. US abdomen on 01/10/2020 and 07/23/2020 noted hepatomegaly with fatty liver and mild splenomegaly; no evidence of cholelithiasis or choledocholithiasis and normal biliary tree.         PAST MEDICAL HISTORY     Past Medical History:   Diagnosis Date    Anxiety     Asthma     Back pain     Depression     Fatty liver disease, nonalcoholic     Headache     Ovarian torsion     PCOS (polycystic ovarian syndrome)      FAMILY HISTORY     Family History   Problem Relation Age of Onset    Substance Abuse Mother     Asthma Mother     Substance Abuse Father     Heart Attack Maternal Grandmother     Heart Disease Maternal Grandmother     High Blood Pressure Maternal Grandmother     No Known Problems Maternal Grandfather     High Blood Pressure Paternal Grandmother     Heart Disease Paternal Grandmother     Stroke Paternal Grandmother     Atrial Fibrillation Paternal Grandmother     Heart Attack Paternal Grandmother     No Known Problems Paternal Grandfather     Breast Cancer Neg Hx     Colon Cancer Neg Hx     Diabetes Neg Hx      SOCIAL HISTORY     Social History     Socioeconomic History    Marital status: Single Spouse name: Not on file    Number of children: Not on file    Years of education: Not on file    Highest education level: Not on file   Occupational History    Not on file   Tobacco Use    Smoking status: Never Smoker    Smokeless tobacco: Never Used   Vaping Use    Vaping Use: Never used   Substance and Sexual Activity    Alcohol use: Yes     Comment: occasional    Drug use: No    Sexual activity: Yes     Partners: Male     Comment: condoms   Other Topics Concern    Not on file   Social History Narrative    Not on file     Social Determinants of Health     Financial Resource Strain:     Difficulty of Paying Living Expenses:    Food Insecurity:     Worried About Running Out of Food in the Last Year:     [address] in the Last Year:    Transportation Needs:     Lack of Transportation (Medical):      Lack of Transportation (Non-Medical):    Physical Activity:     Days of Exercise per Week:     Minutes of Exercise per Session:    Stress:     Feeling of Stress :    Social Connections:     Frequency of Communication with Friends and Family:     Frequency of Social Gatherings with Friends and Family:     Attends Sabianism Services:     Active Member of Clubs or Organizations:     Attends Club or Organization Meetings:     Marital Status:    Intimate Partner Violence:     Fear of Current or Ex-Partner:     Emotionally Abused:     Physically Abused:     Sexually Abused:      SURGICAL HISTORY     Past Surgical History:   Procedure Laterality Date    CYST REMOVAL      TONSILLECTOMY AND ADENOIDECTOMY       CURRENT MEDICATIONS   (This list may include medications prescribed during this encounter as epic can not insert only the list prior to this encounter.)  Current Outpatient Rx   Medication Sig Dispense Refill    pantoprazole (PROTONIX) 40 MG tablet Take 1 tablet by mouth every morning (before breakfast) 30 tablet 5    valACYclovir (VALTREX) 1 g tablet Take 1 tablet by mouth as needed  albuterol sulfate HFA (VENTOLIN HFA) 108 (90 Base) MCG/ACT inhaler Inhale 2 puffs into the lungs every 6 hours as needed for Wheezing 1 Inhaler 3     ALLERGIES     Allergies   Allergen Reactions    No Known Allergies      IMMUNIZATIONS     Immunization History   Administered Date(s) Administered    HPV Quadrivalent (Gardasil) 11/20/2009, 01/22/2010, 06/28/2010    Influenza Vaccine, unspecified formulation 09/02/2013, 10/11/2014, 10/04/2016    Rho (D) Immune Globulin 05/25/2016    Td, unspecified formulation 11/08/2016    Tdap (Boostrix, Adacel) 12/29/2013       REVIEW OF SYSTEMS   See HPI for further details and pertinent postiives. Negative for the following:  Constitutional: Negative for weight change. Negative for appetite change and fatigue. HENT: Negative for nosebleeds, sore throat, mouth sores, and voice change. Respiratory: Negative for cough, choking and chest tightness. Cardiovascular: Negative for chest pain   Gastrointestinal: See HPI  Musculoskeletal: Negative for arthralgias. Skin: Negative for pallor. Neurological: Negative for weakness and light-headedness. Hematological: Negative for adenopathy. Does not bruise/bleed easily. Psychiatric/Behavioral: Negative for suicidal ideas. PHYSICAL EXAM   VITAL SIGNS: /84 (Site: Right Wrist, Position: Sitting, Cuff Size: Medium Adult)   Pulse 118   Ht 5' 5\" (1.651 m)   Wt 260 lb (117.9 kg)   BMI 43.27 kg/m²   Wt Readings from Last 3 Encounters:   07/14/21 260 lb (117.9 kg)   05/23/21 262 lb (118.8 kg)   02/08/21 264 lb 6.4 oz (119.9 kg)     Constitutional: Obese female, well developed, Well nourished, No acute distress, Non-toxic appearance. HENT: Normocephalic, Atraumatic, Bilateral external ears normal, Oropharynx moist, No oral exudates, Nose normal.   Eyes: Conjunctiva normal, No discharge. Neck: Normal range of motion, No tenderness, Supple, No stridor.    Cardiovascular: Normal heart rate, Normal rhythm, No murmurs, No rubs, No gallops. Thorax & Lungs: Normal breath sounds, No respiratory distress, No wheezing, No chest tenderness. Abdomen: Pannus abdomen, normal bowel sounds, soft, non tender, non distended, no hernias,    Rectal:  Deferred. Skin: Warm, Dry, No erythema, No rash. No bruising. Back: No tenderness, No CVA tenderness. Lower Extremities: Intact distal pulses, No edema, No tenderness, No cyanosis, No clubbing. Neurologic: Alert & oriented x 3, Normal motor function, Normal sensory function, No focal deficits noted. No results found. FINAL IMPRESSION   Tiny Ar was seen today for follow-up. Diagnoses and all orders for this visit:    Gastroesophageal reflux disease, unspecified whether esophagitis present  -     pantoprazole (PROTONIX) 40 MG tablet; Take 1 tablet by mouth every morning (before breakfast)  - GERD diet: avoid carbonated/acid beverages, fried and fatty foods. peppermint, chocolate, alcohol, coffee, citrus fruits and juices, tomoato products; avoid lying down for 2 to 3 hours after eating, avoid tight fitting clothing. Weight loss frequent helps heartburn/reflux especially with the presence of a hiatal hernia. -      To consider EGD with biopsy if not improving with above therapy. CASTELLANOS (nonalcoholic steatohepatitis)  -improving.  -     HEPATIC FUNCTION PANEL; Future  -    Discussed continued lifestyle modification with diet and exercise to achieve weight loss with goal BMI 25    Orders Placed This Encounter   Procedures    HEPATIC FUNCTION PANEL     Standing Status:   Future     Standing Expiration Date:   7/14/2022       ORDERED FUTURE/PENDING TESTS     Lab Frequency Next Occurrence   TISSUE TRANSGLUTAMINASE, IGG Once 11/09/2020   US LESION ELASTOGRAPHY Once 02/08/2021       FOLLOWUP   No follow-ups on file.           Davon Canchola MD 7/13/21 12:52 PM EDT    CC:  ESTELLA Albright

## 2021-07-14 ENCOUNTER — OFFICE VISIT (OUTPATIENT)
Dept: GASTROENTEROLOGY | Age: 26
End: 2021-07-14
Payer: COMMERCIAL

## 2021-07-14 VITALS
WEIGHT: 260 LBS | HEART RATE: 118 BPM | BODY MASS INDEX: 43.32 KG/M2 | DIASTOLIC BLOOD PRESSURE: 84 MMHG | HEIGHT: 65 IN | SYSTOLIC BLOOD PRESSURE: 131 MMHG

## 2021-07-14 DIAGNOSIS — K75.81 NASH (NONALCOHOLIC STEATOHEPATITIS): ICD-10-CM

## 2021-07-14 DIAGNOSIS — K21.9 GASTROESOPHAGEAL REFLUX DISEASE, UNSPECIFIED WHETHER ESOPHAGITIS PRESENT: Primary | ICD-10-CM

## 2021-07-14 PROCEDURE — G8417 CALC BMI ABV UP PARAM F/U: HCPCS | Performed by: INTERNAL MEDICINE

## 2021-07-14 PROCEDURE — 1036F TOBACCO NON-USER: CPT | Performed by: INTERNAL MEDICINE

## 2021-07-14 PROCEDURE — 99214 OFFICE O/P EST MOD 30 MIN: CPT | Performed by: INTERNAL MEDICINE

## 2021-07-14 PROCEDURE — G8427 DOCREV CUR MEDS BY ELIG CLIN: HCPCS | Performed by: INTERNAL MEDICINE

## 2021-07-14 RX ORDER — PANTOPRAZOLE SODIUM 40 MG/1
40 TABLET, DELAYED RELEASE ORAL
Qty: 30 TABLET | Refills: 5 | Status: SHIPPED | OUTPATIENT
Start: 2021-07-14

## 2021-07-14 NOTE — PATIENT INSTRUCTIONS
Patient Education        Gastroesophageal Reflux Disease (GERD): Care Instructions  Overview     Gastroesophageal reflux disease (GERD) is the backward flow of stomach acid into the esophagus. The esophagus is the tube that leads from your throat to your stomach. A one-way valve prevents the stomach acid from backing up into this tube. But when you have GERD, this valve does not close tightly enough. This can also cause pain and swelling in your esophagus. (This is called esophagitis.)  If you have mild GERD symptoms including heartburn, you may be able to control the problem with antacids or over-the-counter medicine. You can also make lifestyle changes to help reduce your symptoms. These include changing your diet and eating habits, such as not eating late at night and losing weight. Follow-up care is a key part of your treatment and safety. Be sure to make and go to all appointments, and call your doctor if you are having problems. It's also a good idea to know your test results and keep a list of the medicines you take. How can you care for yourself at home? · Take your medicines exactly as prescribed. Call your doctor if you think you are having a problem with your medicine. · Your doctor may recommend over-the-counter medicine. For mild or occasional indigestion, antacids, such as Tums, Gaviscon, Mylanta, or Maalox, may help. Your doctor also may recommend over-the-counter acid reducers, such as famotidine (Pepcid AC), cimetidine (Tagamet HB), or omeprazole (Prilosec). Read and follow all instructions on the label. If you use these medicines often, talk with your doctor. · Change your eating habits. ? It's best to eat several small meals instead of two or three large meals. ? After you eat, wait 2 to 3 hours before you lie down. ? Chocolate, mint, and alcohol can make GERD worse. ?  Spicy foods, foods that have a lot of acid (like tomatoes and oranges), and coffee can make GERD symptoms worse in some people. If your symptoms are worse after you eat a certain food, you may want to stop eating that food to see if your symptoms get better. · Do not smoke or chew tobacco. Smoking can make GERD worse. If you need help quitting, talk to your doctor about stop-smoking programs and medicines. These can increase your chances of quitting for good. · If you have GERD symptoms at night, raise the head of your bed 6 to 8 inches by putting the frame on blocks or placing a foam wedge under the head of your mattress. (Adding extra pillows does not work.)  · Do not wear tight clothing around your middle. · Lose weight if you need to. Losing just 5 to 10 pounds can help. When should you call for help? Call your doctor now or seek immediate medical care if:    · You have new or different belly pain.     · Your stools are black and tarlike or have streaks of blood. Watch closely for changes in your health, and be sure to contact your doctor if:    · Your symptoms have not improved after 2 days.     · Food seems to catch in your throat or chest.   Where can you learn more? Go to https://Homeschool Snowboarding.Sprout. org and sign in to your [employer] account. Enter Y866 in the [facility] box to learn more about \"Gastroesophageal Reflux Disease (GERD): Care Instructions. \"     If you do not have an account, please click on the \"Sign Up Now\" link. Current as of: February 10, 2021               Content Version: 12.9  © 2006-2021 [employer]. Care instructions adapted under license by [facility] ([facility]). If you have questions about a medical condition or this instruction, always ask your healthcare professional. [place] [address] any warranty or liability for your use of this information. Patient Education        Nonalcoholic Steatohepatitis (CASTELLANOS): Care Instructions  Your Care Instructions     Nonalcoholic steatohepatitis (CASTELLANOS) is liver inflammation.  It is caused by a buildup of fat in the liver. The fat buildup is not caused by drinking alcohol. Because of the inflammation, the liver does not work as well as it should. CASTELLANOS is part of a group of liver diseases called nonalcoholic fatty liver disease. In these diseases, fat builds up in the liver and sometimes causes liver damage. This damage can get worse over time. Follow-up care is a key part of your treatment and safety. Be sure to make and go to all appointments, and call your doctor if you are having problems. It's also a good idea to know your test results and keep a list of the medicines you take. How can you care for yourself at home? · Stay at a healthy weight. Or if you need to, slowly get to a healthy weight. · Control your cholesterol. Talk to your doctor about ways to lower your cholesterol, if needed. You might try getting active, taking medicines, and making healthy changes to your diet. · Eat healthy foods. This includes fruits, vegetables, lean meats and dairy, and whole grains. · If you have diabetes, keep your blood sugar at your target level. · Get at least 30 minutes of exercise on most days of the week. Walking is a good choice. You also may want to do other activities, such as running, swimming, cycling, or playing tennis or team sports. · Limit alcohol, or do not drink. Alcohol can damage the liver and cause health problems. When should you call for help? Call 911 anytime you think you may need emergency care. For example, call if:    · You have trouble breathing.     · You vomit blood or what looks like coffee grounds. Call your doctor now or seek immediate medical care if:    · You feel very sleepy or confused.     · You have new or worse belly pain.     · You have a fever.     · There is a new or increasing yellow tint to your skin or the whites of your eyes.     · You have any abnormal bleeding, such as:  ? Nosebleeds.   ? Vaginal bleeding that is different (heavier, more frequent, at a different time of the month) than what you are used to.  ? Bloody or black stools, or rectal bleeding. ? Bloody or pink urine. Watch closely for changes in your health, and be sure to contact your doctor if:    · Your belly is getting bigger.     · You are gaining weight.     · You have any problems. Where can you learn more? Go to https://Materials and [employer] Researchpedimitryeweb.[employer]Town. org and sign in to your [employer] account. Enter S769 in the [employer] box to learn more about \"Nonalcoholic Steatohepatitis (CASTELLANOS): Care Instructions. \"     If you do not have an account, please click on the \"Sign Up Now\" link. Current as of: February 10, 2021               Content Version: 12.9  © 7767-0049 [employer]. Care instructions adapted under license by [facility] ([facility]). If you have questions about a medical condition or this instruction, always ask your healthcare professional. [place] [address] any warranty or liability for your use of this information.

## 2021-11-19 ENCOUNTER — HOSPITAL ENCOUNTER (EMERGENCY)
Age: 26
Discharge: HOME | End: 2021-11-20
Attending: EMERGENCY MEDICINE
Payer: COMMERCIAL

## 2021-11-19 DIAGNOSIS — M53.3 SACROILIAC JOINT PAIN: ICD-10-CM

## 2021-11-19 DIAGNOSIS — S39.012A STRAIN OF LUMBAR REGION, INITIAL ENCOUNTER: Primary | ICD-10-CM

## 2021-11-19 LAB
BILIRUBIN URINE: NEGATIVE
BLOOD, URINE: NEGATIVE
CLARITY: CLEAR
COLOR: YELLOW
GLUCOSE URINE: NEGATIVE MG/DL
KETONES, URINE: NEGATIVE MG/DL
LEUKOCYTE ESTERASE, URINE: NEGATIVE
MICROSCOPIC EXAMINATION: NORMAL
NITRITE, URINE: NEGATIVE
PH UA: 5.5 (ref 5–8)
PROTEIN UA: NEGATIVE MG/DL
SPECIFIC GRAVITY UA: >=1.03 (ref 1–1.03)
URINE TYPE: NORMAL
UROBILINOGEN, URINE: 0.2 E.U./DL

## 2021-11-19 PROCEDURE — 96372 THER/PROPH/DIAG INJ SC/IM: CPT

## 2021-11-19 PROCEDURE — 81003 URINALYSIS AUTO W/O SCOPE: CPT

## 2021-11-19 PROCEDURE — 6360000002 HC RX W HCPCS: Performed by: PHYSICIAN ASSISTANT

## 2021-11-19 PROCEDURE — 6370000000 HC RX 637 (ALT 250 FOR IP): Performed by: PHYSICIAN ASSISTANT

## 2021-11-19 PROCEDURE — 99285 EMERGENCY DEPT VISIT HI MDM: CPT

## 2021-11-19 RX ORDER — KETOROLAC TROMETHAMINE 30 MG/ML
30 INJECTION, SOLUTION INTRAMUSCULAR; INTRAVENOUS ONCE
Status: COMPLETED | OUTPATIENT
Start: 2021-11-19 | End: 2021-11-19

## 2021-11-19 RX ORDER — IBUPROFEN 400 MG/1
400 TABLET ORAL ONCE
Status: COMPLETED | OUTPATIENT
Start: 2021-11-19 | End: 2021-11-19

## 2021-11-19 RX ORDER — LIDOCAINE 4 G/G
1 PATCH TOPICAL DAILY
Status: DISCONTINUED | OUTPATIENT
Start: 2021-11-19 | End: 2021-11-20 | Stop reason: HOSPADM

## 2021-11-19 RX ORDER — CYCLOBENZAPRINE HCL 10 MG
10 TABLET ORAL ONCE
Status: COMPLETED | OUTPATIENT
Start: 2021-11-19 | End: 2021-11-19

## 2021-11-19 RX ORDER — LIDOCAINE 4 G/G
1 PATCH TOPICAL DAILY
Status: DISCONTINUED | OUTPATIENT
Start: 2021-11-20 | End: 2021-11-19

## 2021-11-19 RX ADMIN — IBUPROFEN 400 MG: 400 TABLET, FILM COATED ORAL at 23:47

## 2021-11-19 RX ADMIN — KETOROLAC TROMETHAMINE 30 MG: 30 INJECTION, SOLUTION INTRAMUSCULAR at 22:24

## 2021-11-19 RX ADMIN — CYCLOBENZAPRINE 10 MG: 10 TABLET, FILM COATED ORAL at 22:24

## 2021-11-19 ASSESSMENT — PAIN SCALES - GENERAL
PAINLEVEL_OUTOF10: 5
PAINLEVEL_OUTOF10: 3
PAINLEVEL_OUTOF10: 5

## 2021-11-20 ENCOUNTER — APPOINTMENT (OUTPATIENT)
Dept: ULTRASOUND IMAGING | Age: 26
End: 2021-11-20
Payer: COMMERCIAL

## 2021-11-20 VITALS
SYSTOLIC BLOOD PRESSURE: 124 MMHG | TEMPERATURE: 98.4 F | HEIGHT: 65 IN | WEIGHT: 250 LBS | HEART RATE: 87 BPM | OXYGEN SATURATION: 100 % | DIASTOLIC BLOOD PRESSURE: 73 MMHG | RESPIRATION RATE: 18 BRPM | BODY MASS INDEX: 41.65 KG/M2

## 2021-11-20 PROCEDURE — 76856 US EXAM PELVIC COMPLETE: CPT

## 2021-11-20 RX ORDER — NAPROXEN 500 MG/1
500 TABLET ORAL 2 TIMES DAILY
Qty: 20 TABLET | Refills: 0 | Status: SHIPPED | OUTPATIENT
Start: 2021-11-20

## 2021-11-20 ASSESSMENT — PAIN SCALES - GENERAL: PAINLEVEL_OUTOF10: 2

## 2021-11-20 NOTE — ED NOTES
Pt ok to d/c to home. Pt given d/c instructions. Pt verbalized understating including Rx and follow up care. Pt ambulated to [facility] for ride home.  0 s/s of distress at time of d/c.          Malika Tyson RN  11/20/21 0697

## 2021-11-20 NOTE — ED PROVIDER NOTES
[facility] Emergency Department    CHIEF COMPLAINT  Back Pain (lower back pain X 2 weeks, radiating to tailbone, states she was wsa here earlier this week but did not wait because of the wait time )      SHARED SERVICE VISIT  Evaluated by CLARA. My supervising physician was available for consultation. HISTORY OF PRESENT ILLNESS  Edwin Daniels is a 27 y.o. female who presents to the ED complaining of back pain. Patient has history of fatty liver disease, PCOS, and endometriosis. She states beginning last Thursday she started having bilateral low back pain. She states the back pain has been present for approximately 2 weeks. She states initially the pain felt like it was in her pelvic bones and progressed into center low back pain. She describes in her center low back radiating to both sides and sometimes down the front of her legs. She does state that it goes into her abdomen as well. Beginning today she has had increasing pain in the right lower quadrant. She did call her OB/GYN who recommended she go to the emergency department for an ultrasound to rule out ovarian torsion due to previous history of torsion. She denies any dysuria, vaginal bleeding, change in bowels. She denies any recent fevers, chills, urinary or bowel incontinence. No history of IV drug use. She has been taking ibuprofen for pain with some significant relief. She does not have a history of kidney stone. Currently she rates her pain as 6/10 and does appear uncomfortable upon interview. No other complaints, modifying factors or associated symptoms. Nursing notes reviewed.    Past Medical History:   Diagnosis Date    Anxiety     Asthma     Back pain     Depression     Fatty liver disease, nonalcoholic     Headache     Ovarian torsion     PCOS (polycystic ovarian syndrome)      Past Surgical History:   Procedure Laterality Date    CYST REMOVAL      TONSILLECTOMY AND ADENOIDECTOMY       Family History   Problem Relation Age of Onset    Substance Abuse Mother     Asthma Mother     Substance Abuse Father     Heart Attack Maternal Grandmother     Heart Disease Maternal Grandmother     High Blood Pressure Maternal Grandmother     No Known Problems Maternal Grandfather     High Blood Pressure Paternal Grandmother     Heart Disease Paternal Grandmother     Stroke Paternal Grandmother     Atrial Fibrillation Paternal Grandmother     Heart Attack Paternal Grandmother     No Known Problems Paternal Grandfather     Breast Cancer Neg Hx     Colon Cancer Neg Hx     Diabetes Neg Hx      Social History     Socioeconomic History    Marital status: Single     Spouse name: Not on file    Number of children: Not on file    Years of education: Not on file    Highest education level: Not on file   Occupational History    Not on file   Tobacco Use    Smoking status: Never Smoker    Smokeless tobacco: Never Used   Vaping Use    Vaping Use: Never used   Substance and Sexual Activity    Alcohol use: Yes     Comment: occasional    Drug use: No    Sexual activity: Yes     Partners: Male     Comment: condoms   Other Topics Concern    Not on file   Social History Narrative    Not on file     Social Determinants of Health     Financial Resource Strain:     Difficulty of Paying Living Expenses: Not on file   Food Insecurity:     Worried About Running Out of Food in the Last Year: Not on file    Frank of Food in the Last Year: Not on file   Transportation Needs:     Lack of Transportation (Medical): Not on file    Lack of Transportation (Non-Medical):  Not on file   Physical Activity:     Days of Exercise per Week: Not on file    Minutes of Exercise per Session: Not on file   Stress:     Feeling of Stress : Not on file   Social Connections:     Frequency of Communication with Friends and Family: Not on file    Frequency of Social Gatherings with Friends and Family: Not on file    Attends Mandaen Services: Not on file    Active Member of Clubs or Organizations: Not on file    Attends Club or Organization Meetings: Not on file    Marital Status: Not on file   Intimate Partner Violence:     Fear of Current or Ex-Partner: Not on file    Emotionally Abused: Not on file    Physically Abused: Not on file    Sexually Abused: Not on file   Housing Stability:     Unable to Pay for Housing in the Last Year: Not on file    Number of [place] in the Last Year: Not on file    Unstable Housing in the Last Year: Not on file     No current facility-administered medications for this encounter. Current Outpatient Medications   Medication Sig Dispense Refill    pantoprazole (PROTONIX) 40 MG tablet Take 1 tablet by mouth every morning (before breakfast) 30 tablet 5    valACYclovir (VALTREX) 1 g tablet Take 1 tablet by mouth as needed      albuterol sulfate HFA (VENTOLIN HFA) 108 (90 Base) MCG/ACT inhaler Inhale 2 puffs into the lungs every 6 hours as needed for Wheezing 1 Inhaler 3     Allergies   Allergen Reactions    No Known Allergies        REVIEW OF SYSTEMS  6 systems reviewed, pertinent positives per HPI otherwise noted to be negative    PHYSICAL EXAM  /87   Pulse 93   Temp 98.4 °F (36.9 °C) (Oral)   Resp 16   Ht 5' 5\" (1.651 m)   Wt 250 lb (113.4 kg)   SpO2 98%   BMI 41.60 kg/m²   GENERAL APPEARANCE: Awake and alert. Cooperative. No acute distress. HEAD: Normocephalic. Atraumatic. EYES: PERRL. EOM's grossly intact. ENT: Mucous membranes are moist.   NECK: Supple. Normal ROM. CHEST: Equal symmetric chest rise. LUNGS: Breathing is unlabored. Speaking comfortably in full sentences. Abdomen: Nondistended and nontender. No rigidity, guarding, rebound. No midline pulsatile mass. BACK: On exam of cervical, thoracic, lumbar spine, there is no swelling, bruising, or color change noted. There is no midline bony tenderness, without crepitus, deformity, or step off.   Patient Motrin for pain. At the time of of my shift ending the patient had not received her ultrasound therefore I did send the patient out to attending physician to follow-up with. I do not anticipate an unremarkable ultrasound and anticipate discharging the patient home with symptomatic treatment for sciatic pain and close follow-up. I did discuss this tentative plan with the patient, all questions are answered and she is agreeable to the plan pending ultrasound results. 0100am: I discussed the history, physical, and treatment plan with Dr. Gilman Blood was signed out in stable condition. Please see Dr. Benito Shaw note for further details, including diagnosis and disposition. MDM  Results for orders placed or performed during the hospital encounter of 11/19/21   Urinalysis, reflex to microscopic   Result Value Ref Range    Color, UA Yellow Straw/Yellow    Clarity, UA Clear Clear    Glucose, Ur Negative Negative mg/dL    Bilirubin Urine Negative Negative    Ketones, Urine Negative Negative mg/dL    Specific Gravity, UA >=1.030 1.005 - 1.030    Blood, Urine Negative Negative    pH, UA 5.5 5.0 - 8.0    Protein, UA Negative Negative mg/dL    Urobilinogen, Urine 0.2 <2.0 E.U./dL    Nitrite, Urine Negative Negative    Leukocyte Esterase, Urine Negative Negative    Microscopic Examination Not Indicated     Urine Type NotGiven        I estimate there is LOW risk for ABDOMINAL AORTIC ANEURYSM, CAUDA EQUINA SYNDROME, EPIDURAL MASS LESION, SPINAL STENOSIS, OR HERNIATED DISK CAUSING SEVERE STENOSIS, thus I consider the discharge disposition reasonable. Cindy Lau and I have discussed the diagnosis and risks, and we agree with discharging home to follow-up with their primary doctor. We also discussed returning to the Emergency Department immediately if new or worsening symptoms occur.  We have discussed the symptoms which are most concerning (e.g., saddle anesthesia, urinary or bowel incontinence or retention, changing or worsening pain) that necessitate immediate return. Final Impression    1. Strain of lumbar region, initial encounter    2. Sacroiliac joint pain        Blood pressure 124/73, pulse 87, temperature 98.4 °F (36.9 °C), temperature source Oral, resp. rate 18, height 5' 5\" (1.651 m), weight 250 lb (113.4 kg), SpO2 100 %, not currently breastfeeding. DISPOSITION  Patient was discharged to home in good condition.           Bola Goyal  11/20/21 4698

## 2021-11-20 NOTE — ED PROVIDER NOTES
I independently performed a history and physical on [facility]. All diagnostic, treatment, and disposition decisions were made by myself in conjunction with the advanced practice provider. For further details of [facility] emergency department encounter, please see Etta Baumgarten , PA's documentation. Patient presents to the emergency department complaining of left lower back pain. Patient reports the pain has been persistent for the last 2 weeks but that she has noted that the pain wraps around towards the groin. Patient contacted her OB/GYN and was directed to the emergency department for pelvic ultrasound secondary to concern for ovarian torsion. Physical exam: General: No acute distress. Spine: No midline tenderness of cervical, thoracic, or lumbar spine. Left sacroiliac tenderness that reproduces pain to palpation. Heart: Regular rate and rhythm. Normal S1-S2. Lungs: Auscultation bilaterally. No wheezes, rales, rhonchi. Abdomen: Soft. Nontender. Nondistended. Assessment/plan:   1. Strain of lumbar region, initial encounter    2.  Sacroiliac joint pain           Pamethan Fears, DO  11/20/21 2490

## 2021-12-07 DIAGNOSIS — K21.9 GASTROESOPHAGEAL REFLUX DISEASE, UNSPECIFIED WHETHER ESOPHAGITIS PRESENT: ICD-10-CM

## 2021-12-07 LAB
ALBUMIN SERPL-MCNC: 4.8 G/DL (ref 3.4–5)
ALP BLD-CCNC: 107 U/L (ref 40–129)
ALT SERPL-CCNC: 34 U/L (ref 10–40)
AST SERPL-CCNC: 25 U/L (ref 15–37)
BILIRUB SERPL-MCNC: 0.4 MG/DL (ref 0–1)
BILIRUBIN DIRECT: <0.2 MG/DL (ref 0–0.3)
BILIRUBIN, INDIRECT: NORMAL MG/DL (ref 0–1)
TOTAL PROTEIN: 7.4 G/DL (ref 6.4–8.2)

## 2022-03-28 ENCOUNTER — HOSPITAL ENCOUNTER (OUTPATIENT)
Dept: GENERAL RADIOLOGY | Age: 27
Discharge: HOME | End: 2022-03-28
Payer: COMMERCIAL

## 2022-03-28 DIAGNOSIS — M79.644 THUMB PAIN, RIGHT: ICD-10-CM

## 2022-03-28 PROCEDURE — 73140 X-RAY EXAM OF FINGER(S): CPT

## 2022-05-10 ENCOUNTER — HOSPITAL ENCOUNTER (EMERGENCY)
Age: 27
Discharge: HOME | End: 2022-05-10
Attending: EMERGENCY MEDICINE
Payer: COMMERCIAL

## 2022-05-10 VITALS
TEMPERATURE: 98.4 F | SYSTOLIC BLOOD PRESSURE: 141 MMHG | BODY MASS INDEX: 44.15 KG/M2 | HEIGHT: 65 IN | WEIGHT: 265 LBS | HEART RATE: 99 BPM | DIASTOLIC BLOOD PRESSURE: 92 MMHG | OXYGEN SATURATION: 96 % | RESPIRATION RATE: 20 BRPM

## 2022-05-10 DIAGNOSIS — R42 DIZZINESS: Primary | ICD-10-CM

## 2022-05-10 LAB
A/G RATIO: 2.2 (ref 1.1–2.2)
ALBUMIN SERPL-MCNC: 4.9 G/DL (ref 3.4–5)
ALP BLD-CCNC: 108 U/L (ref 40–129)
ALT SERPL-CCNC: 39 U/L (ref 10–40)
ANION GAP SERPL CALCULATED.3IONS-SCNC: 15 MMOL/L (ref 3–16)
AST SERPL-CCNC: 30 U/L (ref 15–37)
BASOPHILS ABSOLUTE: 0.2 K/UL (ref 0–0.2)
BASOPHILS RELATIVE PERCENT: 1.4 %
BILIRUB SERPL-MCNC: <0.2 MG/DL (ref 0–1)
BUN BLDV-MCNC: 13 MG/DL (ref 7–20)
CALCIUM SERPL-MCNC: 9.4 MG/DL (ref 8.3–10.6)
CHLORIDE BLD-SCNC: 100 MMOL/L (ref 99–110)
CO2: 23 MMOL/L (ref 21–32)
CREAT SERPL-MCNC: 0.8 MG/DL (ref 0.6–1.1)
EOSINOPHILS ABSOLUTE: 0.4 K/UL (ref 0–0.6)
EOSINOPHILS RELATIVE PERCENT: 3.7 %
GFR AFRICAN AMERICAN: >60
GFR NON-AFRICAN AMERICAN: >60
GLUCOSE BLD-MCNC: 101 MG/DL (ref 70–99)
HCG QUALITATIVE: NEGATIVE
HCG(URINE) PREGNANCY TEST: NEGATIVE
HCT VFR BLD CALC: 40.1 % (ref 36–48)
HEMOGLOBIN: 13.6 G/DL (ref 12–16)
LYMPHOCYTES ABSOLUTE: 2.2 K/UL (ref 1–5.1)
LYMPHOCYTES RELATIVE PERCENT: 19.6 %
MCH RBC QN AUTO: 29.4 PG (ref 26–34)
MCHC RBC AUTO-ENTMCNC: 33.9 G/DL (ref 31–36)
MCV RBC AUTO: 86.6 FL (ref 80–100)
MONOCYTES ABSOLUTE: 0.7 K/UL (ref 0–1.3)
MONOCYTES RELATIVE PERCENT: 6.1 %
NEUTROPHILS ABSOLUTE: 7.8 K/UL (ref 1.7–7.7)
NEUTROPHILS RELATIVE PERCENT: 69.2 %
PDW BLD-RTO: 12.9 % (ref 12.4–15.4)
PLATELET # BLD: 194 K/UL (ref 135–450)
PMV BLD AUTO: 9 FL (ref 5–10.5)
POTASSIUM REFLEX MAGNESIUM: 3.8 MMOL/L (ref 3.5–5.1)
RBC # BLD: 4.63 M/UL (ref 4–5.2)
SODIUM BLD-SCNC: 138 MMOL/L (ref 136–145)
TOTAL PROTEIN: 7.1 G/DL (ref 6.4–8.2)
WBC # BLD: 11.3 K/UL (ref 4–11)

## 2022-05-10 PROCEDURE — 80053 COMPREHEN METABOLIC PANEL: CPT

## 2022-05-10 PROCEDURE — 99283 EMERGENCY DEPT VISIT LOW MDM: CPT

## 2022-05-10 PROCEDURE — 6370000000 HC RX 637 (ALT 250 FOR IP): Performed by: EMERGENCY MEDICINE

## 2022-05-10 PROCEDURE — 85025 COMPLETE CBC W/AUTO DIFF WBC: CPT

## 2022-05-10 PROCEDURE — 84703 CHORIONIC GONADOTROPIN ASSAY: CPT

## 2022-05-10 RX ORDER — ACETAMINOPHEN 500 MG
1000 TABLET ORAL ONCE
Status: COMPLETED | OUTPATIENT
Start: 2022-05-10 | End: 2022-05-10

## 2022-05-10 RX ORDER — PHENAZOPYRIDINE HYDROCHLORIDE 100 MG/1
TABLET, FILM COATED ORAL
COMMUNITY
Start: 2022-05-09 | End: 2022-05-10 | Stop reason: SINTOL

## 2022-05-10 RX ORDER — NITROFURANTOIN 25; 75 MG/1; MG/1
CAPSULE ORAL
COMMUNITY
Start: 2022-05-09 | End: 2022-08-04

## 2022-05-10 RX ADMIN — ACETAMINOPHEN 1000 MG: 500 TABLET ORAL at 22:18

## 2022-05-10 ASSESSMENT — ENCOUNTER SYMPTOMS
RHINORRHEA: 0
BACK PAIN: 1
ABDOMINAL PAIN: 1
EYE ITCHING: 0
SHORTNESS OF BREATH: 0

## 2022-05-10 ASSESSMENT — PAIN DESCRIPTION - LOCATION
LOCATION: BACK
LOCATION: BACK

## 2022-05-10 ASSESSMENT — PAIN SCALES - GENERAL
PAINLEVEL_OUTOF10: 2
PAINLEVEL_OUTOF10: 2

## 2022-05-10 ASSESSMENT — VISUAL ACUITY
OU: 20/30
OS: 20/30
OD: 20/30

## 2022-05-10 ASSESSMENT — PAIN DESCRIPTION - DESCRIPTORS: DESCRIPTORS: CRAMPING

## 2022-05-10 ASSESSMENT — PAIN DESCRIPTION - ORIENTATION
ORIENTATION: RIGHT
ORIENTATION: LOWER

## 2022-05-10 ASSESSMENT — PAIN - FUNCTIONAL ASSESSMENT: PAIN_FUNCTIONAL_ASSESSMENT: 0-10

## 2022-05-11 NOTE — ED NOTES
Ambulated pt approx 60 ft w/o assistance. Pt's gait even and steady. When asked, denies any dizziness or lightheadedness.       Queenie Greenfield  05/10/22 [id number]

## 2022-05-11 NOTE — ED PROVIDER NOTES
[address] [facility]  ED  EMERGENCY DEPARTMENT ENCOUNTER      Pt Name: Kristal Cartwright  MRN: [id number]  [place] 1995  Date of evaluation: 5/10/2022  Provider: Cassidy Mensah MD    [address] [place] [address]       Chief Complaint   Patient presents with    Dizziness     started around 12:00 today; pt ambulatory and steady gait at triage;    Medication Reaction     \"I think I may be having a weird reaction to Macrobid\"; \"pharmacist and PCP told me to come to ER\"    Back Pain     lower back pain 2/10         HISTORY OF PRESENT ILLNESS   (Location/Symptom, Timing/Onset,Context/Setting, Quality, Duration, Modifying Factors, Severity)  Note limiting factors. Kristal Cartwright is a 24 y.o. female who presents to the emergency department sent in from PCP. Has been having extreme dizziness and blurred vision with throbbing into her head and neck. Was checked for extreme for sudden abdominal pain and was evaluated for ovarian cysts. Nothing was found. Was told possibly a kidney stone or UTI. She went to urgent care later on and was told that she has cystitis. Her sxs started at about 1pm. Nausea for a couple days. No vomiting. Has a h/o  Migraines and has had associated dizziness but never this severe. She has not had blurry vision with her headache in the past. The episodes lasts about 10-15 seconds but she has had several episodes throughout the day. Started while she was laughing. Has taken 3 doses marcobid and 1 dose of pyridium. Nursing notes were reviewed. REVIEW OF SYSTEMS    (2-9 systems for level 4, 10 or more for level 5)     Review of Systems   Constitutional: Negative for fever. HENT: Negative for rhinorrhea and sneezing. Eyes: Negative for itching. Respiratory: Negative for shortness of breath. Cardiovascular: Negative for chest pain. Gastrointestinal: Positive for abdominal pain. Crampy   Genitourinary: Positive for frequency. Negative for flank pain.    Musculoskeletal: Positive for back pain. Skin: Negative for rash. Neurological: Positive for headaches. Hematological: Does not bruise/bleed easily. PAST MEDICAL HISTORY     Past Medical History:   Diagnosis Date    Anxiety     Asthma     Back pain     Depression     Fatty liver disease, nonalcoholic     Headache     Ovarian torsion     PCOS (polycystic ovarian syndrome)          SURGICALHISTORY       Past Surgical History:   Procedure Laterality Date    CYST REMOVAL      TONSILLECTOMY AND ADENOIDECTOMY           CURRENT MEDICATIONS       Discharge Medication List as of 5/10/2022 10:13 PM      CONTINUE these medications which have NOT CHANGED    Details   nitrofurantoin, macrocrystal-monohydrate, (MACROBID) 100 MG capsule TAKE 1 CAPSULE BY MOUTH TWO TIMES A DAY FOR 5 DAYSHistorical Med      naproxen (NAPROSYN) 500 MG tablet Take 1 tablet by mouth 2 times daily, Disp-20 tablet, R-0Normal      pantoprazole (PROTONIX) 40 MG tablet Take 1 tablet by mouth every morning (before breakfast), Disp-30 tablet, R-5Normal      valACYclovir (VALTREX) 1 g tablet Take 1 tablet by mouth as neededHistorical Med      albuterol sulfate HFA (VENTOLIN HFA) 108 (90 Base) MCG/ACT inhaler Inhale 2 puffs into the lungs every 6 hours as needed for Wheezing, Disp-1 Inhaler, R-3Normal             ALLERGIES     Patient has no known allergies.     FAMILY HISTORY       Family History   Problem Relation Age of Onset    Substance Abuse Mother     Asthma Mother     Substance Abuse Father     Heart Attack Maternal Grandmother     Heart Disease Maternal Grandmother     High Blood Pressure Maternal Grandmother     No Known Problems Maternal Grandfather     High Blood Pressure Paternal Grandmother     Heart Disease Paternal Grandmother     Stroke Paternal Grandmother     Atrial Fibrillation Paternal Grandmother     Heart Attack Paternal Grandmother     No Known Problems Paternal Grandfather     Breast Cancer Neg Hx     Colon Cancer Neg Hx     Diabetes Neg Hx           SOCIAL HISTORY       Social History     Socioeconomic History    Marital status: Single     Spouse name: None    Number of children: None    Years of education: None    Highest education level: None   Occupational History    None   Tobacco Use    Smoking status: Never Smoker    Smokeless tobacco: Never Used   Vaping Use    Vaping Use: Never used   Substance and Sexual Activity    Alcohol use: Yes     Comment: occasional    Drug use: No    Sexual activity: Yes     Partners: Male     Comment: condoms   Other Topics Concern    None   Social History Narrative    None     Social Determinants of Health     Financial Resource Strain:     Difficulty of Paying Living Expenses: Not on file   Food Insecurity:     Worried About Running Out of Food in the Last Year: Not on file    Frank of Food in the Last Year: Not on file   Transportation Needs:     Lack of Transportation (Medical): Not on file    Lack of Transportation (Non-Medical):  Not on file   Physical Activity:     Days of Exercise per Week: Not on file    Minutes of Exercise per Session: Not on file   Stress:     Feeling of Stress : Not on file   Social Connections:     Frequency of Communication with Friends and Family: Not on file    Frequency of Social Gatherings with Friends and Family: Not on file    Attends Rastafari Services: Not on file    Active Member of [address] [place] [address] [employer] or Organizations: Not on file    Attends Club or Organization Meetings: Not on file    Marital Status: Not on file   Intimate Partner Violence:     Fear of Current or Ex-Partner: Not on file    Emotionally Abused: Not on file    Physically Abused: Not on file    Sexually Abused: Not on file   Housing Stability:     Unable to Pay for Housing in the Last Year: Not on file    Number of [place] in the Last Year: Not on file    Unstable Housing in the Last Year: Not on file       SCREENINGS   NIH Stroke Scale  Interval: Baseline  Level of Consciousness (1a): Alert  LOC Questions (1b): Answers both correctly  LOC Commands (1c): Performs both tasks correctly  Best Gaze (2): Normal  Visual (3): No visual loss  Facial Palsy (4): Normal symmetrical movement  Motor Arm, Left (5a): No drift  Motor Arm, Right (5b): No drift  Motor Leg, Left (6a): No drift  Motor Leg, Right (6b): No drift  Limb Ataxia (7): Absent  Sensory (8): Normal  Best Language (9): No aphasia  Dysarthria (10): Normal  Extinction and Inattention (11): No abnormality  Total: 0[place] Coma Scale  Eye Opening: Spontaneous  Best Verbal Response: Oriented  Best Motor Response: Obeys commands  [place] Coma Scale Score: 15        PHYSICAL EXAM    (up to 7 for level 4, 8 or more for level 5)     ED Triage Vitals [05/10/22 2004]   BP Temp Temp Source Pulse Resp SpO2 Height Weight   (!) 115/94 98.4 °F (36.9 °C) Oral 100 22 100 % 5' 5\" (1.651 m) 265 lb (120.2 kg)       Physical Exam  Vitals and nursing note reviewed. Constitutional:       Appearance: Normal appearance. She is well-developed. She is not ill-appearing. HENT:      Head: Normocephalic and atraumatic. Right Ear: External ear normal.      Left Ear: External ear normal.      Nose: Nose normal.   Eyes:      General: No scleral icterus. Right eye: No discharge. Left eye: No discharge. Conjunctiva/sclera: Conjunctivae normal.   Cardiovascular:      Rate and Rhythm: Normal rate and regular rhythm. Heart sounds: Normal heart sounds. Pulmonary:      Effort: Pulmonary effort is normal. No respiratory distress. Breath sounds: Normal breath sounds. No wheezing or rales. Abdominal:      General: Bowel sounds are normal. There is no distension. Palpations: Abdomen is soft. Tenderness: There is no abdominal tenderness. There is no guarding or rebound. Musculoskeletal:      Cervical back: Neck supple. Skin:     Coloration: Skin is not pale. Neurological:      Mental Status: She is alert. Comments: NIH 0, GCS 15.    Psychiatric:         Mood and Affect: Mood normal.         Behavior: Behavior normal.             DIAGNOSTIC RESULTS     EKG: All EKG's are interpreted by the Emergency Department Physician who either signs or Co-signs this chart in the absence of a cardiologist.    12 lead EKG shows     RADIOLOGY:   Non-plain film images such as CT, Ultrasound and MRI are read by the radiologist. Plain radiographic images are visualized and preliminarily interpreted by the emergency physician with the below findings:        Interpretation per the Radiologist below, if available at the time of this note:    No orders to display         ED BEDSIDE ULTRASOUND:   Performed by ED Physician - none    LABS:  Labs Reviewed   CBC WITH AUTO DIFFERENTIAL - Abnormal; Notable for the following components:       Result Value    WBC 11.3 (*)     Neutrophils Absolute 7.8 (*)     All other components within normal limits   COMPREHENSIVE METABOLIC PANEL W/ REFLEX TO MG FOR LOW K - Abnormal; Notable for the following components:    Glucose 101 (*)     All other components within normal limits   HCG, SERUM, QUALITATIVE   PREGNANCY, URINE       All other labs were within normal range or not returned as of this dictation. EMERGENCY DEPARTMENT COURSE and DIFFERENTIAL DIAGNOSIS/MDM:   Vitals:    Vitals:    05/10/22 2004 05/10/22 2142   BP: (!) 115/94 (!) 141/92   Pulse: 100 99   Resp: 22 20   Temp: 98.4 °F (36.9 °C)    TempSrc: Oral    SpO2: 100% 96%   Weight: 265 lb (120.2 kg)    Height: 5' 5\" (1.651 m)                ED Course as of 05/11/22 0040   Tue May 10, 2022   [age] Adult female who comes in with dizziness and blurry vision. Physical exam is unremarkable. Her symptoms are not present at this time. She is also having associated headache. Laboratory studies ordered to evaluate for any complications of Pyridium including hemolytic anemia which may be causing her symptoms.   NIH stroke scale 0, she will be ambulated and vision acuity performed. Once these are done if normal no imaging studies would be performed. [TD]      ED Course User Index  [TD] Linh Correa MD     Normal visual acuity normal ambulation. Laboratory studies unremarkable. Pyridium can cause dizziness and headache and up to 10% of patients. If considering she had this medication in her physical exam is normal here I believe that she is low risk for serious or life-threatening condition I recommend close follow-up in the outpatient setting. Patient encouraged to discontinue the Pyridium. CRITICAL CARE TIME   None       CONSULTS:  None    PROCEDURES:       Procedures    FINAL IMPRESSION      1.  Dizziness          DISPOSITION/PLAN   DISPOSITION Decision To Discharge 05/10/2022 10:07:24 PM      PATIENT REFERREDTO:  Alexis Mckeon, [address] [place] [address] [facility]  [phone]    Schedule an appointment as soon as possible for a visit       [facility] [address] [phone]    If symptoms worsen      DISCHARGEMEDICATIONS:  Discharge Medication List as of 5/10/2022 10:13 PM             (Please note that portions of this note were completed with a voice recognition program.  Efforts were made to edit the dictations but occasionally words are mis-transcribed.)    Linh Correa MD (electronically signed)  Attending Emergency Physician          Linh Correa MD  05/11/22 0424

## 2022-07-29 ENCOUNTER — HOSPITAL ENCOUNTER (EMERGENCY)
Age: 27
Discharge: HOME | End: 2022-07-29
Payer: COMMERCIAL

## 2022-07-29 VITALS
RESPIRATION RATE: 14 BRPM | DIASTOLIC BLOOD PRESSURE: 99 MMHG | HEIGHT: 65 IN | SYSTOLIC BLOOD PRESSURE: 144 MMHG | OXYGEN SATURATION: 98 % | HEART RATE: 99 BPM | WEIGHT: 250 LBS | TEMPERATURE: 98 F | BODY MASS INDEX: 41.65 KG/M2

## 2022-07-29 DIAGNOSIS — J02.9 ACUTE PHARYNGITIS, UNSPECIFIED ETIOLOGY: Primary | ICD-10-CM

## 2022-07-29 PROCEDURE — 99282 EMERGENCY DEPT VISIT SF MDM: CPT

## 2022-07-29 ASSESSMENT — PAIN - FUNCTIONAL ASSESSMENT: PAIN_FUNCTIONAL_ASSESSMENT: 0-10

## 2022-07-29 ASSESSMENT — PAIN DESCRIPTION - LOCATION: LOCATION: THROAT

## 2022-07-29 ASSESSMENT — PAIN DESCRIPTION - DESCRIPTORS: DESCRIPTORS: SORE

## 2022-07-29 ASSESSMENT — PAIN SCALES - GENERAL: PAINLEVEL_OUTOF10: 4

## 2022-07-31 NOTE — ED PROVIDER NOTES
[facility]  ED  EMERGENCY DEPARTMENT ENCOUNTER      This patient was not seen and evaluated by the attending physician. Pt Name: Abigail Dean  MRN: [id number]  [place] 1995  Date of evaluation: 7/29/2022  Provider: TRINI Valadez - CNP-C  PCP: ESTELLA Kimbrough      History provided by the patient    CHIEFCOMPLAINT:     Chief Complaint   Patient presents with    Pharyngitis     Pt has been treated for strep for 2 weeks, not better, she has either called or been to [facility] more than 4 times and they told her they would not see her again for this again       HISTORY OF PRESENT ILLNESS:      Abigail Dean is a 24 y.o. female who presents to [facility]  ED with complaints of sore throat. Patient states that for the last several weeks she has been being treated for strep throat, states that she is been on several different rounds of several different antibiotics since then, states that she keeps going to the urgent care and they keep putting her on different antibiotics, she states that she now again has a sore throat. She denies fever, she has no other injuries or complaints. She is here for further evaluation. LOCATION:throat  QUALITY:ache  SEVERITY:4  DURATION:today  MODIFYING FACTORS:none noted    Nursing Notes were reviewed     REVIEW OF SYSTEMS:     Review of Systems  All systems, a total of 10, are reviewed and negative except for those that were just noted in history present illness.         PAST MEDICAL HISTORY:     Past Medical History:   Diagnosis Date    Anxiety     Asthma     Back pain     Depression     Fatty liver disease, nonalcoholic     Headache     Ovarian torsion     PCOS (polycystic ovarian syndrome)          SURGICAL HISTORY:      Past Surgical History:   Procedure Laterality Date    CYST REMOVAL      TONSILLECTOMY AND ADENOIDECTOMY           CURRENT MEDICATIONS:       Discharge Medication List as of 7/29/2022 11:40 PM CONTINUE these medications which have NOT CHANGED    Details   nitrofurantoin, macrocrystal-monohydrate, (MACROBID) 100 MG capsule TAKE 1 CAPSULE BY MOUTH TWO TIMES A DAY FOR 5 DAYSHistorical Med      naproxen (NAPROSYN) 500 MG tablet Take 1 tablet by mouth 2 times daily, Disp-20 tablet, R-0Normal      pantoprazole (PROTONIX) 40 MG tablet Take 1 tablet by mouth every morning (before breakfast), Disp-30 tablet, R-5Normal      valACYclovir (VALTREX) 1 g tablet Take 1 tablet by mouth as neededHistorical Med      albuterol sulfate HFA (VENTOLIN HFA) 108 (90 Base) MCG/ACT inhaler Inhale 2 puffs into the lungs every 6 hours as needed for Wheezing, Disp-1 Inhaler, R-3Normal               ALLERGIES:    Azo bladder control-go-less    FAMILY HISTORY:       Family History   Problem Relation Age of Onset    Substance Abuse Mother     Asthma Mother     Substance Abuse Father     Heart Attack Maternal Grandmother     Heart Disease Maternal Grandmother     High Blood Pressure Maternal Grandmother     No Known Problems Maternal Grandfather     High Blood Pressure Paternal Grandmother     Heart Disease Paternal Grandmother     Stroke Paternal Grandmother     Atrial Fibrillation Paternal Grandmother     Heart Attack Paternal Grandmother     No Known Problems Paternal Grandfather     Breast Cancer Neg Hx     Colon Cancer Neg Hx     Diabetes Neg Hx           SOCIAL HISTORY:     Social History     Socioeconomic History    Marital status: Single     Spouse name: None    Number of children: None    Years of education: None    Highest education level: None   Tobacco Use    Smoking status: Never    Smokeless tobacco: Never   Vaping Use    Vaping Use: Never used   Substance and Sexual Activity    Alcohol use: Yes     Comment: occasional    Drug use: No    Sexual activity: Yes     Partners: Male     Comment: condoms       SCREENINGS:    [place] Coma Scale  Eye Opening: Spontaneous  Best Verbal Response: Oriented  Best Motor Response: Obeys commands  [place] Coma Scale Score: 15        PHYSICAL EXAM:       ED Triage Vitals [07/29/22 2311]   BP Temp Temp Source Heart Rate Resp SpO2 Height Weight   (!) 144/99 98 °F (36.7 °C) Temporal 99 14 98 % 5' 5\" (1.651 m) 250 lb (113.4 kg)       Physical Exam    CONSTITUTIONAL: Awake and alert. Cooperative. Well-developed. Well-nourished. Vitals:    07/29/22 2311   BP: (!) 144/99   Pulse: 99   Resp: 14   Temp: 98 °F (36.7 °C)   TempSrc: Temporal   SpO2: 98%   Weight: 250 lb (113.4 kg)   Height: 5' 5\" (1.651 m)     HENT: Normocephalic. Atraumatic. External ears normal, without discharge. Nonasal discharge. Mucous membranes moist. Oropharynx is not erythemetous  EYES: Conjunctiva non-injected, no lid abnormalities noted. No scleral icterus. EOM's grossly intact. Anterior chambers clear. NECK: Supple. Normal ROM. No meningismus. CARDIOVASCULAR: no tachycardia per vital signs. PULMONARY/CHEST WALL: Effort normal. No tachypnea. No audible adventitious breath sounds. ABDOMEN: No obvious abdominal distention, no obvious hernias. Back: Spine is midline. No obvious trauma or outward signs of cauda equina  /ANORECTAL: Not assessed  MUSKULOSKELETAL: Normal ROM. No acute deformities. No edema. SKIN: Warm and dry. NEUROLOGICAL:  GCS 15. No obvious focal neurological deficits. PSYCHIATRIC: Normal affect, normal insight and judgement. Alert and oriented x 3. DIAGNOSTIC RESULTS:     LABS:    No results found for this visit on 07/29/22. RADIOLOGY:  All x-ray studies are viewed/reviewed by me. Formal interpretations per the radiologist are as follows: No orders to display           EKG:  See EKG interpretation by an attending physician.       PROCEDURES:   N/A    CRITICAL CARE TIME:   N/A    CONSULTS:  None      EMERGENCY DEPARTMENT COURSE andDIFFERENTIAL DIAGNOSIS/MDM:   Vitals:    Vitals:    07/29/22 2311   BP: (!) 144/99   Pulse: 99   Resp: 14   Temp: 98 °F (36.7 °C)   TempSrc: Temporal SpO2: 98%   Weight: 250 lb (113.4 kg)   Height: 5' 5\" (1.651 m)       Patient wasgiven the following medications:  Medications - No data to display      Patient was evaluated independently by myself with the attending physician available for consultation. Patient presented to the emergency department today with complaints of a sore throat, patient states that for the last several weeks she has been treated with 4 different antibiotics, states that she keeps going back to the urgent care and they keep putting her back on new antibiotics. Her physical exam is unremarkable she had no cervical adenopathy and her oropharynx is not even erythematous, at this point I recommend she follow-up with ENT as I did not want to put her on antibiotics again I saw no indications for it however with her chronic sore throat I do feel that ENT would be reasonable follow-up. She was discharged in good condition. Patient laboratory studies, radiographic imaging, and assessment were all discussed with the patient and/orpatient family. There was shared decision-making between myself as well as the patient and/or their surrogate and we are all in agreement with discharge home. There was an opportunity for questions and all questions were answered tothe best of my ability and to the satisfaction of the patient and/or patient family. FINAL IMPRESSION:      1.  Acute pharyngitis, unspecified etiology          DISPOSITION/PLAN:   DISPOSITION Decision To Discharge      PATIENT REFERRED TO:  Julianne Robledo MD  [address] [place] [address]  [phone]    Call   For follow up      DISCHARGE MEDICATIONS:  Discharge Medication List as of 7/29/2022 11:40 PM                     (Please note thatportions of this note were completed with a voice recognition program.  Efforts were made to edit the dictations, but occasionally words are mis-transcribed.)    TRINI Escobar - CNP-C (electronicallysigned)       Jayme Alvarado Roberto Thomas, TRINI - CNP  07/31/22 2716

## 2022-08-04 ENCOUNTER — OFFICE VISIT (OUTPATIENT)
Dept: ENT CLINIC | Age: 27
End: 2022-08-04
Payer: COMMERCIAL

## 2022-08-04 VITALS — BODY MASS INDEX: 41.65 KG/M2 | HEIGHT: 65 IN | WEIGHT: 250 LBS | TEMPERATURE: 97 F

## 2022-08-04 DIAGNOSIS — K21.9 LARYNGOPHARYNGEAL REFLUX (LPR): ICD-10-CM

## 2022-08-04 DIAGNOSIS — J02.9 PHARYNGITIS, UNSPECIFIED ETIOLOGY: Primary | ICD-10-CM

## 2022-08-04 PROCEDURE — 31575 DIAGNOSTIC LARYNGOSCOPY: CPT | Performed by: STUDENT IN AN ORGANIZED HEALTH CARE EDUCATION/TRAINING PROGRAM

## 2022-08-04 PROCEDURE — 1036F TOBACCO NON-USER: CPT | Performed by: STUDENT IN AN ORGANIZED HEALTH CARE EDUCATION/TRAINING PROGRAM

## 2022-08-04 PROCEDURE — 99204 OFFICE O/P NEW MOD 45 MIN: CPT | Performed by: STUDENT IN AN ORGANIZED HEALTH CARE EDUCATION/TRAINING PROGRAM

## 2022-08-04 PROCEDURE — G8427 DOCREV CUR MEDS BY ELIG CLIN: HCPCS | Performed by: STUDENT IN AN ORGANIZED HEALTH CARE EDUCATION/TRAINING PROGRAM

## 2022-08-04 PROCEDURE — G8417 CALC BMI ABV UP PARAM F/U: HCPCS | Performed by: STUDENT IN AN ORGANIZED HEALTH CARE EDUCATION/TRAINING PROGRAM

## 2022-08-04 RX ORDER — DOXYCYCLINE HYCLATE 100 MG
100 TABLET ORAL 2 TIMES DAILY
Qty: 20 TABLET | Refills: 0 | Status: SHIPPED | OUTPATIENT
Start: 2022-08-04 | End: 2022-08-14

## 2022-08-04 RX ORDER — OMEPRAZOLE 40 MG/1
40 CAPSULE, DELAYED RELEASE ORAL
Qty: 90 CAPSULE | Refills: 1 | Status: SHIPPED | OUTPATIENT
Start: 2022-08-04 | End: 2022-08-23

## 2022-08-04 ASSESSMENT — ENCOUNTER SYMPTOMS
VOMITING: 0
EYE PAIN: 0
SORE THROAT: 1
VOICE CHANGE: 1
NAUSEA: 0
RHINORRHEA: 0
COUGH: 0
SHORTNESS OF BREATH: 0

## 2022-08-04 NOTE — PROGRESS NOTES
Veronica      Patient Name: Tacos Record Number:  [id number]  Primary Care Physician:  ESTELLA Elkins  Date of Consultation: 8/4/2022    Chief Complaint:   Chief Complaint   Patient presents with    New Patient     Patient states that she has had a sore throat for 3 weeks now. She states that it started with strep. She states that the antibiotics did not help           HISTORY OF PRESENT ILLNESS  [facility] is a(n) 24 y.o. female who presents with pharyngitis. She was seen in the emergency department and told to follow up with ENT. No signs of infection were present at the time of evaluation on 7/31/2022. Prior to being seen in the ER she was seen by her primary care and diagnosed with a sinus infection and strep throat and given Augmentin. The sore throat has persisted however the sinus symptoms have improved. She also has some hoarseness at the end of the day. She was taking pantoprazole 40 mg last year however has stopped and has not resumed. She does have occasional reflux symptoms.       Patient Active Problem List   Diagnosis    High-risk pregnancy    Bilateral polycystic ovarian syndrome    Need for rhogam due to Rh negative mother    Chlamydia    Migraine headache    Vitamin D deficiency    Fatty liver disease, nonalcoholic     Past Surgical History:   Procedure Laterality Date    CYST REMOVAL      TONSILLECTOMY AND ADENOIDECTOMY       Family History   Problem Relation Age of Onset    Substance Abuse Mother     Asthma Mother     Substance Abuse Father     Heart Attack Maternal Grandmother     Heart Disease Maternal Grandmother     High Blood Pressure Maternal Grandmother     No Known Problems Maternal Grandfather     High Blood Pressure Paternal Grandmother     Heart Disease Paternal Grandmother     Stroke Paternal Grandmother     Atrial Fibrillation Paternal Grandmother     Heart Attack Paternal Grandmother     No Known Problems Paternal Grandfather     Breast Cancer Neg Hx     Colon Cancer Neg Hx     Diabetes Neg Hx      Social History     Socioeconomic History    Marital status: Single     Spouse name: Not on file    Number of children: Not on file    Years of education: Not on file    Highest education level: Not on file   Occupational History    Not on file   Tobacco Use    Smoking status: Never    Smokeless tobacco: Never   Vaping Use    Vaping Use: Never used   Substance and Sexual Activity    Alcohol use: Yes     Comment: occasional    Drug use: No    Sexual activity: Yes     Partners: Male     Comment: condoms   Other Topics Concern    Not on file   Social History Narrative    Not on file     Social Determinants of Health     Financial Resource Strain: Not on file   Food Insecurity: Not on file   Transportation Needs: Not on file   Physical Activity: Not on file   Stress: Not on file   Social Connections: Not on file   Intimate Partner Violence: Not on file   Housing Stability: Not on file       DRUG/FOOD ALLERGIES: Azo bladder control-go-less    CURRENT MEDICATIONS  Prior to Admission medications    Medication Sig Start Date End Date Taking? Authorizing Provider   doxycycline hyclate (VIBRA-TABS) 100 MG tablet Take 1 tablet by mouth in the morning and 1 tablet before bedtime. Do all this for 10 days.  8/4/22 8/14/22 Yes Ad Peacock, DO   omeprazole (PRILOSEC) 40 MG delayed release capsule Take 1 capsule by mouth every morning (before breakfast) 8/4/22  Yes Ad Peacock DO   naproxen (NAPROSYN) 500 MG tablet Take 1 tablet by mouth 2 times daily 11/20/21  Yes Victor M Champagne,    pantoprazole (PROTONIX) 40 MG tablet Take 1 tablet by mouth every morning (before breakfast) 7/14/21  Yes Sujey Thornton MD   valACYclovir (VALTREX) 1 g tablet Take 1 tablet by mouth as needed 10/19/20  Yes Historical Provider, MD   albuterol sulfate HFA (VENTOLIN HFA) 108 (90 Base) MCG/ACT inhaler Inhale 2 puffs into the lungs every 6 hours as needed for Wheezing 9/1/17  Yes Anusha Bronsons, APRN - CNP       REVIEW OF SYSTEMS  The following systems were reviewed and revealed the following in addition to any already discussed in the HPI:    Review of Systems   Constitutional:  Negative for fatigue and fever. HENT:  Positive for sore throat and voice change. Negative for congestion, ear pain, postnasal drip, rhinorrhea and sneezing. Eyes:  Negative for pain and visual disturbance. Respiratory:  Negative for cough and shortness of breath. Cardiovascular:  Negative for chest pain. Gastrointestinal:  Negative for nausea and vomiting. Endocrine: Negative. Genitourinary: Negative. Musculoskeletal:  Negative for neck pain and neck stiffness. Skin:  Negative for rash. Neurological:  Negative for dizziness and headaches.         PHYSICAL EXAM  Temp 97 °F (36.1 °C)   Ht 5' 5\" (1.651 m)   Wt 250 lb (113.4 kg)   LMP 07/18/2022   BMI 41.60 kg/m²     GENERAL: No Acute Distress, Alert and Oriented, no hoarseness  EYES: EOMI, Anti-icteric  NOSE: No epistaxis, nasal mucosa within normal limits, no purulent drainage, septal deviation to the left  EARS: Normal external canal appearance, EAC patent bilaterally, TMs intact bilaterally with no evidence of effusion  FACE: 1/6 House-Brackmann Scale, symmetric, sensation equal bilaterally  ORAL CAVITY: No masses or lesions palpated, uvula is midline, moist mucous membranes, surgically absent tonsils  NECK: Normal range of motion, no thyromegaly, trachea is midline, no lymphadenopathy, no neck masses, no crepitus  CHEST: Normal respiratory effort, no retractions, breathing comfortably  SKIN: No rashes, normal appearing skin, no evidence of skin lesions/tumors    RADIOLOGY  Summary of findings:      PROCEDURE  Flexible Laryngoscopy      Procedure : Flexible Laryngoscopy  Surgeon: Kennedy Tan DO  Anesthesia: Afrin with 4% lidocaine  Indication: Laryngeal mirror examination was not tolerated due to gag reflex  Description:  After verbal consent was obtained, the scope was passed along the floor of the right naris to the level of the larynx. There was no evidence of concerning masses or lesions of the base of tongue, vallecula, epiglottis, aryepiglottic folds, arytenoids, false vocal folds, true vocal folds, or pyriform sinuses. True vocal folds exhibited symmetric motion bilaterally without evidence of paralysis or paresis. The scope was removed. The patient tolerated the procedure without difficulty. There were no complications. Pertinent findings: Edema of the tongue base and adenoids with mild post arytenoid edema      ASSESSMENT/PLAN  Susan Pizarro is a very pleasant 24 y.o. female with     1. Pharyngitis, unspecified etiology  She has lymphoid hypertrophy making me believe she has some underlying infection going on. We will treat her with doxycycline to see if this improves the pain and inflammation.  - [place] LARYNGOSCOPY FLEXIBLE DIAGNOSTIC  - doxycycline hyclate (VIBRA-TABS) 100 MG tablet; Take 1 tablet by mouth in the morning and 1 tablet before bedtime. Do all this for 10 days. Dispense: 20 tablet; Refill: 0    2. Laryngopharyngeal reflux (LPR)  We will also resume her Prilosec to see if this could be the source of her hoarseness and potentially the cause of her pain  - omeprazole (PRILOSEC) 40 MG delayed release capsule; Take 1 capsule by mouth every morning (before breakfast)  Dispense: 90 capsule; Refill: 1    She will follow-up in approximately 1 month. If the symptoms do not improve we will consider imaging at that point in time. Medical Decision Making:   The following items were considered in medical decision making:  Independent review of images  Review / order clinical lab tests  Review / order radiology tests  Decision to obtain old records

## 2022-08-23 DIAGNOSIS — R10.30 LOWER ABDOMINAL PAIN: Primary | ICD-10-CM

## 2022-08-25 ENCOUNTER — OFFICE VISIT (OUTPATIENT)
Dept: ENT CLINIC | Age: 27
End: 2022-08-25
Payer: COMMERCIAL

## 2022-08-25 VITALS — HEIGHT: 65 IN | RESPIRATION RATE: 16 BRPM | BODY MASS INDEX: 41.65 KG/M2 | TEMPERATURE: 97.2 F | WEIGHT: 250 LBS

## 2022-08-25 DIAGNOSIS — K21.9 LARYNGOPHARYNGEAL REFLUX (LPR): Primary | ICD-10-CM

## 2022-08-25 PROCEDURE — G8427 DOCREV CUR MEDS BY ELIG CLIN: HCPCS | Performed by: STUDENT IN AN ORGANIZED HEALTH CARE EDUCATION/TRAINING PROGRAM

## 2022-08-25 PROCEDURE — 99213 OFFICE O/P EST LOW 20 MIN: CPT | Performed by: STUDENT IN AN ORGANIZED HEALTH CARE EDUCATION/TRAINING PROGRAM

## 2022-08-25 PROCEDURE — 1036F TOBACCO NON-USER: CPT | Performed by: STUDENT IN AN ORGANIZED HEALTH CARE EDUCATION/TRAINING PROGRAM

## 2022-08-25 PROCEDURE — G8417 CALC BMI ABV UP PARAM F/U: HCPCS | Performed by: STUDENT IN AN ORGANIZED HEALTH CARE EDUCATION/TRAINING PROGRAM

## 2022-08-25 RX ORDER — CALCIUM CARBONATE 200(500)MG
2 TABLET,CHEWABLE ORAL NIGHTLY
Qty: 30 TABLET | Refills: 3 | Status: SHIPPED | OUTPATIENT
Start: 2022-08-25 | End: 2022-09-24

## 2022-08-25 ASSESSMENT — ENCOUNTER SYMPTOMS
VOICE CHANGE: 1
RHINORRHEA: 0
NAUSEA: 0
SORE THROAT: 1
VOMITING: 0
SHORTNESS OF BREATH: 0
EYE PAIN: 0
COUGH: 0

## 2022-08-25 NOTE — PROGRESS NOTES
Veronica      Patient Name: Tacos Record Number:  [id number]  Primary Care Physician:  ESTELLA Kimbrough  Date of Consultation: 8/25/2022    Chief Complaint:   Chief Complaint   Patient presents with    Follow-up     States that her throat is still very painful, states that she has been trying not to take anything for the pain, states that she was choking while trying to eat yesterday       [facility]  Faustino Duran is a(n) 24 y.o. female who presents with pharyngitis. She was seen in the emergency department and told to follow up with ENT. No signs of infection were present at the time of evaluation on 7/31/2022. Prior to being seen in the ER she was seen by her primary care and diagnosed with a sinus infection and strep throat and given Augmentin. The sore throat has persisted however the sinus symptoms have improved. She also has some hoarseness at the end of the day. She was taking pantoprazole 40 mg last year however has stopped and has not resumed. She does have occasional reflux symptoms. Interval History 8/25/2022  Russ Bo took the doxycycline with some improvement in her sore throat. She is also taking her pantoprazole as prescribed. Within the last week she has began having severe stomach pain with vomiting. Her GI physician ordered a CT scan of her abdomen. She continues to have a sore throat with feeling of something being stuck down by her larynx.     Patient Active Problem List   Diagnosis    High-risk pregnancy    Bilateral polycystic ovarian syndrome    Need for rhogam due to Rh negative mother    Chlamydia    Migraine headache    Vitamin D deficiency    Fatty liver disease, nonalcoholic     Past Surgical History:   Procedure Laterality Date    CYST REMOVAL      TONSILLECTOMY AND ADENOIDECTOMY       Family History   Problem Relation Age of Onset    Substance Abuse Mother     Asthma Mother Substance Abuse Father     Heart Attack Maternal Grandmother     Heart Disease Maternal Grandmother     High Blood Pressure Maternal Grandmother     No Known Problems Maternal Grandfather     High Blood Pressure Paternal Grandmother     Heart Disease Paternal Grandmother     Stroke Paternal Grandmother     Atrial Fibrillation Paternal Grandmother     Heart Attack Paternal Grandmother     No Known Problems Paternal Grandfather     Breast Cancer Neg Hx     Colon Cancer Neg Hx     Diabetes Neg Hx      Social History     Socioeconomic History    Marital status: Single     Spouse name: Not on file    Number of children: Not on file    Years of education: Not on file    Highest education level: Not on file   Occupational History    Not on file   Tobacco Use    Smoking status: Never    Smokeless tobacco: Never   Vaping Use    Vaping Use: Never used   Substance and Sexual Activity    Alcohol use: Yes     Comment: occasional    Drug use: No    Sexual activity: Yes     Partners: Male     Comment: condoms   Other Topics Concern    Not on file   Social History Narrative    Not on file     Social Determinants of Health     Financial Resource Strain: Not on file   Food Insecurity: Not on file   Transportation Needs: Not on file   Physical Activity: Not on file   Stress: Not on file   Social Connections: Not on file   Intimate Partner Violence: Not on file   Housing Stability: Not on file       DRUG/FOOD ALLERGIES: Azo bladder control-go-less    CURRENT MEDICATIONS  Prior to Admission medications    Medication Sig Start Date End Date Taking?  Authorizing Provider   calcium carbonate (ANTACID) 500 MG chewable tablet Take 2 tablets by mouth at bedtime 8/25/22 9/24/22 Yes Ad Peacock, DO   naproxen (NAPROSYN) 500 MG tablet Take 1 tablet by mouth 2 times daily 11/20/21  Yes Yani Alcazar, DO   pantoprazole (PROTONIX) 40 MG tablet Take 1 tablet by mouth every morning (before breakfast) 7/14/21  Yes Karolina Kilgore MD   valACYclovir (VALTREX) 1 g tablet Take 1 tablet by mouth as needed 10/19/20  Yes Historical Provider, MD   albuterol sulfate HFA (VENTOLIN HFA) 108 (90 Base) MCG/ACT inhaler Inhale 2 puffs into the lungs every 6 hours as needed for Wheezing 9/1/17  Yes TRINI Moon - CNP       REVIEW OF SYSTEMS  The following systems were reviewed and revealed the following in addition to any already discussed in the HPI:    Review of Systems   Constitutional:  Negative for fatigue and fever. HENT:  Positive for sore throat and voice change. Negative for congestion, ear pain, postnasal drip, rhinorrhea and sneezing. Eyes:  Negative for pain and visual disturbance. Respiratory:  Negative for cough and shortness of breath. Cardiovascular:  Negative for chest pain. Gastrointestinal:  Negative for nausea and vomiting. Endocrine: Negative. Genitourinary: Negative. Musculoskeletal:  Negative for neck pain and neck stiffness. Skin:  Negative for rash. Neurological:  Negative for dizziness and headaches.         PHYSICAL EXAM  Temp 97.2 °F (36.2 °C) (Infrared)   Resp 16   Ht 5' 5\" (1.651 m)   Wt 250 lb (113.4 kg)   BMI 41.60 kg/m²     GENERAL: No Acute Distress, Alert and Oriented, no hoarseness  EYES: EOMI, Anti-icteric  NOSE: No epistaxis, nasal mucosa within normal limits, no purulent drainage, septal deviation to the left  EARS: Normal external canal appearance, EAC patent bilaterally, TMs intact bilaterally with no evidence of effusion  FACE: 1/6 House-Brackmann Scale, symmetric, sensation equal bilaterally  ORAL CAVITY: No masses or lesions palpated, uvula is midline, moist mucous membranes, surgically absent tonsils  NECK: Normal range of motion, no thyromegaly, trachea is midline, no lymphadenopathy, no neck masses, no crepitus  CHEST: Normal respiratory effort, no retractions, breathing comfortably  SKIN: No rashes, normal appearing skin, no evidence of skin lesions/tumors    RADIOLOGY  Summary of findings:    PROCEDURE  Flexible Laryngoscopy  Procedure : Flexible Laryngoscopy  Surgeon: Kennedy Tan DO  Anesthesia: Afrin with 4% lidocaine  Indication: Laryngeal mirror examination was not tolerated due to gag reflex  Description:  After verbal consent was obtained, the scope was passed along the floor of the right naris to the level of the larynx. There was no evidence of concerning masses or lesions of the base of tongue, vallecula, epiglottis, aryepiglottic folds, arytenoids, false vocal folds, true vocal folds, or pyriform sinuses. True vocal folds exhibited symmetric motion bilaterally without evidence of paralysis or paresis. The scope was removed. The patient tolerated the procedure without difficulty. There were no complications. Pertinent findings: Edema of the tongue base and adenoids with mild post arytenoid edema    ASSESSMENT/PLAN  Kathya Jiang is a very pleasant 24 y.o. female with     1. Laryngopharyngeal reflux (LPR)  Given her stomach issues I think that this has a lot to do with her throat pain. The discomfort is likely from acid exposure given that she has been vomiting recently. We will add Gaviscon to her nightly regimen to see if we can control her acid reflux more effectively and in turn her throat pain. She should have her CT scan of her abdomen performed. - calcium carbonate (ANTACID) 500 MG chewable tablet; Take 2 tablets by mouth at bedtime  Dispense: 30 tablet; Refill: 3    Follow-up in 2 to 3 months. Medical Decision Making:   The following items were considered in medical decision making:  Independent review of images  Review / order clinical lab tests  Review / order radiology tests  Decision to obtain old records

## 2022-10-08 ENCOUNTER — HOSPITAL ENCOUNTER (EMERGENCY)
Age: 27
Discharge: HOME | End: 2022-10-09
Attending: EMERGENCY MEDICINE
Payer: COMMERCIAL

## 2022-10-08 VITALS
RESPIRATION RATE: 16 BRPM | HEART RATE: 91 BPM | DIASTOLIC BLOOD PRESSURE: 74 MMHG | SYSTOLIC BLOOD PRESSURE: 131 MMHG | OXYGEN SATURATION: 98 % | TEMPERATURE: 98.3 F

## 2022-10-08 DIAGNOSIS — R51.9 NONINTRACTABLE HEADACHE, UNSPECIFIED CHRONICITY PATTERN, UNSPECIFIED HEADACHE TYPE: Primary | ICD-10-CM

## 2022-10-08 LAB
A/G RATIO: 1.9 (ref 1.1–2.2)
ALBUMIN SERPL-MCNC: 4.3 G/DL (ref 3.4–5)
ALP BLD-CCNC: 106 U/L (ref 40–129)
ALT SERPL-CCNC: 34 U/L (ref 10–40)
ANION GAP SERPL CALCULATED.3IONS-SCNC: 12 MMOL/L (ref 3–16)
AST SERPL-CCNC: 26 U/L (ref 15–37)
BASOPHILS ABSOLUTE: 0.1 K/UL (ref 0–0.2)
BASOPHILS RELATIVE PERCENT: 1.4 %
BILIRUB SERPL-MCNC: <0.2 MG/DL (ref 0–1)
BUN BLDV-MCNC: 12 MG/DL (ref 7–20)
CALCIUM SERPL-MCNC: 9.8 MG/DL (ref 8.3–10.6)
CHLORIDE BLD-SCNC: 100 MMOL/L (ref 99–110)
CO2: 25 MMOL/L (ref 21–32)
CREAT SERPL-MCNC: 0.7 MG/DL (ref 0.6–1.1)
EOSINOPHILS ABSOLUTE: 0.3 K/UL (ref 0–0.6)
EOSINOPHILS RELATIVE PERCENT: 3.2 %
GFR AFRICAN AMERICAN: >60
GFR NON-AFRICAN AMERICAN: >60
GLUCOSE BLD-MCNC: 108 MG/DL (ref 70–99)
HCG QUALITATIVE: NEGATIVE
HCT VFR BLD CALC: 38.4 % (ref 36–48)
HEMOGLOBIN: 13 G/DL (ref 12–16)
LYMPHOCYTES ABSOLUTE: 2.6 K/UL (ref 1–5.1)
LYMPHOCYTES RELATIVE PERCENT: 28.5 %
MCH RBC QN AUTO: 30.4 PG (ref 26–34)
MCHC RBC AUTO-ENTMCNC: 33.9 G/DL (ref 31–36)
MCV RBC AUTO: 89.6 FL (ref 80–100)
MONO TEST: NEGATIVE
MONOCYTES ABSOLUTE: 0.4 K/UL (ref 0–1.3)
MONOCYTES RELATIVE PERCENT: 4.9 %
NEUTROPHILS ABSOLUTE: 5.6 K/UL (ref 1.7–7.7)
NEUTROPHILS RELATIVE PERCENT: 62 %
PDW BLD-RTO: 13.3 % (ref 12.4–15.4)
PLATELET # BLD: 215 K/UL (ref 135–450)
PMV BLD AUTO: 9 FL (ref 5–10.5)
POTASSIUM SERPL-SCNC: 3.7 MMOL/L (ref 3.5–5.1)
RBC # BLD: 4.29 M/UL (ref 4–5.2)
SODIUM BLD-SCNC: 137 MMOL/L (ref 136–145)
TOTAL PROTEIN: 6.6 G/DL (ref 6.4–8.2)
WBC # BLD: 9 K/UL (ref 4–11)

## 2022-10-08 PROCEDURE — 96375 TX/PRO/DX INJ NEW DRUG ADDON: CPT

## 2022-10-08 PROCEDURE — 85025 COMPLETE CBC W/AUTO DIFF WBC: CPT

## 2022-10-08 PROCEDURE — 6360000002 HC RX W HCPCS: Performed by: EMERGENCY MEDICINE

## 2022-10-08 PROCEDURE — 84703 CHORIONIC GONADOTROPIN ASSAY: CPT

## 2022-10-08 PROCEDURE — 96374 THER/PROPH/DIAG INJ IV PUSH: CPT

## 2022-10-08 PROCEDURE — 99284 EMERGENCY DEPT VISIT MOD MDM: CPT

## 2022-10-08 PROCEDURE — 2580000003 HC RX 258: Performed by: EMERGENCY MEDICINE

## 2022-10-08 PROCEDURE — 80053 COMPREHEN METABOLIC PANEL: CPT

## 2022-10-08 PROCEDURE — 86308 HETEROPHILE ANTIBODY SCREEN: CPT

## 2022-10-08 PROCEDURE — 6370000000 HC RX 637 (ALT 250 FOR IP): Performed by: EMERGENCY MEDICINE

## 2022-10-08 RX ORDER — KETOROLAC TROMETHAMINE 30 MG/ML
15 INJECTION, SOLUTION INTRAMUSCULAR; INTRAVENOUS
Status: COMPLETED | OUTPATIENT
Start: 2022-10-08 | End: 2022-10-08

## 2022-10-08 RX ORDER — ACETAMINOPHEN 325 MG/1
650 TABLET ORAL EVERY 4 HOURS PRN
Status: DISCONTINUED | OUTPATIENT
Start: 2022-10-08 | End: 2022-10-09 | Stop reason: HOSPADM

## 2022-10-08 RX ORDER — DIPHENHYDRAMINE HYDROCHLORIDE 50 MG/ML
50 INJECTION INTRAMUSCULAR; INTRAVENOUS
Status: COMPLETED | OUTPATIENT
Start: 2022-10-08 | End: 2022-10-08

## 2022-10-08 RX ORDER — ONDANSETRON 2 MG/ML
4 INJECTION INTRAMUSCULAR; INTRAVENOUS ONCE
Status: COMPLETED | OUTPATIENT
Start: 2022-10-08 | End: 2022-10-08

## 2022-10-08 RX ORDER — METOCLOPRAMIDE HYDROCHLORIDE 5 MG/ML
10 INJECTION INTRAMUSCULAR; INTRAVENOUS
Status: COMPLETED | OUTPATIENT
Start: 2022-10-08 | End: 2022-10-08

## 2022-10-08 RX ORDER — 0.9 % SODIUM CHLORIDE 0.9 %
1000 INTRAVENOUS SOLUTION INTRAVENOUS ONCE
Status: COMPLETED | OUTPATIENT
Start: 2022-10-08 | End: 2022-10-08

## 2022-10-08 RX ADMIN — KETOROLAC TROMETHAMINE 15 MG: 30 INJECTION, SOLUTION INTRAMUSCULAR; INTRAVENOUS at 23:12

## 2022-10-08 RX ADMIN — ONDANSETRON 4 MG: 2 INJECTION INTRAMUSCULAR; INTRAVENOUS at 22:35

## 2022-10-08 RX ADMIN — DIPHENHYDRAMINE HYDROCHLORIDE 50 MG: 50 INJECTION, SOLUTION INTRAMUSCULAR; INTRAVENOUS at 23:10

## 2022-10-08 RX ADMIN — SODIUM CHLORIDE 1000 ML: 9 INJECTION, SOLUTION INTRAVENOUS at 22:42

## 2022-10-08 RX ADMIN — METOCLOPRAMIDE 10 MG: 5 INJECTION, SOLUTION INTRAMUSCULAR; INTRAVENOUS at 23:10

## 2022-10-08 RX ADMIN — ACETAMINOPHEN 650 MG: 325 TABLET ORAL at 22:41

## 2022-10-08 ASSESSMENT — PAIN SCALES - GENERAL
PAINLEVEL_OUTOF10: 2
PAINLEVEL_OUTOF10: 7
PAINLEVEL_OUTOF10: 6
PAINLEVEL_OUTOF10: 3
PAINLEVEL_OUTOF10: 5

## 2022-10-08 ASSESSMENT — PAIN - FUNCTIONAL ASSESSMENT
PAIN_FUNCTIONAL_ASSESSMENT: 0-10
PAIN_FUNCTIONAL_ASSESSMENT: 0-10

## 2022-10-08 NOTE — Clinical Note
Margie Millan was seen and treated in our emergency department on 10/8/2022. She may return to work on 10/10/2022. If you have any questions or concerns, please don't hesitate to call.       Maicol Soria MD

## 2022-10-09 NOTE — ED PROVIDER NOTES
[facility]  ED      CHIEF COMPLAINT  Migraine (Pt reports migraine x6 days, with dizziness, light-sensitivity, and nausea/vomiting. Pt reports hx of migraines but states \"it's never been this bad. \" Pt reports pain radiates to the back of the neck and down the back. Pt denies chance of being pregnant. Pt reports negative covid test 3 days ago. ) and Emesis       HISTORY OF PRESENT ILLNESS  Cirilo Velazco is a 24 y.o. female with history of anxiety, depression, headaches, who presents to the emergency department for evaluation of headache. Reports having intermittent headache for the last 6 days. Says it feels like her prior migraines. Says she had significant migraines several years ago, but has not required any medications. Does not have a PCP right now. Has tried Tylenol, ibuprofen, and Excedrin with some improvement of symptoms. Says she is having light sensitivity, light flashing at the corner of her eyes, nausea, and dizziness. Describes her dizziness as feeling lightheaded for a few seconds when she stands up too quickly or sits up too quickly. Says it quickly goes away. Reports currently her pain is bioccipital and radiates down towards the neck. No pain with turning her neck. No fevers or chills      No other complaints, modifying factors or associated symptoms. I have reviewed the following from the nursing documentation.     Past Medical History:   Diagnosis Date    Anxiety     Asthma     Back pain     Depression     Fatty liver disease, nonalcoholic     Headache     Ovarian torsion     PCOS (polycystic ovarian syndrome)      Past Surgical History:   Procedure Laterality Date    CYST REMOVAL      TONSILLECTOMY AND ADENOIDECTOMY       Family History   Problem Relation Age of Onset    Substance Abuse Mother     Asthma Mother     Substance Abuse Father     Heart Attack Maternal Grandmother     Heart Disease Maternal Grandmother     High Blood Pressure Maternal Grandmother     No Known Problems Maternal Grandfather     High Blood Pressure Paternal Grandmother     Heart Disease Paternal Grandmother     Stroke Paternal Grandmother     Atrial Fibrillation Paternal Grandmother     Heart Attack Paternal Grandmother     No Known Problems Paternal Grandfather     Breast Cancer Neg Hx     Colon Cancer Neg Hx     Diabetes Neg Hx      Social History     Socioeconomic History    Marital status: Single     Spouse name: Not on file    Number of children: Not on file    Years of education: Not on file    Highest education level: Not on file   Occupational History    Not on file   Tobacco Use    Smoking status: Never    Smokeless tobacco: Never   Vaping Use    Vaping Use: Never used   Substance and Sexual Activity    Alcohol use: Yes     Comment: occasional    Drug use: No    Sexual activity: Yes     Partners: Male     Comment: condoms   Other Topics Concern    Not on file   Social History Narrative    Not on file     Social Determinants of Health     Financial Resource Strain: Not on file   Food Insecurity: Not on file   Transportation Needs: Not on file   Physical Activity: Not on file   Stress: Not on file   Social Connections: Not on file   Intimate Partner Violence: Not on file   Housing Stability: Not on file     Current Facility-Administered Medications   Medication Dose Route Frequency Provider Last Rate Last Admin    acetaminophen (TYLENOL) tablet 650 mg  650 mg Oral Q4H PRN Lev Riley MD   650 mg at 10/08/22 2241     Current Outpatient Medications   Medication Sig Dispense Refill    naproxen (NAPROSYN) 500 MG tablet Take 1 tablet by mouth 2 times daily 20 tablet 0    pantoprazole (PROTONIX) 40 MG tablet Take 1 tablet by mouth every morning (before breakfast) 30 tablet 5    valACYclovir (VALTREX) 1 g tablet Take 1 tablet by mouth as needed      albuterol sulfate HFA (VENTOLIN HFA) 108 (90 Base) MCG/ACT inhaler Inhale 2 puffs into the lungs every 6 hours as needed for Wheezing 1 Inhaler 3 Allergies   Allergen Reactions    Azo Bladder Control-Go-Less        PMH, Surgical Hx, FH, Social Hx reviewed by myself. REVIEW OF SYSTEMS  10 systems reviewed, pertinent positives per HPI otherwise noted to be negative. PHYSICAL EXAM  /74   Pulse 91   Temp 98.3 °F (36.8 °C) (Oral)   Resp 16   SpO2 98%    GENERAL APPEARANCE: Awake and alert. No acute distress. HENT: Normocephalic. Atraumatic. EOMI. No facial droop. No nuchal rigidity. HEART/CHEST: RRR. LUNGS: Respirations unlabored. Speaking comfortably in full sentences. ABDOMEN: Soft, non-distended abdomen. Non tender to palpation. No guarding. No rebound. EXTREMITIES: No gross deformities. Moving all extremities. SKIN: Warm and dry. No acute rashes. NEUROLOGICAL: Alert and oriented. No gross facial drooping. Answering questions appropriately. Moving all extremities. PSYCHIATRIC: Pleasant. Normal mood and affect.     LABS  Results for orders placed or performed during the hospital encounter of 10/08/22   CBC with Auto Differential   Result Value Ref Range    WBC 9.0 4.0 - 11.0 K/uL    RBC 4.29 4.00 - 5.20 M/uL    Hemoglobin 13.0 12.0 - 16.0 g/dL    Hematocrit 38.4 36.0 - 48.0 %    MCV 89.6 80.0 - 100.0 fL    MCH 30.4 26.0 - 34.0 pg    MCHC 33.9 31.0 - 36.0 g/dL    RDW 13.3 12.4 - 15.4 %    Platelets 941 135 - 310 K/uL    MPV 9.0 5.0 - 10.5 fL    Neutrophils % 62.0 %    Lymphocytes % 28.5 %    Monocytes % 4.9 %    Eosinophils % 3.2 %    Basophils % 1.4 %    Neutrophils Absolute 5.6 1.7 - 7.7 K/uL    Lymphocytes Absolute 2.6 1.0 - 5.1 K/uL    Monocytes Absolute 0.4 0.0 - 1.3 K/uL    Eosinophils Absolute 0.3 0.0 - 0.6 K/uL    Basophils Absolute 0.1 0.0 - 0.2 K/uL   Comprehensive Metabolic Panel   Result Value Ref Range    Sodium 137 136 - 145 mmol/L    Potassium 3.7 3.5 - 5.1 mmol/L    Chloride 100 99 - 110 mmol/L    CO2 25 21 - 32 mmol/L    Anion Gap 12 3 - 16    Glucose 108 (H) 70 - 99 mg/dL    BUN 12 7 - 20 mg/dL    Creatinine 0.7 0.6 - 1.1 mg/dL    GFR Non-African American >60 >60    GFR African American >60 >60    Calcium 9.8 8.3 - 10.6 mg/dL    Total Protein 6.6 6.4 - 8.2 g/dL    Albumin 4.3 3.4 - 5.0 g/dL    Albumin/Globulin Ratio 1.9 1.1 - 2.2    Total Bilirubin <0.2 0.0 - 1.0 mg/dL    Alkaline Phosphatase 106 40 - 129 U/L    ALT 34 10 - 40 U/L    AST 26 15 - 37 U/L   HCG Qualitative, Serum   Result Value Ref Range    hCG Qual Negative Detects HCG level >10 MIU/mL   Mononucleosis screen   Result Value Ref Range    Mono Test Negative Negative       I have reviewed all labs for this visit. RADIOLOGY  No results found. ED COURSE/MDM  Patient seen and evaluated. At presentation, patient was awake, alert, afebrile, hemodynamically stable, and satting well on room air. She had no nuchal rigidity. No fever. Given this, low concern for meningitis or encephalitis at this time. She was given Zofran, 1 L IV fluid bolus, Toradol, Reglan, and Benadryl for symptoms. Creatinine is normal.  Pregnancy test negative. On reassessment, patient reports a complete resolution of symptoms. Labs within normal limits. She denies having any questions or concerns. She has an appointment coming up with her PCP. She was discharged home with strict return precautions. Is this patient to be included in the SEP-1 Core Measure due to severe sepsis or septic shock? No   Exclusion criteria - the patient is NOT to be included for SEP-1 Core Measure due to:  2+ SIRS criteria are not met     Pt was seen during the COVID 19 pandemic. Appropriate PPE worn by ME during patient encounters. Patient was cared for during a time with constrained hospital bed capacity with nationwide stress on resources and staffing.       During the patient's ED course, the patient was given:  Medications   acetaminophen (TYLENOL) tablet 650 mg (650 mg Oral Given 10/8/22 2241)   0.9 % sodium chloride bolus (0 mLs IntraVENous Stopped 10/8/22 2331)   ondansetron (ZOFRAN) injection 4 mg (4 mg IntraVENous Given 10/8/22 2235)   diphenhydrAMINE (BENADRYL) injection 50 mg (50 mg IntraVENous Given 10/8/22 2310)   ketorolac (TORADOL) injection 15 mg (15 mg IntraVENous Given 10/8/22 2312)   metoclopramide (REGLAN) injection 10 mg (10 mg IntraVENous Given 10/8/22 2310)        CLINICAL IMPRESSION  1. Nonintractable headache, unspecified chronicity pattern, unspecified headache type        Blood pressure 131/74, pulse 91, temperature 98.3 °F (36.8 °C), temperature source Oral, resp. rate 16, SpO2 98 %, not currently breastfeeding. Marylou Hernandez was discharged home in stable condition. Patient was given scripts for the following medications. I counseled patient how to take these medications. New Prescriptions    No medications on file       Follow-up with:  Eual Schirmer, [facility] [address]  [phone]    In 2 days      DISCLAIMER: This chart was created using Dragon dictation software. Efforts were made by me to ensure accuracy, however some errors may be present due to limitations of this technology and occasionally words are not transcribed correctly.        Hilton Valladares MD  10/09/22 0000

## 2022-10-10 ENCOUNTER — HOSPITAL ENCOUNTER (EMERGENCY)
Age: 27
Discharge: HOME | End: 2022-10-10
Payer: COMMERCIAL

## 2022-10-10 VITALS
SYSTOLIC BLOOD PRESSURE: 136 MMHG | TEMPERATURE: 98.3 F | HEART RATE: 93 BPM | OXYGEN SATURATION: 98 % | RESPIRATION RATE: 16 BRPM | DIASTOLIC BLOOD PRESSURE: 92 MMHG | WEIGHT: 260 LBS | BODY MASS INDEX: 43.27 KG/M2

## 2022-10-10 DIAGNOSIS — M79.642 HAND PAIN, LEFT: Primary | ICD-10-CM

## 2022-10-10 PROCEDURE — 99283 EMERGENCY DEPT VISIT LOW MDM: CPT

## 2022-10-10 RX ORDER — GABAPENTIN 100 MG/1
100 CAPSULE ORAL 3 TIMES DAILY
Qty: 30 CAPSULE | Refills: 0 | Status: SHIPPED | OUTPATIENT
Start: 2022-10-10 | End: 2022-10-20

## 2022-10-10 ASSESSMENT — PAIN DESCRIPTION - ORIENTATION: ORIENTATION: LEFT

## 2022-10-10 ASSESSMENT — PAIN - FUNCTIONAL ASSESSMENT
PAIN_FUNCTIONAL_ASSESSMENT: 0-10
PAIN_FUNCTIONAL_ASSESSMENT: 0-10

## 2022-10-10 ASSESSMENT — ENCOUNTER SYMPTOMS
RESPIRATORY NEGATIVE: 1
GASTROINTESTINAL NEGATIVE: 1

## 2022-10-10 ASSESSMENT — PAIN SCALES - GENERAL
PAINLEVEL_OUTOF10: 3
PAINLEVEL_OUTOF10: 3

## 2022-10-10 ASSESSMENT — PAIN DESCRIPTION - LOCATION: LOCATION: HAND

## 2022-10-11 NOTE — ED NOTES
Pt scripts x2 instructed to follow up with PCP. Assessed per John SORIA.      Kristal High LPN  24/36/47 [id number]

## 2022-10-11 NOTE — ED PROVIDER NOTES
[facility]  ED  EMERGENCY DEPARTMENT ENCOUNTER        Pt Name: Lucita Saldana  MRN: [id number]  [place] 1995  Date of evaluation: 10/10/2022  Provider: Mikayla Mccormick PA-C  PCP: ESTELLA Tyler  Note Started: 9:39 PM EDT       CLARA. I have evaluated this patient. My supervising physician was available for consultation. CHIEF COMPLAINT       Chief Complaint   Patient presents with    Hand Pain     States pain in L hand from IV on sat, states pain to elbow        HISTORY OF PRESENT ILLNESS   (Location, Timing/Onset, Context/Setting, Quality, Duration, Modifying Factors, Severity, Associated Signs and Symptoms)  Note limiting factors. Chief Complaint: Left hand pain    Lucita Saldana is a 24 y.o. female who presents complaining of left hand pain x2 days. Patient was seen in this ER for headache, had IV started, states it took multiple attempts to start an IV. She had no issues with her IV fluids and IV during the stay. She states after she left that her hand swelled for a day, swelling is now improved. She now has sharp, burning pain that intermittently radiates from the left hand to the left elbow, worse with movement, relieved by rest.  Denies weakness, paresthesia, redness, swelling, other trauma. Nursing Notes were all reviewed and agreed with or any disagreements were addressed in the HPI. REVIEW OF SYSTEMS    (2-9 systems for level 4, 10 or more for level 5)     Review of Systems   Constitutional: Negative. Respiratory: Negative. Cardiovascular: Negative. Gastrointestinal: Negative. Musculoskeletal: Negative. Skin: Negative. Neurological: Negative. Hematological: Negative. Positives and Pertinent negatives as per HPI. Except as noted above in the ROS, all other systems were reviewed and negative.        PAST MEDICAL HISTORY     Past Medical History:   Diagnosis Date    Anxiety     Asthma     Back pain     Depression     Fatty liver disease, nonalcoholic     Headache     Ovarian torsion     PCOS (polycystic ovarian syndrome)          SURGICAL HISTORY     Past Surgical History:   Procedure Laterality Date    CYST REMOVAL      TONSILLECTOMY AND ADENOIDECTOMY           CURRENTMEDICATIONS       Previous Medications    ALBUTEROL SULFATE HFA (VENTOLIN HFA) 108 (90 BASE) MCG/ACT INHALER    Inhale 2 puffs into the lungs every 6 hours as needed for Wheezing    NAPROXEN (NAPROSYN) 500 MG TABLET    Take 1 tablet by mouth 2 times daily    PANTOPRAZOLE (PROTONIX) 40 MG TABLET    Take 1 tablet by mouth every morning (before breakfast)    VALACYCLOVIR (VALTREX) 1 G TABLET    Take 1 tablet by mouth as needed         ALLERGIES     Azo bladder control-go-less    FAMILYHISTORY       Family History   Problem Relation Age of Onset    Substance Abuse Mother     Asthma Mother     Substance Abuse Father     Heart Attack Maternal Grandmother     Heart Disease Maternal Grandmother     High Blood Pressure Maternal Grandmother     No Known Problems Maternal Grandfather     High Blood Pressure Paternal Grandmother     Heart Disease Paternal Grandmother     Stroke Paternal Grandmother     Atrial Fibrillation Paternal Grandmother     Heart Attack Paternal Grandmother     No Known Problems Paternal Grandfather     Breast Cancer Neg Hx     Colon Cancer Neg Hx     Diabetes Neg Hx           SOCIAL HISTORY       Social History     Tobacco Use    Smoking status: Never    Smokeless tobacco: Never   Vaping Use    Vaping Use: Never used   Substance Use Topics    Alcohol use: Yes     Comment: occasional    Drug use: No       SCREENINGS             PHYSICAL EXAM    (up to 7 for level 4, 8 or more for level 5)     ED Triage Vitals [10/10/22 1943]   BP Temp Temp Source Heart Rate Resp SpO2 Height Weight   (!) 174/93 98.3 °F (36.8 °C) Oral 88 18 95 % -- 260 lb (117.9 kg)       Physical Exam  Vitals and nursing note reviewed. Constitutional:       General: She is not in acute distress. Appearance: Normal appearance. She is not ill-appearing or toxic-appearing. HENT:      Head: Normocephalic and atraumatic. Right Ear: External ear normal.      Left Ear: External ear normal.   Eyes:      Conjunctiva/sclera: Conjunctivae normal.   Cardiovascular:      Rate and Rhythm: Normal rate. Pulses: Normal pulses. Pulmonary:      Effort: Pulmonary effort is normal.   Musculoskeletal:         General: Normal range of motion. Cervical back: Normal range of motion. Comments: No edema, erythema, decreased range of motion, rash. Skin:     General: Skin is warm and dry. Capillary Refill: Capillary refill takes less than 2 seconds. Neurological:      General: No focal deficit present. Mental Status: She is alert and oriented to person, place, and time. Psychiatric:         Mood and Affect: Mood normal.         Behavior: Behavior normal.       DIAGNOSTIC RESULTS   LABS:    Labs Reviewed - No data to display    When ordered only abnormal lab results are displayed. All other labs were within normal range or not returned as of this dictation. EKG: When ordered, EKG's are interpreted by the Emergency Department Physician in the absence of a cardiologist.  Please see their note for interpretation of EKG. RADIOLOGY:   Non-plain film images such as CT, Ultrasound and MRI are read by the radiologist. Plain radiographic images are visualized and preliminarily interpreted by the ED Provider with the below findings:        Interpretation per the Radiologist below, if available at the time of this note:    No orders to display     No results found.         PROCEDURES   Unless otherwise noted below, none     Procedures    CRITICAL CARE TIME       CONSULTS:  None      EMERGENCY DEPARTMENT COURSE and DIFFERENTIAL DIAGNOSIS/MDM:   Vitals:    Vitals:    10/10/22 1943   BP: (!) 174/93   Pulse: 88   Resp: 18   Temp: 98.3 °F (36.8 °C)   TempSrc: Oral   SpO2: 95%   Weight: 260 lb (117.9 kg)

## 2022-10-12 DIAGNOSIS — R10.30 LOWER ABDOMINAL PAIN: Primary | ICD-10-CM

## 2023-01-09 ENCOUNTER — TELEPHONE (OUTPATIENT)
Dept: ENT CLINIC | Age: 28
End: 2023-01-09

## 2023-01-09 NOTE — TELEPHONE ENCOUNTER
Routing to Dr. Farrah Berumen, please advise. Patient has not been seen for nose bleeds, would you like to see her in person to discuss?

## 2023-01-09 NOTE — TELEPHONE ENCOUNTER
Patient stated she had 3 nosebleeds within 4 hours last night. Nose is burning yesterday and today. Patient stating she is also having headaches and wanting advice if this is \"normal\".

## 2023-01-09 NOTE — TELEPHONE ENCOUNTER
Call placed to patient and informed her of the message from Dr. Lisa Cruz, patient expressed understanding.

## 2023-03-22 DIAGNOSIS — R10.30 LOWER ABDOMINAL PAIN: Primary | ICD-10-CM

## 2023-03-31 ENCOUNTER — HOSPITAL ENCOUNTER (OUTPATIENT)
Dept: CT IMAGING | Age: 28
Discharge: HOME | End: 2023-03-31
Payer: COMMERCIAL

## 2023-03-31 ENCOUNTER — HOSPITAL ENCOUNTER (OUTPATIENT)
Age: 28
Discharge: HOME | End: 2023-03-31
Payer: COMMERCIAL

## 2023-03-31 DIAGNOSIS — R10.30 LOWER ABDOMINAL PAIN: ICD-10-CM

## 2023-03-31 PROCEDURE — 6360000004 HC RX CONTRAST MEDICATION: Performed by: INTERNAL MEDICINE

## 2023-03-31 PROCEDURE — 74177 CT ABD & PELVIS W/CONTRAST: CPT

## 2023-03-31 RX ADMIN — IOPAMIDOL 75 ML: 755 INJECTION, SOLUTION INTRAVENOUS at 11:33

## 2023-03-31 RX ADMIN — DIATRIZOATE MEGLUMINE AND DIATRIZOATE SODIUM 12 ML: 660; 100 LIQUID ORAL; RECTAL at 11:33

## 2023-05-01 ASSESSMENT — ENCOUNTER SYMPTOMS
VOICE CHANGE: 1
NAUSEA: 0
COUGH: 0
RHINORRHEA: 0
SORE THROAT: 1
VOMITING: 0
SHORTNESS OF BREATH: 0
EYE PAIN: 0

## 2023-05-01 NOTE — PROGRESS NOTES
Veronica      Patient Name: Karuna [facility] Record Number:  [id number]  Primary Care Physician:  No primary care provider on file. Date of Consultation: 5/2/2023    Chief Complaint:   Chief Complaint   Patient presents with    Follow-up     Patient states that she has been here before for the same issues, states that she feels like something is stuck in her throat and her throat and tongue hurt. States pain feels dep. Sounds like she has croup. States she gets a burning sensation and cant eat without feeling choked       HISTORY OF PRESENT ILLNESS  Lyla Chilel is a(n) 34 y.o. female who presents with pharyngitis. She was seen in the emergency department and told to follow up with ENT. No signs of infection were present at the time of evaluation on 7/31/2022. Prior to being seen in the ER she was seen by her primary care and diagnosed with a sinus infection and strep throat and given Augmentin. The sore throat has persisted however the sinus symptoms have improved. She also has some hoarseness at the end of the day. She was taking pantoprazole 40 mg last year however has stopped and has not resumed. She does have occasional reflux symptoms. Interval History 8/25/2022  Suki Ordonez took the doxycycline with some improvement in her sore throat. She is also taking her pantoprazole as prescribed. Within the last week she has began having severe stomach pain with vomiting. Her GI physician ordered a CT scan of her abdomen. She continues to have a sore throat with feeling of something being stuck down by her larynx. Interval History 5/2/2023  Lyla Chilel has not had significant improvement with her throat since last being seen. She tends to have globus sensation and spite of omeprazole 20 mg morning. She also taking Zyrtec for her allergies.     Patient Active Problem List   Diagnosis    High-risk pregnancy    Bilateral polycystic ovarian syndrome

## 2023-05-02 ENCOUNTER — OFFICE VISIT (OUTPATIENT)
Dept: ENT CLINIC | Age: 28
End: 2023-05-02
Payer: COMMERCIAL

## 2023-05-02 VITALS — BODY MASS INDEX: 43.32 KG/M2 | WEIGHT: 260 LBS | HEIGHT: 65 IN | TEMPERATURE: 98.1 F | RESPIRATION RATE: 16 BRPM

## 2023-05-02 DIAGNOSIS — J30.2 SEASONAL ALLERGIES: ICD-10-CM

## 2023-05-02 DIAGNOSIS — K21.9 LARYNGOPHARYNGEAL REFLUX (LPR): Primary | ICD-10-CM

## 2023-05-02 PROCEDURE — 99214 OFFICE O/P EST MOD 30 MIN: CPT | Performed by: STUDENT IN AN ORGANIZED HEALTH CARE EDUCATION/TRAINING PROGRAM

## 2023-05-02 PROCEDURE — G8427 DOCREV CUR MEDS BY ELIG CLIN: HCPCS | Performed by: STUDENT IN AN ORGANIZED HEALTH CARE EDUCATION/TRAINING PROGRAM

## 2023-05-02 PROCEDURE — 1036F TOBACCO NON-USER: CPT | Performed by: STUDENT IN AN ORGANIZED HEALTH CARE EDUCATION/TRAINING PROGRAM

## 2023-05-02 PROCEDURE — G8417 CALC BMI ABV UP PARAM F/U: HCPCS | Performed by: STUDENT IN AN ORGANIZED HEALTH CARE EDUCATION/TRAINING PROGRAM

## 2023-05-02 RX ORDER — LURASIDONE HYDROCHLORIDE 40 MG/1
TABLET, FILM COATED ORAL
COMMUNITY
Start: 2023-04-28

## 2023-05-02 RX ORDER — DULAGLUTIDE 0.75 MG/.5ML
INJECTION, SOLUTION SUBCUTANEOUS
COMMUNITY
Start: 2023-04-10

## 2023-05-02 RX ORDER — OMEPRAZOLE 40 MG/1
40 CAPSULE, DELAYED RELEASE ORAL
Qty: 90 CAPSULE | Refills: 1 | Status: SHIPPED | OUTPATIENT
Start: 2023-05-02

## 2023-10-14 ENCOUNTER — APPOINTMENT (OUTPATIENT)
Dept: CT IMAGING | Age: 28
End: 2023-10-14
Payer: COMMERCIAL

## 2023-10-14 ENCOUNTER — APPOINTMENT (OUTPATIENT)
Dept: ULTRASOUND IMAGING | Age: 28
End: 2023-10-14
Payer: COMMERCIAL

## 2023-10-14 ENCOUNTER — HOSPITAL ENCOUNTER (EMERGENCY)
Age: 28
Discharge: HOME | End: 2023-10-15
Payer: COMMERCIAL

## 2023-10-14 VITALS
SYSTOLIC BLOOD PRESSURE: 123 MMHG | TEMPERATURE: 97.1 F | BODY MASS INDEX: 38.57 KG/M2 | OXYGEN SATURATION: 99 % | HEIGHT: 66 IN | WEIGHT: 240 LBS | HEART RATE: 83 BPM | RESPIRATION RATE: 16 BRPM | DIASTOLIC BLOOD PRESSURE: 61 MMHG

## 2023-10-14 DIAGNOSIS — R10.30 LOWER ABDOMINAL PAIN: Primary | ICD-10-CM

## 2023-10-14 LAB
ALBUMIN SERPL-MCNC: 4.4 G/DL (ref 3.4–5)
ALBUMIN/GLOB SERPL: 1.6 {RATIO} (ref 1.1–2.2)
ALP SERPL-CCNC: 118 U/L (ref 40–129)
ALT SERPL-CCNC: 20 U/L (ref 10–40)
ANION GAP SERPL CALCULATED.3IONS-SCNC: 12 MMOL/L (ref 3–16)
AST SERPL-CCNC: 15 U/L (ref 15–37)
B-HCG SERPL EIA 3RD IS-ACNC: <5 MIU/ML
BACTERIA URNS QL MICRO: ABNORMAL /HPF
BASOPHILS # BLD: 0.1 K/UL (ref 0–0.2)
BASOPHILS NFR BLD: 1.4 %
BILIRUB SERPL-MCNC: <0.2 MG/DL (ref 0–1)
BILIRUB UR QL STRIP.AUTO: NEGATIVE
BUN SERPL-MCNC: 9 MG/DL (ref 7–20)
CALCIUM SERPL-MCNC: 9.7 MG/DL (ref 8.3–10.6)
CHLORIDE SERPL-SCNC: 102 MMOL/L (ref 99–110)
CLARITY UR: CLEAR
CO2 SERPL-SCNC: 25 MMOL/L (ref 21–32)
COLOR UR: YELLOW
CREAT SERPL-MCNC: 0.6 MG/DL (ref 0.6–1.1)
CRYSTALS URNS MICRO: ABNORMAL /HPF
DEPRECATED RDW RBC AUTO: 15 % (ref 12.4–15.4)
EOSINOPHIL # BLD: 0.3 K/UL (ref 0–0.6)
EOSINOPHIL NFR BLD: 3.1 %
EPI CELLS #/AREA URNS HPF: ABNORMAL /HPF (ref 0–5)
GFR SERPLBLD CREATININE-BSD FMLA CKD-EPI: >60 ML/MIN/{1.73_M2}
GLUCOSE SERPL-MCNC: 101 MG/DL (ref 70–99)
GLUCOSE UR STRIP.AUTO-MCNC: NEGATIVE MG/DL
HCT VFR BLD AUTO: 40.1 % (ref 36–48)
HGB BLD-MCNC: 13.6 G/DL (ref 12–16)
HGB UR QL STRIP.AUTO: ABNORMAL
KETONES UR STRIP.AUTO-MCNC: NEGATIVE MG/DL
LEUKOCYTE ESTERASE UR QL STRIP.AUTO: NEGATIVE
LIPASE SERPL-CCNC: 34 U/L (ref 13–60)
LYMPHOCYTES # BLD: 2.6 K/UL (ref 1–5.1)
LYMPHOCYTES NFR BLD: 27.2 %
MCH RBC QN AUTO: 29.2 PG (ref 26–34)
MCHC RBC AUTO-ENTMCNC: 33.9 G/DL (ref 31–36)
MCV RBC AUTO: 86 FL (ref 80–100)
MONOCYTES # BLD: 0.6 K/UL (ref 0–1.3)
MONOCYTES NFR BLD: 6.4 %
NEUTROPHILS # BLD: 6 K/UL (ref 1.7–7.7)
NEUTROPHILS NFR BLD: 61.9 %
NITRITE UR QL STRIP.AUTO: NEGATIVE
PH UR STRIP.AUTO: 5.5 [PH] (ref 5–8)
PLATELET # BLD AUTO: 202 K/UL (ref 135–450)
PMV BLD AUTO: 8.8 FL (ref 5–10.5)
POTASSIUM SERPL-SCNC: 3.8 MMOL/L (ref 3.5–5.1)
PROT SERPL-MCNC: 7.1 G/DL (ref 6.4–8.2)
PROT UR STRIP.AUTO-MCNC: NEGATIVE MG/DL
RBC # BLD AUTO: 4.67 M/UL (ref 4–5.2)
RBC #/AREA URNS HPF: ABNORMAL /HPF (ref 0–4)
SODIUM SERPL-SCNC: 139 MMOL/L (ref 136–145)
SP GR UR STRIP.AUTO: >=1.03 (ref 1–1.03)
UA COMPLETE W REFLEX CULTURE PNL UR: ABNORMAL
UA DIPSTICK W REFLEX MICRO PNL UR: YES
URN SPEC COLLECT METH UR: ABNORMAL
UROBILINOGEN UR STRIP-ACNC: 0.2 E.U./DL
WBC # BLD AUTO: 9.7 K/UL (ref 4–11)
WBC #/AREA URNS HPF: ABNORMAL /HPF (ref 0–5)

## 2023-10-14 PROCEDURE — 74177 CT ABD & PELVIS W/CONTRAST: CPT

## 2023-10-14 PROCEDURE — 85025 COMPLETE CBC W/AUTO DIFF WBC: CPT

## 2023-10-14 PROCEDURE — 2580000003 HC RX 258: Performed by: PHYSICIAN ASSISTANT

## 2023-10-14 PROCEDURE — 76830 TRANSVAGINAL US NON-OB: CPT

## 2023-10-14 PROCEDURE — 99285 EMERGENCY DEPT VISIT HI MDM: CPT

## 2023-10-14 PROCEDURE — 6360000004 HC RX CONTRAST MEDICATION: Performed by: PHYSICIAN ASSISTANT

## 2023-10-14 PROCEDURE — 6370000000 HC RX 637 (ALT 250 FOR IP): Performed by: PHYSICIAN ASSISTANT

## 2023-10-14 PROCEDURE — 81001 URINALYSIS AUTO W/SCOPE: CPT

## 2023-10-14 PROCEDURE — 80053 COMPREHEN METABOLIC PANEL: CPT

## 2023-10-14 PROCEDURE — 96374 THER/PROPH/DIAG INJ IV PUSH: CPT

## 2023-10-14 PROCEDURE — 83690 ASSAY OF LIPASE: CPT

## 2023-10-14 PROCEDURE — 84702 CHORIONIC GONADOTROPIN TEST: CPT

## 2023-10-14 PROCEDURE — 6360000002 HC RX W HCPCS: Performed by: PHYSICIAN ASSISTANT

## 2023-10-14 RX ORDER — 0.9 % SODIUM CHLORIDE 0.9 %
1000 INTRAVENOUS SOLUTION INTRAVENOUS ONCE
Status: COMPLETED | OUTPATIENT
Start: 2023-10-14 | End: 2023-10-14

## 2023-10-14 RX ORDER — KETOROLAC TROMETHAMINE 30 MG/ML
30 INJECTION, SOLUTION INTRAMUSCULAR; INTRAVENOUS ONCE
Status: COMPLETED | OUTPATIENT
Start: 2023-10-15 | End: 2023-10-14

## 2023-10-14 RX ADMIN — KETOROLAC TROMETHAMINE 30 MG: 30 INJECTION, SOLUTION INTRAMUSCULAR; INTRAVENOUS at 23:54

## 2023-10-14 RX ADMIN — IOPAMIDOL 75 ML: 755 INJECTION, SOLUTION INTRAVENOUS at 22:19

## 2023-10-14 RX ADMIN — ALUMINUM HYDROXIDE, MAGNESIUM HYDROXIDE, AND SIMETHICONE: 200; 200; 20 SUSPENSION ORAL at 21:32

## 2023-10-14 RX ADMIN — SODIUM CHLORIDE 1000 ML: 9 INJECTION, SOLUTION INTRAVENOUS at 21:33

## 2023-10-14 ASSESSMENT — PAIN SCALES - GENERAL
PAINLEVEL_OUTOF10: 4
PAINLEVEL_OUTOF10: 4
PAINLEVEL_OUTOF10: 6

## 2023-10-14 ASSESSMENT — PAIN - FUNCTIONAL ASSESSMENT
PAIN_FUNCTIONAL_ASSESSMENT: 0-10
PAIN_FUNCTIONAL_ASSESSMENT: 0-10

## 2023-10-14 ASSESSMENT — PAIN DESCRIPTION - LOCATION: LOCATION: ABDOMEN

## 2023-10-14 ASSESSMENT — PAIN DESCRIPTION - ORIENTATION: ORIENTATION: LOWER;MID

## 2023-10-15 ASSESSMENT — PAIN DESCRIPTION - LOCATION: LOCATION: ABDOMEN

## 2023-10-15 ASSESSMENT — PAIN SCALES - GENERAL: PAINLEVEL_OUTOF10: 2

## 2023-10-16 NOTE — ED PROVIDER NOTES
[facility] Emergency Department    CHIEF COMPLAINT  Abdominal Pain (Mid lower abd pain. UA -. Has rx for Macrobid but isn't taking it yet. Now has abd swelling. Nausea \"off and on today. \" 6/10. BM 10-14-23 AM. )      SHARED SERVICE VISIT  Evaluated by CLARA. My supervising physician was available for consultation. HISTORY OF PRESENT ILLNESS  Luis F Wen is a 34 y.o. female who presents to the ED complaining of lower abdominal pain as well as nausea and vomiting. She says she has been experiencing mid to lower abdominal pain as well as on again off again nausea since today. She states she was initially evaluated by urgent care with concerns for UTI and was given prescription for Macrobid. She says she has not started taking her Macrobid yet. Her last bowel movement was this this morning. She denies any headache, body ache, fevers or chills. She denies any coughing or sneezing. She denies any sore throat or congestion. She denies any vision changes or dizziness. She denies any chest pain, shortness of breath, or dyspnea on exertion. She denies any vomiting. She denies any new onset back pain. She denies any diarrhea or bloody stools. She denies any recent travel or sick contacts. No other complaints, modifying factors or associated symptoms. Nursing notes reviewed.    Past Medical History:   Diagnosis Date    Anxiety     Asthma     Back pain     Depression     Fatty liver disease, nonalcoholic     Headache     Ovarian torsion     PCOS (polycystic ovarian syndrome)      Past Surgical History:   Procedure Laterality Date    CYST REMOVAL      TONSILLECTOMY AND ADENOIDECTOMY       Family History   Problem Relation Age of Onset    Substance Abuse Mother     Asthma Mother     Substance Abuse Father     Heart Attack Maternal Grandmother     Heart Disease Maternal Grandmother     High Blood Pressure Maternal Grandmother     No Known Problems Maternal Grandfather     High

## 2023-10-25 ENCOUNTER — HOSPITAL ENCOUNTER (OUTPATIENT)
Dept: ULTRASOUND IMAGING | Age: 28
Discharge: HOME | End: 2023-10-25
Attending: INTERNAL MEDICINE
Payer: COMMERCIAL

## 2023-10-25 DIAGNOSIS — R10.11 RIGHT UPPER QUADRANT PAIN: ICD-10-CM

## 2023-10-25 DIAGNOSIS — R10.11 RIGHT UPPER QUADRANT PAIN: Primary | ICD-10-CM

## 2023-10-25 PROCEDURE — 76705 ECHO EXAM OF ABDOMEN: CPT

## 2023-11-02 DIAGNOSIS — R10.11 RIGHT UPPER QUADRANT ABDOMINAL PAIN: Primary | ICD-10-CM

## 2023-11-08 ENCOUNTER — HOSPITAL ENCOUNTER (OUTPATIENT)
Dept: NUCLEAR MEDICINE | Age: 28
Discharge: HOME | End: 2023-11-08
Attending: INTERNAL MEDICINE
Payer: COMMERCIAL

## 2023-11-08 VITALS — BODY MASS INDEX: 38.58 KG/M2 | WEIGHT: 240.08 LBS | HEIGHT: 66 IN

## 2023-11-08 DIAGNOSIS — R10.11 RIGHT UPPER QUADRANT ABDOMINAL PAIN: ICD-10-CM

## 2023-11-08 PROCEDURE — 3430000000 HC RX DIAGNOSTIC RADIOPHARMACEUTICAL: Performed by: INTERNAL MEDICINE

## 2023-11-08 PROCEDURE — 78227 HEPATOBIL SYST IMAGE W/DRUG: CPT

## 2023-11-08 PROCEDURE — A9537 TC99M MEBROFENIN: HCPCS | Performed by: INTERNAL MEDICINE

## 2023-11-08 PROCEDURE — 6360000004 HC RX CONTRAST MEDICATION: Performed by: INTERNAL MEDICINE

## 2023-11-08 PROCEDURE — 2580000003 HC RX 258: Performed by: INTERNAL MEDICINE

## 2023-11-08 RX ADMIN — Medication 6.3 MILLICURIE: at 10:00

## 2023-11-08 RX ADMIN — SODIUM CHLORIDE 2.18 MCG: 9 INJECTION, SOLUTION INTRAVENOUS at 11:00

## 2023-11-13 NOTE — PROGRESS NOTES
Clarnce Gu    Age 34 y.o.    female    1995    MRN [id number]    11/20/2023  Arrival Time_____________  OR Time____________[address]     Procedure(s):  ESOPHAGOGASTRODUODENOSCOPY                      General    Surgeon(s):  Savannah Coltonbirdie, MD       Phone [phone] ([place])     [facility]  [facility]         Work  _____________________________________________________________________  _____________________________________________________________________  _____________________________________________________________________  _____________________________________________________________________  _____________________________________________________________________    PCP _____________________________ Phone_________________     H&P  ________________  Bringing      Chart              Epic      DOS      Called________  EKG ________________   Bringing      Chart              Epic      DOS      Called________  LABS________________   Bringing     Chart              Epic      DOS      Called________  Cardiac Clearance ______ Bringing      Chart              Epic      DOS      Called________  Pulmonary Clearance____ Bringing      Chart              Epic      DOS      Called________    Cardiologist________________________ Phone___________________________  Pulmonologist_______________________Phone___________________________    ? Advance Directives   ? Zoroastrianism concerns / Waiver on Chart            [employer]________________  ? Pre-op Instructions Given [address]          _________________________________  ? Directions to Surgery Center                          _________________________________  ? Transportation Home_______________      __________________________________  ?  Crutches/Walker__________________        __________________________________    ________Pre-op Orders   _______Transcribed    _______Wt.  ________Pharmacy          _______SCD       ______MELLY Verde

## 2023-11-13 NOTE — PROGRESS NOTES
Date and time of surgery :   11/20/23 at 56           Arrival Time:  930     Bring Picture ID and insurance card. Please wear simple, loose fitting clothing to the hospital.   Leartis Cleverly not bring valuables (money, credit cards, checkbooks, etc.)   Do not wear any makeup (including  eye makeup) and no nail polish or artificial nails on your fingers or toes. DO NOT wear any jewelry or piercings on day of surgery. All body piercing jewelry must be removed. If you have dentures, they will be removed before going to the OR; we will provide you a container. If you wear contact lenses or glasses, they will be removed; please bring a case for them. Shower the evening before or morning of surgery with antibacterial soap. Nothing to eat or drink after midnight the day before surgery. You may brush your teeth and gargle the morning of surgery. DO NOT SWALLOW WATER. Do not take any morning meds the day of your surgery. Aspirin, Ibuprofen, Advil, Naproxen, Vitamin E and other Anti-inflammatory products and supplements should be stopped for 5 -7days before surgery or as directed by your physician. Do not smoke or drink any alcoholic beverages 24 hours prior to surgery. This includes NA Beer. Refrain from the usage of any recreational drugs, including non-prescribed prescription drugs. You MUST plan for a responsible adult to stay on site while you are here and take you home after your surgery. You will not be allowed to leave alone or drive yourself home. It is strongly suggested someone stay with you the first 24 hrs. Your surgery will be cancelled if you do not have a ride home. To help prevent infection, change your sheets the night before surgery. If you  have a Living Will and Durable Power of  for Healthcare, please bring in a copy. Notify your Surgeon if you develop any illness between now and time of surgery.  Cough, cold, fever, sore throat, nausea, vomiting, etc.  Please notify your surgeon if

## 2023-11-17 RX ORDER — SODIUM CHLORIDE, SODIUM LACTATE, POTASSIUM CHLORIDE, CALCIUM CHLORIDE 600; 310; 30; 20 MG/100ML; MG/100ML; MG/100ML; MG/100ML
INJECTION, SOLUTION INTRAVENOUS CONTINUOUS
Status: CANCELLED | OUTPATIENT
Start: 2023-11-17

## 2023-11-17 RX ORDER — SODIUM CHLORIDE 9 MG/ML
INJECTION, SOLUTION INTRAVENOUS PRN
Status: CANCELLED | OUTPATIENT
Start: 2023-11-17

## 2023-11-17 RX ORDER — SODIUM CHLORIDE 0.9 % (FLUSH) 0.9 %
5-40 SYRINGE (ML) INJECTION EVERY 12 HOURS SCHEDULED
Status: CANCELLED | OUTPATIENT
Start: 2023-11-17

## 2023-11-17 RX ORDER — SODIUM CHLORIDE 0.9 % (FLUSH) 0.9 %
5-40 SYRINGE (ML) INJECTION PRN
Status: CANCELLED | OUTPATIENT
Start: 2023-11-17

## 2023-11-17 RX ORDER — LIDOCAINE HYDROCHLORIDE 10 MG/ML
1 INJECTION, SOLUTION EPIDURAL; INFILTRATION; INTRACAUDAL; PERINEURAL
Status: CANCELLED | OUTPATIENT
Start: 2023-11-17 | End: 2023-11-18

## 2023-11-20 ENCOUNTER — ANESTHESIA EVENT (OUTPATIENT)
Dept: ENDOSCOPY | Age: 28
End: 2023-11-20
Payer: COMMERCIAL

## 2023-11-20 ENCOUNTER — HOSPITAL ENCOUNTER (OUTPATIENT)
Age: 28
Setting detail: OUTPATIENT SURGERY
Discharge: HOME | End: 2023-11-20
Attending: INTERNAL MEDICINE | Admitting: INTERNAL MEDICINE
Payer: COMMERCIAL

## 2023-11-20 ENCOUNTER — ANESTHESIA (OUTPATIENT)
Dept: ENDOSCOPY | Age: 28
End: 2023-11-20
Payer: COMMERCIAL

## 2023-11-20 VITALS
RESPIRATION RATE: 18 BRPM | BODY MASS INDEX: 39.37 KG/M2 | TEMPERATURE: 96.8 F | DIASTOLIC BLOOD PRESSURE: 74 MMHG | HEART RATE: 89 BPM | OXYGEN SATURATION: 97 % | WEIGHT: 245 LBS | HEIGHT: 66 IN | SYSTOLIC BLOOD PRESSURE: 113 MMHG

## 2023-11-20 DIAGNOSIS — R10.13 EPIGASTRIC PAIN: ICD-10-CM

## 2023-11-20 DIAGNOSIS — R10.11 RUQ PAIN: ICD-10-CM

## 2023-11-20 LAB — HCG UR QL: NEGATIVE

## 2023-11-20 PROCEDURE — 88305 TISSUE EXAM BY PATHOLOGIST: CPT

## 2023-11-20 PROCEDURE — 2709999900 HC NON-CHARGEABLE SUPPLY: Performed by: INTERNAL MEDICINE

## 2023-11-20 PROCEDURE — 7100000010 HC PHASE II RECOVERY - FIRST 15 MIN: Performed by: INTERNAL MEDICINE

## 2023-11-20 PROCEDURE — 84703 CHORIONIC GONADOTROPIN ASSAY: CPT

## 2023-11-20 PROCEDURE — 7100000011 HC PHASE II RECOVERY - ADDTL 15 MIN: Performed by: INTERNAL MEDICINE

## 2023-11-20 PROCEDURE — 3700000000 HC ANESTHESIA ATTENDED CARE: Performed by: INTERNAL MEDICINE

## 2023-11-20 PROCEDURE — 3609012400 HC EGD TRANSORAL BIOPSY SINGLE/MULTIPLE: Performed by: INTERNAL MEDICINE

## 2023-11-20 PROCEDURE — 6360000002 HC RX W HCPCS: Performed by: NURSE ANESTHETIST, CERTIFIED REGISTERED

## 2023-11-20 RX ORDER — ONDANSETRON 2 MG/ML
4 INJECTION INTRAMUSCULAR; INTRAVENOUS
Status: CANCELLED | OUTPATIENT
Start: 2023-11-20 | End: 2023-11-21

## 2023-11-20 RX ORDER — SODIUM CHLORIDE 0.9 % (FLUSH) 0.9 %
5-40 SYRINGE (ML) INJECTION PRN
Status: CANCELLED | OUTPATIENT
Start: 2023-11-20

## 2023-11-20 RX ORDER — PROPOFOL 10 MG/ML
INJECTION, EMULSION INTRAVENOUS PRN
Status: DISCONTINUED | OUTPATIENT
Start: 2023-11-20 | End: 2023-11-20 | Stop reason: SDUPTHER

## 2023-11-20 RX ORDER — IPRATROPIUM BROMIDE AND ALBUTEROL SULFATE 2.5; .5 MG/3ML; MG/3ML
1 SOLUTION RESPIRATORY (INHALATION)
Status: CANCELLED | OUTPATIENT
Start: 2023-11-20 | End: 2023-11-21

## 2023-11-20 RX ORDER — SODIUM CHLORIDE 0.9 % (FLUSH) 0.9 %
5-40 SYRINGE (ML) INJECTION EVERY 12 HOURS SCHEDULED
Status: CANCELLED | OUTPATIENT
Start: 2023-11-20

## 2023-11-20 RX ORDER — SODIUM CHLORIDE 9 MG/ML
INJECTION, SOLUTION INTRAVENOUS PRN
Status: CANCELLED | OUTPATIENT
Start: 2023-11-20

## 2023-11-20 RX ADMIN — PROPOFOL 50 MG: 10 INJECTION, EMULSION INTRAVENOUS at 09:55

## 2023-11-20 RX ADMIN — PROPOFOL 50 MG: 10 INJECTION, EMULSION INTRAVENOUS at 09:53

## 2023-11-20 RX ADMIN — PROPOFOL 150 MG: 10 INJECTION, EMULSION INTRAVENOUS at 09:50

## 2023-11-20 ASSESSMENT — PAIN SCALES - GENERAL: PAINLEVEL_OUTOF10: 0

## 2023-11-20 NOTE — OP NOTE
observation. A gastroscope was inserted into the mouth and advanced under direct vision to second portion of the duodenum. A careful inspection was made as the gastroscope was withdrawn, including a retroflexed view of the proximal stomach including views of the incisura and cardia; findings and interventions are described below. Appropriate photodocumentation Was Obtained. If photos taken, they were ordered to be scanned into the medical record. Findings:  Normal upper, mid and lower esophagus  Irregular Z-line at 35 cm from incisors  Mild erythematous mucosa in antrum and body of stomach suggestive of mild gastritis. A few linear erosions and superficial ulcers in the antrum. Biopsies taken to evaluate for H. Pylori. Normal duodenal bulb and second portion of duodenum. Biopsies taken to evaluate for Celiac disease. Specimens: Was Obtained:     Complications:   None; patient tolerated the procedure well. Disposition:   PACU - hemodynamically stable. Estimated Blood loss:  none    Impression:   Normal esophagus  Irregular Z-line at 35 cm from incisors  Mild gastritis. A few linear erosions and superficial ulcers in the antrum. Biopsied. Normal duodenal bulb and second portion of duodenum. Biopsied    Recommendations:  -Continue Omeprazole 40mg orally daily, 30 minutes before breakfast   -Await pathology. ,   -Avoid NSAIDs including motrin, aleve, ibuprofen  -Follow up with me as needed        Michael Kline MD   [facility]  11/20/23 9:56 AM EST    Please note that some or all of this record was generated using voice recognition software. If there are any questions about the content of this document, please contact the author as some errors in translation may have occurred.

## 2023-11-20 NOTE — ANESTHESIA POSTPROCEDURE EVALUATION
Department of Anesthesiology  Postprocedure Note    Patient: Luis F Wen  MRN: [id number]  YOB: 1995  Date of evaluation: 11/20/2023      Procedure Summary       Date: 11/20/23 Room / Location: [address]    Anesthesia Start: 8474 Anesthesia Stop: 6543    Procedure: EGD BIOPSY Diagnosis:       Epigastric pain      RUQ pain      (Epigastric pain [R10.13])      (RUQ pain [R10.11])    Surgeons: Sheela Sutherland MD Responsible Provider: Iman Mendez MD    Anesthesia Type: MAC ASA Status: 2            Anesthesia Type: No value filed.     Abner Phase I: Abner Score: 10    Abner Phase II: Abner Score: 10      Anesthesia Post Evaluation    Patient location during evaluation: PACU  Patient participation: complete - patient participated  Level of consciousness: awake and alert  Airway patency: patent  Nausea & Vomiting: no nausea and no vomiting  Complications: no  Cardiovascular status: hemodynamically stable  Respiratory status: acceptable  Hydration status: euvolemic  Pain management: adequate

## 2023-11-20 NOTE — DISCHARGE INSTRUCTIONS
PATIENT INSTRUCTIONS  POST-SEDATION        IMMEDIATELY FOLLOWING PROCEDURE:    Do not drive or operate machinery for the first twenty four hours after surgery. Do not make any important decisions for twenty four hours after surgery or while taking narcotic pain medications or sedatives. You should NOT BE LEFT UNATTENDED OR ALONE. A responsible adult should be with you for the rest of the day of your procedure and also during the night for your protection and safety. You may experience some light headedness. Rest at home with activity as tolerated. You may not need to go to bed, but it is important to rest for the next 24 hours. You should not engage in athletic sports such as basketball, volleyball, jogging, skating, or activities requiring refined motor skills for 24 hours. If you develop intractable nausea and vomiting or a severe headache please notify your doctor immediately. You are not expected to have any fever, but if you feel warm, take your temperature. If you have a fever 101 degrees or higher, call your doctor. If you have had an Endoscopy:   *Eat lightly for your first meal and gradually resume your normal / prescribed diet. DO NOT eat or drink until your gag reflex returns. *If you have a sore throat you may use lozenges, or salt water gargles. ONCE YOU ARE HOME, IF YOU SHOULD HAVE:  Difficulty in breathing, persistent nausea or vomiting, bleeding you feel is excessive, or pain that is unusual, increased abdominal bloating, or any swelling, fever / chills, call your physician. If you cannot contact your physician, but feel that your signs and symptoms need a physician's attention, go to the Emergency Department. FOLLOW-UP:    Please follow up with your Primary Care Provider as scheduled or needed. Call Dr. Valeri Grayson MD if there are any GI concerns. [phone]      You may be receiving a follow up phone call to ask about your care.          Upper GI Endoscopy:

## 2023-11-20 NOTE — H&P
[place]   Pre-operative History and Physical    Patient: Nicol Record  : 1995  Acct#:     HISTORY OF PRESENT ILLNESS:    Indications: Abdominal pain    -34year old female with medical history of morbid obesity (BMI 38.7), bilateral PCOS, endometriosis, GERD and NAFLD follows up after an ED visit to [facility] on 10/14/2023 for abdominal pain. Patient was seen for lower abdominal pain, associated with nausea and vomiting. Workup noted blood in urine with calcium oxalate crystals. CT abdomen was unremarkable except questionable mild sigmoid colitis. Suspected to have passed a kidney stone. At present she reports intermittent RUQ/epigastric pain of about 2 months duration. Described as 'twinges' and radiates to the right chest. Denies nausea, vomiting, fever, chills,melena or hematochezia. Labs on 10/14/2023 reviewed with unremarkable BMP, BUN 9, creatinine 0.6. Unremarkable CBC with WBC 9.7, hemoglobin 13.6, hematocrit 40.1, platelets 802. Normal liver test, lipase 34. UA noted trace blood, 1+ bacteria and 3+ calcium oxalate crystals. Urine pregnancy test negative. CT abdomen and pelvis with IV contrast on 10/14/2023 noted mild fat stranding around the sigmoid colon without identifiable wall thickening, possible mild underlying colitis. Transvaginal ultrasound and 10/14/2023 noted no acute abnormality. Appropriate positioning of IUD. HIDA scan 23: Gallbladder ejection fraction is 76%. No acute cholecystitis. FibroScan on 2021 noted severe fatty liver (S3) with no fibrosis, metavir score F0 (5.7kPa).     Past Medical History:        Diagnosis Date    Anxiety     Asthma     Back pain     Depression     Fatty liver disease, nonalcoholic     Headache     Insulin resistance     Ovarian torsion     PCOS (polycystic ovarian syndrome)       Past Surgical History:        Procedure Laterality Date    CYST REMOVAL      ovarian    ENDOMETRIAL ABLATION      TONSILLECTOMY

## 2023-11-20 NOTE — PROGRESS NOTES
1002 Received pt from endo in stable condition. VSS. 1010 Pt tolerating po intake. MD in to discuss results with pt. following procedure. D/C instructions reviewed with patient/family, all questions and concerns answered. Ok to D/C, IV removed   1038 Patient escorted to car.

## 2023-12-27 ENCOUNTER — HOSPITAL ENCOUNTER (OUTPATIENT)
Dept: CT IMAGING | Age: 28
Discharge: HOME | End: 2023-12-27
Payer: COMMERCIAL

## 2023-12-27 DIAGNOSIS — Z87.442 PERSONAL HISTORY OF URINARY CALCULI: ICD-10-CM

## 2023-12-27 DIAGNOSIS — N13.30 HYDRONEPHROSIS, UNSPECIFIED HYDRONEPHROSIS TYPE: ICD-10-CM

## 2023-12-27 PROCEDURE — 6360000004 HC RX CONTRAST MEDICATION: Performed by: UROLOGY

## 2023-12-27 PROCEDURE — 74178 CT ABD&PLV WO CNTR FLWD CNTR: CPT | Performed by: UROLOGY

## 2023-12-27 RX ADMIN — IOPAMIDOL 120 ML: 755 INJECTION, SOLUTION INTRAVENOUS at 11:53

## 2023-12-30 ENCOUNTER — OFFICE VISIT (OUTPATIENT)
Dept: URGENT CARE | Age: 28
End: 2023-12-30

## 2023-12-30 VITALS
HEIGHT: 66 IN | SYSTOLIC BLOOD PRESSURE: 130 MMHG | BODY MASS INDEX: 38.57 KG/M2 | HEART RATE: 107 BPM | TEMPERATURE: 98.6 F | WEIGHT: 240 LBS | RESPIRATION RATE: 17 BRPM | DIASTOLIC BLOOD PRESSURE: 90 MMHG | OXYGEN SATURATION: 95 %

## 2023-12-30 DIAGNOSIS — H66.004 RECURRENT ACUTE SUPPURATIVE OTITIS MEDIA OF RIGHT EAR WITHOUT SPONTANEOUS RUPTURE OF TYMPANIC MEMBRANE: Primary | ICD-10-CM

## 2023-12-30 DIAGNOSIS — J45.20 MILD INTERMITTENT ASTHMATIC BRONCHITIS WITHOUT COMPLICATION: ICD-10-CM

## 2023-12-30 DIAGNOSIS — R03.0 ELEVATED BLOOD PRESSURE READING WITHOUT DIAGNOSIS OF HYPERTENSION: ICD-10-CM

## 2023-12-30 DIAGNOSIS — R09.81 NASAL CONGESTION: ICD-10-CM

## 2023-12-30 DIAGNOSIS — R06.2 WHEEZING: ICD-10-CM

## 2023-12-30 DIAGNOSIS — R05.2 SUBACUTE COUGH: ICD-10-CM

## 2023-12-30 RX ORDER — GUAIFENESIN 600 MG/1
600 TABLET, EXTENDED RELEASE ORAL 2 TIMES DAILY
Qty: 30 TABLET | Refills: 0 | Status: SHIPPED | OUTPATIENT
Start: 2023-12-30 | End: 2024-01-14

## 2023-12-30 RX ORDER — DEXTROMETHORPHAN HBR AND PYRILAMINE MALEATE 30; 30 MG/1; MG/1
1 TABLET ORAL 4 TIMES DAILY PRN
Qty: 40 TABLET | Refills: 0 | Status: SHIPPED | OUTPATIENT
Start: 2023-12-30

## 2023-12-30 RX ORDER — PREDNISONE 20 MG/1
20 TABLET ORAL 2 TIMES DAILY
Qty: 10 TABLET | Refills: 0 | Status: SHIPPED | OUTPATIENT
Start: 2023-12-30 | End: 2024-01-04

## 2023-12-30 RX ORDER — CEFDINIR 300 MG/1
300 CAPSULE ORAL 2 TIMES DAILY
Qty: 20 CAPSULE | Refills: 0 | Status: SHIPPED | OUTPATIENT
Start: 2023-12-30 | End: 2024-01-09

## 2023-12-30 NOTE — PROGRESS NOTES
maxillary sinus tenderness or frontal sinus tenderness. Mouth/Throat:      Mouth: Mucous membranes are moist.      Pharynx: Oropharynx is clear. Uvula midline. Comments: Mucus drainage down the posterior oropharynx  Eyes:      Extraocular Movements: Extraocular movements intact. Conjunctiva/sclera: Conjunctivae normal.      Pupils: Pupils are equal, round, and reactive to light. Cardiovascular:      Rate and Rhythm: Normal rate and regular rhythm. Heart sounds: Normal heart sounds. Pulmonary:      Effort: Pulmonary effort is normal. No accessory muscle usage or respiratory distress. Breath sounds: No stridor. Wheezing (diffuse, bilaterally of upper lung fields) present. No decreased breath sounds, rhonchi or rales. Musculoskeletal:      Cervical back: Normal range of motion and neck supple. Skin:     General: Skin is warm and dry. Neurological:      Mental Status: She is alert and oriented to person, place, and time. Psychiatric:         Attention and Perception: Attention normal.         Behavior: Behavior is cooperative. RESULTS:  No results found for this visit on 12/30/23. An electronic signature was used to authenticate this note.     --Nicole Prado PA-C

## 2023-12-30 NOTE — PATIENT INSTRUCTIONS
times daily for 10 days    DEXTROMETHORPHAN-PYRILAMINE (CAPRON DMT) 30-30 MG TABS    Take 1 tablet by mouth 4 times daily as needed (cough and congestion)    GUAIFENESIN (MUCINEX) 600 MG EXTENDED RELEASE TABLET    Take 1 tablet by mouth 2 times daily for 15 days    PREDNISONE (DELTASONE) 20 MG TABLET    Take 1 tablet by mouth 2 times daily for 5 days

## 2024-01-15 ENCOUNTER — APPOINTMENT (OUTPATIENT)
Dept: CT IMAGING | Age: 29
End: 2024-01-15
Payer: COMMERCIAL

## 2024-01-15 ENCOUNTER — HOSPITAL ENCOUNTER (EMERGENCY)
Age: 29
Discharge: HOME | End: 2024-01-15
Payer: COMMERCIAL

## 2024-01-15 VITALS
SYSTOLIC BLOOD PRESSURE: 108 MMHG | DIASTOLIC BLOOD PRESSURE: 60 MMHG | RESPIRATION RATE: 18 BRPM | WEIGHT: 245 LBS | BODY MASS INDEX: 39.37 KG/M2 | HEIGHT: 66 IN | TEMPERATURE: 97.9 F | OXYGEN SATURATION: 97 % | HEART RATE: 92 BPM

## 2024-01-15 DIAGNOSIS — M79.3 PANNICULITIS: Primary | ICD-10-CM

## 2024-01-15 LAB
ALBUMIN SERPL-MCNC: 4.4 G/DL (ref 3.4–5)
ALBUMIN/GLOB SERPL: 1.9 {RATIO} (ref 1.1–2.2)
ALP SERPL-CCNC: 109 U/L (ref 40–129)
ALT SERPL-CCNC: 16 U/L (ref 10–40)
ANION GAP SERPL CALCULATED.3IONS-SCNC: 12 MMOL/L (ref 3–16)
AST SERPL-CCNC: 15 U/L (ref 15–37)
B-HCG SERPL EIA 3RD IS-ACNC: <5 MIU/ML
BILIRUB SERPL-MCNC: <0.2 MG/DL (ref 0–1)
BUN SERPL-MCNC: 10 MG/DL (ref 7–20)
CALCIUM SERPL-MCNC: 9.6 MG/DL (ref 8.3–10.6)
CHLORIDE SERPL-SCNC: 103 MMOL/L (ref 99–110)
CO2 SERPL-SCNC: 25 MMOL/L (ref 21–32)
CREAT SERPL-MCNC: 0.6 MG/DL (ref 0.6–1.1)
GFR SERPLBLD CREATININE-BSD FMLA CKD-EPI: >60 ML/MIN/{1.73_M2}
GLUCOSE SERPL-MCNC: 115 MG/DL (ref 70–99)
MAGNESIUM SERPL-MCNC: 2.2 MG/DL (ref 1.8–2.4)
POTASSIUM SERPL-SCNC: 3.4 MMOL/L (ref 3.5–5.1)
PROT SERPL-MCNC: 6.7 G/DL (ref 6.4–8.2)
SODIUM SERPL-SCNC: 140 MMOL/L (ref 136–145)

## 2024-01-15 PROCEDURE — 6370000000 HC RX 637 (ALT 250 FOR IP): Performed by: PHYSICIAN ASSISTANT

## 2024-01-15 PROCEDURE — 6360000004 HC RX CONTRAST MEDICATION: Performed by: PHYSICIAN ASSISTANT

## 2024-01-15 PROCEDURE — 83735 ASSAY OF MAGNESIUM: CPT

## 2024-01-15 PROCEDURE — 84702 CHORIONIC GONADOTROPIN TEST: CPT

## 2024-01-15 PROCEDURE — 80053 COMPREHEN METABOLIC PANEL: CPT

## 2024-01-15 PROCEDURE — 99285 EMERGENCY DEPT VISIT HI MDM: CPT

## 2024-01-15 PROCEDURE — 74177 CT ABD & PELVIS W/CONTRAST: CPT

## 2024-01-15 PROCEDURE — 36415 COLL VENOUS BLD VENIPUNCTURE: CPT

## 2024-01-15 RX ORDER — SULFAMETHOXAZOLE AND TRIMETHOPRIM 800; 160 MG/1; MG/1
1 TABLET ORAL 2 TIMES DAILY
Qty: 14 TABLET | Refills: 0 | Status: SHIPPED | OUTPATIENT
Start: 2024-01-15 | End: 2024-01-22

## 2024-01-15 RX ORDER — SULFAMETHOXAZOLE AND TRIMETHOPRIM 800; 160 MG/1; MG/1
1 TABLET ORAL ONCE
Status: COMPLETED | OUTPATIENT
Start: 2024-01-15 | End: 2024-01-15

## 2024-01-15 RX ADMIN — IOPAMIDOL 75 ML: 755 INJECTION, SOLUTION INTRAVENOUS at 22:20

## 2024-01-15 RX ADMIN — SULFAMETHOXAZOLE AND TRIMETHOPRIM 1 TABLET: 800; 160 TABLET ORAL at 22:47

## 2024-01-15 ASSESSMENT — LIFESTYLE VARIABLES
HOW OFTEN DO YOU HAVE A DRINK CONTAINING ALCOHOL: NEVER
HOW MANY STANDARD DRINKS CONTAINING ALCOHOL DO YOU HAVE ON A TYPICAL DAY: PATIENT DOES NOT DRINK

## 2024-01-15 ASSESSMENT — PAIN DESCRIPTION - LOCATION: LOCATION: BUTTOCKS

## 2024-01-15 ASSESSMENT — PAIN DESCRIPTION - DESCRIPTORS: DESCRIPTORS: ACHING

## 2024-01-15 ASSESSMENT — PAIN - FUNCTIONAL ASSESSMENT: PAIN_FUNCTIONAL_ASSESSMENT: 0-10

## 2024-01-15 ASSESSMENT — PAIN DESCRIPTION - ORIENTATION: ORIENTATION: MID

## 2024-01-15 ASSESSMENT — PAIN SCALES - GENERAL: PAINLEVEL_OUTOF10: 8

## 2024-01-16 NOTE — ED PROVIDER NOTES
[facility] Emergency Department    CHIEF COMPLAINT  Abscess (States abscess to tailbone she noticed a few days ago. Pain when sitting )      SHARED SERVICE VISIT  Evaluated by CLARA.  My supervising physician was available for consultation.    HISTORY OF PRESENT ILLNESS  Janna Piper is a 28 y.o. female who presents to the ED complaining of painful area to the buttocks ongoing for the past couple days.  She says that she has been experiencing sharp pain when she sitting down and felt a palpable mass at the gluteal cleft.  Has not noticed any drainage from the general area.  She has tried using Tylenol and warm compresses with no effect.  She has concerns for possible abscess at this time.  Denies any headache, body ache, fevers or chills.  Denies any coughing or sneezing.  Denies any sore throat or congestion.  Denies any vision changes or dizziness.  Denies any chest pain, shortness of breath, dyspnea on exertion.  Denies any nausea, vomiting, or abdominal pain.  Denies any urinary symptoms.  Denies any diarrhea or bloody stools.  Denies any new onset back pain.  Denies any recent travel or sick contacts.  No other complaints, modifying factors or associated symptoms.     Nursing notes reviewed.   Past Medical History:   Diagnosis Date    Anxiety     Asthma     Back pain     Depression     Fatty liver disease, nonalcoholic     Headache     Insulin resistance     Ovarian torsion     PCOS (polycystic ovarian syndrome)      Past Surgical History:   Procedure Laterality Date    CYST REMOVAL      ovarian    ENDOMETRIAL ABLATION      TONSILLECTOMY AND ADENOIDECTOMY      UPPER GASTROINTESTINAL ENDOSCOPY N/A 11/20/2023    EGD BIOPSY performed by Sean Bal MD at [facility] ENDOSCOPY     Family History   Problem Relation Age of Onset    Substance Abuse Mother     Asthma Mother     Substance Abuse Father     Heart Attack Maternal Grandmother     Heart Disease Maternal Grandmother     High

## 2024-04-30 DIAGNOSIS — G89.29 CHRONIC RUQ PAIN: Primary | ICD-10-CM

## 2024-04-30 DIAGNOSIS — R10.11 CHRONIC RUQ PAIN: ICD-10-CM

## 2024-04-30 DIAGNOSIS — G89.29 CHRONIC RUQ PAIN: ICD-10-CM

## 2024-04-30 DIAGNOSIS — R10.11 CHRONIC RUQ PAIN: Primary | ICD-10-CM

## 2024-04-30 LAB
ALBUMIN SERPL-MCNC: 4.7 G/DL (ref 3.4–5)
ALP SERPL-CCNC: 107 U/L (ref 40–129)
ALT SERPL-CCNC: 22 U/L (ref 10–40)
AST SERPL-CCNC: 18 U/L (ref 15–37)
BILIRUB DIRECT SERPL-MCNC: <0.2 MG/DL (ref 0–0.3)
BILIRUB INDIRECT SERPL-MCNC: NORMAL MG/DL (ref 0–1)
BILIRUB SERPL-MCNC: 0.3 MG/DL (ref 0–1)
LIPASE SERPL-CCNC: 43 U/L (ref 13–60)
PROT SERPL-MCNC: 6.7 G/DL (ref 6.4–8.2)

## 2024-05-02 ENCOUNTER — HOSPITAL ENCOUNTER (EMERGENCY)
Age: 29
Discharge: HOME | End: 2024-05-02
Payer: COMMERCIAL

## 2024-05-02 VITALS
OXYGEN SATURATION: 98 % | SYSTOLIC BLOOD PRESSURE: 137 MMHG | RESPIRATION RATE: 19 BRPM | HEIGHT: 66 IN | WEIGHT: 260 LBS | BODY MASS INDEX: 41.78 KG/M2 | TEMPERATURE: 98.9 F | DIASTOLIC BLOOD PRESSURE: 86 MMHG | HEART RATE: 96 BPM

## 2024-05-02 DIAGNOSIS — K08.89 PAIN, DENTAL: Primary | ICD-10-CM

## 2024-05-02 PROCEDURE — 99283 EMERGENCY DEPT VISIT LOW MDM: CPT

## 2024-05-02 RX ORDER — HYDROCODONE BITARTRATE AND ACETAMINOPHEN 5; 325 MG/1; MG/1
1 TABLET ORAL EVERY 6 HOURS PRN
Qty: 12 TABLET | Refills: 0 | Status: SHIPPED | OUTPATIENT
Start: 2024-05-02 | End: 2024-05-02

## 2024-05-02 RX ORDER — HYDROCODONE BITARTRATE AND ACETAMINOPHEN 5; 325 MG/1; MG/1
1 TABLET ORAL EVERY 6 HOURS PRN
Qty: 12 TABLET | Refills: 0 | Status: SHIPPED | OUTPATIENT
Start: 2024-05-02 | End: 2024-05-05

## 2024-05-02 ASSESSMENT — PAIN DESCRIPTION - LOCATION: LOCATION: TEETH

## 2024-05-02 ASSESSMENT — PAIN DESCRIPTION - DESCRIPTORS: DESCRIPTORS: THROBBING

## 2024-05-02 ASSESSMENT — PAIN DESCRIPTION - PAIN TYPE: TYPE: ACUTE PAIN

## 2024-05-02 ASSESSMENT — PAIN - FUNCTIONAL ASSESSMENT: PAIN_FUNCTIONAL_ASSESSMENT: 0-10

## 2024-05-02 ASSESSMENT — PAIN SCALES - GENERAL: PAINLEVEL_OUTOF10: 6

## 2024-05-09 NOTE — ED PROVIDER NOTES
[facility]  ED  EMERGENCY DEPARTMENT ENCOUNTER        Pt Name: Janna Piper  MRN: [id number]  Birthdate 1995  Date of evaluation: 5/2/2024  Provider: TRINI Jolley CNP  PCP: Genoveva Gifford APRN - CNP        CLARA. I have evaluated this patient.        CHIEF COMPLAINT       Chief Complaint   Patient presents with    Dental Pain     Patient currently being treated for dental infection. Started on Augmentin 4 days ago. Swelling and pain getting worse per patient. Appt. with dentist on 5/23.       HISTORY OF PRESENT ILLNESS: 1 or more Elements     History From: the patient  Limitations to history : None    Janna Piper is a 29 y.o. female who presents to the emergency room today with complaints of dental pain.  Patient states that she was started on Augmentin 4 days ago for dental infection, states that the pain is getting worse, she has a dentist appointment on the 23rd.  She is here for further evaluation.    Nursing Notes were all reviewed and agreed with or any disagreements were addressed in the HPI.    REVIEW OF SYSTEMS :      Review of Systems    Positives and Pertinent negatives as per HPI.     SURGICAL HISTORY     Past Surgical History:   Procedure Laterality Date    CYST REMOVAL      ovarian    ENDOMETRIAL ABLATION      TONSILLECTOMY AND ADENOIDECTOMY      UPPER GASTROINTESTINAL ENDOSCOPY N/A 11/20/2023    EGD BIOPSY performed by Sean Bal MD at [facility] ENDOSCOPY       CURRENTMEDICATIONS       Discharge Medication List as of 5/2/2024  6:13 PM        CONTINUE these medications which have NOT CHANGED    Details   Dextromethorphan-Pyrilamine (CAPRON DMT) 30-30 MG TABS Take 1 tablet by mouth 4 times daily as needed (cough and congestion), Disp-40 tablet, R-0Normal      benzonatate (TESSALON) 200 MG capsule TAKE 1 CAPSULE BY MOUTH 3 TIMES PER DAY FOR 14 DAYSHistorical Med      doxycycline monohydrate (MONODOX) 100 MG capsule TAKE 1 CAPSULE BY MOUTH 2 TIMES A DAY

## 2024-05-13 ENCOUNTER — INITIAL CONSULT (OUTPATIENT)
Dept: SURGERY | Age: 29
End: 2024-05-13
Payer: COMMERCIAL

## 2024-05-13 VITALS
WEIGHT: 271.4 LBS | HEIGHT: 66 IN | BODY MASS INDEX: 43.62 KG/M2 | DIASTOLIC BLOOD PRESSURE: 82 MMHG | SYSTOLIC BLOOD PRESSURE: 124 MMHG

## 2024-05-13 DIAGNOSIS — K80.20 SYMPTOMATIC CHOLELITHIASIS: Primary | ICD-10-CM

## 2024-05-13 PROCEDURE — G8427 DOCREV CUR MEDS BY ELIG CLIN: HCPCS | Performed by: SURGERY

## 2024-05-13 PROCEDURE — 99244 OFF/OP CNSLTJ NEW/EST MOD 40: CPT | Performed by: SURGERY

## 2024-05-13 PROCEDURE — G8417 CALC BMI ABV UP PARAM F/U: HCPCS | Performed by: SURGERY

## 2024-05-14 NOTE — PROGRESS NOTES
Department of General Surgery Consult    PATIENT NAME: Janna Piper   YOB: 1995    ADMISSION DATE: No admission date for patient encounter.      TODAY'S DATE: 5/13/24    Reason for Consult:  symptomatic cholelithiasis    Chief Complaint: RUQ pain    Historian: patient    Requesting Physician:  Sean Bal    HISTORY OF PRESENT ILLNESS:              The patient is a 29 y.o. female who presents with recurrent RUQ pain.  Worse in the evening.  Some nausea and emesis. No fevers or chills. Occurring for past few months but also occurred with prior pregnancy..    Past Medical History:        Diagnosis Date    Anxiety     Asthma     Back pain     Depression     Fatty liver disease, nonalcoholic     Headache     Insulin resistance     Ovarian torsion     PCOS (polycystic ovarian syndrome)        Past Surgical History:        Procedure Laterality Date    CYST REMOVAL      ovarian    ENDOMETRIAL ABLATION      TONSILLECTOMY AND ADENOIDECTOMY      UPPER GASTROINTESTINAL ENDOSCOPY N/A 11/20/2023    EGD BIOPSY performed by Sean Bal MD at [facility] ENDOSCOPY       Current Medications:   No current facility-administered medications for this visit.  Prior to Admission medications    Medication Sig Start Date End Date Taking? Authorizing Provider   Dextromethorphan-Pyrilamine (CAPRON DMT) 30-30 MG TABS Take 1 tablet by mouth 4 times daily as needed (cough and congestion) 12/30/23  Yes Wally Gil PA-C   benzonatate (TESSALON) 200 MG capsule TAKE 1 CAPSULE BY MOUTH 3 TIMES PER DAY FOR 14 DAYS 12/7/23  Yes Katlyn Langley MD   doxycycline monohydrate (MONODOX) 100 MG capsule TAKE 1 CAPSULE BY MOUTH 2 TIMES A DAY FOR 14 DAYS 12/16/23  Yes Katlyn Langley MD   fluconazole (DIFLUCAN) 150 MG tablet TAKE 1 TABLET BY MOUTH ONCE 12/16/23  Yes Katlyn Langley MD   levonorgestrel (MIRENA, 52 MG,) IUD 52 mg by IntraUTERine route once   Yes Katlyn Langley MD

## 2024-05-17 ENCOUNTER — APPOINTMENT (OUTPATIENT)
Dept: GENERAL RADIOLOGY | Age: 29
End: 2024-05-17
Payer: COMMERCIAL

## 2024-05-17 ENCOUNTER — HOSPITAL ENCOUNTER (EMERGENCY)
Age: 29
Discharge: HOME | End: 2024-05-17
Payer: COMMERCIAL

## 2024-05-17 VITALS
OXYGEN SATURATION: 97 % | SYSTOLIC BLOOD PRESSURE: 134 MMHG | HEIGHT: 66 IN | DIASTOLIC BLOOD PRESSURE: 73 MMHG | WEIGHT: 265 LBS | HEART RATE: 95 BPM | RESPIRATION RATE: 18 BRPM | TEMPERATURE: 98.4 F | BODY MASS INDEX: 42.59 KG/M2

## 2024-05-17 DIAGNOSIS — R05.1 ACUTE COUGH: ICD-10-CM

## 2024-05-17 DIAGNOSIS — R11.2 NAUSEA AND VOMITING, UNSPECIFIED VOMITING TYPE: ICD-10-CM

## 2024-05-17 DIAGNOSIS — R07.9 CHEST PAIN, UNSPECIFIED TYPE: Primary | ICD-10-CM

## 2024-05-17 LAB
ALBUMIN SERPL-MCNC: 4.5 G/DL (ref 3.4–5)
ALBUMIN/GLOB SERPL: 1.9 {RATIO} (ref 1.1–2.2)
ALP SERPL-CCNC: 105 U/L (ref 40–129)
ALT SERPL-CCNC: 24 U/L (ref 10–40)
ANION GAP SERPL CALCULATED.3IONS-SCNC: 14 MMOL/L (ref 3–16)
AST SERPL-CCNC: 17 U/L (ref 15–37)
BASOPHILS # BLD: 0.1 K/UL (ref 0–0.2)
BASOPHILS NFR BLD: 0.6 %
BILIRUB SERPL-MCNC: 0.3 MG/DL (ref 0–1)
BUN SERPL-MCNC: 11 MG/DL (ref 7–20)
CALCIUM SERPL-MCNC: 9.2 MG/DL (ref 8.3–10.6)
CHLORIDE SERPL-SCNC: 102 MMOL/L (ref 99–110)
CO2 SERPL-SCNC: 23 MMOL/L (ref 21–32)
CREAT SERPL-MCNC: 0.6 MG/DL (ref 0.6–1.1)
D-DIMER QUANTITATIVE: 0.31 UG/ML FEU (ref 0–0.6)
DEPRECATED RDW RBC AUTO: 13.3 % (ref 12.4–15.4)
EOSINOPHIL # BLD: 0.2 K/UL (ref 0–0.6)
EOSINOPHIL NFR BLD: 2.3 %
FLUAV RNA RESP QL NAA+PROBE: NOT DETECTED
FLUBV RNA RESP QL NAA+PROBE: NOT DETECTED
GFR SERPLBLD CREATININE-BSD FMLA CKD-EPI: >90 ML/MIN/{1.73_M2}
GLUCOSE SERPL-MCNC: 100 MG/DL (ref 70–99)
HCG SERPL QL: NEGATIVE
HCT VFR BLD AUTO: 40.7 % (ref 36–48)
HGB BLD-MCNC: 13.8 G/DL (ref 12–16)
LACTATE BLDV-SCNC: 1.3 MMOL/L (ref 0.4–1.9)
LIPASE SERPL-CCNC: 41 U/L (ref 13–60)
LYMPHOCYTES # BLD: 2.1 K/UL (ref 1–5.1)
LYMPHOCYTES NFR BLD: 20.6 %
MCH RBC QN AUTO: 30.4 PG (ref 26–34)
MCHC RBC AUTO-ENTMCNC: 34 G/DL (ref 31–36)
MCV RBC AUTO: 89.3 FL (ref 80–100)
MONOCYTES # BLD: 0.7 K/UL (ref 0–1.3)
MONOCYTES NFR BLD: 6.6 %
NEUTROPHILS # BLD: 7 K/UL (ref 1.7–7.7)
NEUTROPHILS NFR BLD: 69.9 %
NT-PROBNP SERPL-MCNC: <36 PG/ML (ref 0–124)
PLATELET # BLD AUTO: 201 K/UL (ref 135–450)
PMV BLD AUTO: 9 FL (ref 5–10.5)
POTASSIUM SERPL-SCNC: 3.8 MMOL/L (ref 3.5–5.1)
PROT SERPL-MCNC: 6.9 G/DL (ref 6.4–8.2)
RBC # BLD AUTO: 4.56 M/UL (ref 4–5.2)
SARS-COV-2 RNA RESP QL NAA+PROBE: NOT DETECTED
SODIUM SERPL-SCNC: 139 MMOL/L (ref 136–145)
TROPONIN, HIGH SENSITIVITY: <6 NG/L (ref 0–14)
TROPONIN, HIGH SENSITIVITY: <6 NG/L (ref 0–14)
WBC # BLD AUTO: 10 K/UL (ref 4–11)

## 2024-05-17 PROCEDURE — 84703 CHORIONIC GONADOTROPIN ASSAY: CPT

## 2024-05-17 PROCEDURE — 2580000003 HC RX 258: Performed by: PHYSICIAN ASSISTANT

## 2024-05-17 PROCEDURE — 85379 FIBRIN DEGRADATION QUANT: CPT

## 2024-05-17 PROCEDURE — 96361 HYDRATE IV INFUSION ADD-ON: CPT

## 2024-05-17 PROCEDURE — 87636 SARSCOV2 & INF A&B AMP PRB: CPT

## 2024-05-17 PROCEDURE — 99285 EMERGENCY DEPT VISIT HI MDM: CPT

## 2024-05-17 PROCEDURE — 83605 ASSAY OF LACTIC ACID: CPT

## 2024-05-17 PROCEDURE — 96374 THER/PROPH/DIAG INJ IV PUSH: CPT

## 2024-05-17 PROCEDURE — 93005 ELECTROCARDIOGRAM TRACING: CPT | Performed by: PHYSICIAN ASSISTANT

## 2024-05-17 PROCEDURE — 83690 ASSAY OF LIPASE: CPT

## 2024-05-17 PROCEDURE — 6360000002 HC RX W HCPCS: Performed by: PHYSICIAN ASSISTANT

## 2024-05-17 PROCEDURE — 83880 ASSAY OF NATRIURETIC PEPTIDE: CPT

## 2024-05-17 PROCEDURE — 80053 COMPREHEN METABOLIC PANEL: CPT

## 2024-05-17 PROCEDURE — 84484 ASSAY OF TROPONIN QUANT: CPT

## 2024-05-17 PROCEDURE — 71045 X-RAY EXAM CHEST 1 VIEW: CPT

## 2024-05-17 PROCEDURE — 85025 COMPLETE CBC W/AUTO DIFF WBC: CPT

## 2024-05-17 RX ORDER — BENZONATATE 100 MG/1
100 CAPSULE ORAL 3 TIMES DAILY PRN
Qty: 20 CAPSULE | Refills: 0 | Status: SHIPPED | OUTPATIENT
Start: 2024-05-17 | End: 2024-05-24

## 2024-05-17 RX ORDER — DEXAMETHASONE 4 MG/1
12 TABLET ORAL ONCE
Status: COMPLETED | OUTPATIENT
Start: 2024-05-17 | End: 2024-05-17

## 2024-05-17 RX ORDER — 0.9 % SODIUM CHLORIDE 0.9 %
1000 INTRAVENOUS SOLUTION INTRAVENOUS ONCE
Status: COMPLETED | OUTPATIENT
Start: 2024-05-17 | End: 2024-05-17

## 2024-05-17 RX ORDER — ONDANSETRON 2 MG/ML
4 INJECTION INTRAMUSCULAR; INTRAVENOUS EVERY 30 MIN PRN
Status: DISCONTINUED | OUTPATIENT
Start: 2024-05-17 | End: 2024-05-18 | Stop reason: HOSPADM

## 2024-05-17 RX ORDER — DOXYCYCLINE HYCLATE 100 MG
100 TABLET ORAL 2 TIMES DAILY
Qty: 10 TABLET | Refills: 0 | Status: SHIPPED | OUTPATIENT
Start: 2024-05-17 | End: 2024-05-22

## 2024-05-17 RX ADMIN — SODIUM CHLORIDE 1000 ML: 9 INJECTION, SOLUTION INTRAVENOUS at 20:36

## 2024-05-17 RX ADMIN — DEXAMETHASONE 12 MG: 4 TABLET ORAL at 22:29

## 2024-05-17 RX ADMIN — ONDANSETRON 4 MG: 2 INJECTION INTRAMUSCULAR; INTRAVENOUS at 20:36

## 2024-05-17 ASSESSMENT — PAIN DESCRIPTION - DESCRIPTORS: DESCRIPTORS: ACHING

## 2024-05-17 ASSESSMENT — PAIN DESCRIPTION - LOCATION
LOCATION: HEAD
LOCATION: ABDOMEN

## 2024-05-17 ASSESSMENT — PAIN - FUNCTIONAL ASSESSMENT: PAIN_FUNCTIONAL_ASSESSMENT: 0-10

## 2024-05-17 ASSESSMENT — PAIN SCALES - GENERAL
PAINLEVEL_OUTOF10: 3
PAINLEVEL_OUTOF10: 2

## 2024-05-17 ASSESSMENT — PAIN DESCRIPTION - ORIENTATION: ORIENTATION: MID

## 2024-05-18 LAB
EKG ATRIAL RATE: 104 BPM
EKG DIAGNOSIS: NORMAL
EKG P AXIS: 38 DEGREES
EKG P-R INTERVAL: 154 MS
EKG Q-T INTERVAL: 348 MS
EKG QRS DURATION: 74 MS
EKG QTC CALCULATION (BAZETT): 457 MS
EKG R AXIS: 23 DEGREES
EKG T AXIS: 16 DEGREES
EKG VENTRICULAR RATE: 104 BPM

## 2024-05-18 PROCEDURE — 93010 ELECTROCARDIOGRAM REPORT: CPT | Performed by: INTERNAL MEDICINE

## 2024-05-18 NOTE — ED PROVIDER NOTES
I did not have face-to-face interaction with this patient. I did not discuss the case with the advanced practice provider. I was available for consultation on the patient if deemed necessary by advanced practice provider.  EKG  Sinus tach with a rate of 104, normal intervals, no ischemic findings.         Oleg Rose MD  05/17/24 0224

## 2024-05-18 NOTE — ED NOTES
--Patient provided with discharge instructions and any prescriptions.   --Instructions, dosing, and follow-up appointments reviewed with patient/family. No further questions or needs at this time.   --Vital signs and patient stable upon discharge.  --Patient ambulatory to [facility].

## 2024-05-18 NOTE — ED PROVIDER NOTES
[facility]  ED  EMERGENCY DEPARTMENT ENCOUNTER        Pt Name: Janna Piper  MRN: [id number]  Birthdate 1995  Date of evaluation: 5/17/2024  Provider: Colin Esteves PA-C  PCP: Genoveva Gifford APRN - CNP  Note Started: 10:11 PM EDT 5/17/24      CLARA. I have evaluated this patient.        CHIEF COMPLAINT       Chief Complaint   Patient presents with    Emesis     Dark emesis x3.     Cough    Shortness of Breath       HISTORY OF PRESENT ILLNESS: 1 or more Elements     History From: patient  Limitations to history : None    Janna Piper is a 29 y.o. female who presents to the emergency department with a chief complaint of cough, shortness of breath, pleuritic chest pain and nausea and vomiting.  She has been having some chronic issues for multiple months with intermittent nausea and vomiting and was recently seen by general surgery and plan was for follow-up with bariatric surgery as patient wanted to possibly have cholecystectomy and bariatric procedure performed at same time.  She had an appointment today with bariatric surgery at [facility] but canceled as she began having some nausea and vomiting that appeared black in color.  But at times she gets here she states that she is only mildly nauseous.  No longer having any abdominal pain.  States she is having some pleuritic chest pain associate with some sinus congestion and cough that she has been having multiple weeks.  Children have been sick with similar symptoms.  Denies fevers, urinary or bowel symptoms.      Nursing Notes were all reviewed and agreed with or any disagreements were addressed in the HPI.    REVIEW OF SYSTEMS :      Review of Systems    Positives and Pertinent negatives as per HPI.     SURGICAL HISTORY     Past Surgical History:   Procedure Laterality Date    CYST REMOVAL      ovarian    ENDOMETRIAL ABLATION      TONSILLECTOMY AND ADENOIDECTOMY      UPPER GASTROINTESTINAL ENDOSCOPY N/A 11/20/2023    EGD

## 2024-07-02 ENCOUNTER — OFFICE VISIT (OUTPATIENT)
Dept: URGENT CARE | Age: 29
End: 2024-07-02

## 2024-07-02 VITALS
HEIGHT: 63 IN | OXYGEN SATURATION: 96 % | WEIGHT: 276 LBS | BODY MASS INDEX: 48.9 KG/M2 | SYSTOLIC BLOOD PRESSURE: 117 MMHG | TEMPERATURE: 98.2 F | DIASTOLIC BLOOD PRESSURE: 79 MMHG | HEART RATE: 99 BPM

## 2024-07-02 DIAGNOSIS — R35.0 URINARY FREQUENCY: ICD-10-CM

## 2024-07-02 DIAGNOSIS — R30.0 DYSURIA: ICD-10-CM

## 2024-07-02 DIAGNOSIS — R11.0 NAUSEA: ICD-10-CM

## 2024-07-02 DIAGNOSIS — N12 PYELONEPHRITIS: Primary | ICD-10-CM

## 2024-07-02 LAB
APPEARANCE FLUID: CLEAR
BILIRUBIN, POC: NORMAL
BLOOD URINE, POC: NORMAL
CLARITY, POC: CLEAR
COLOR, POC: YELLOW
GLUCOSE URINE, POC: NORMAL
KETONES, POC: NORMAL
LEUKOCYTE EST, POC: NORMAL
NITRITE, POC: NORMAL
PH, POC: 5.5
PROTEIN, POC: NORMAL
SPECIFIC GRAVITY, POC: 1.03
UROBILINOGEN, POC: NORMAL

## 2024-07-02 RX ORDER — BUPROPION HYDROCHLORIDE 150 MG/1
TABLET ORAL
COMMUNITY
Start: 2024-06-28

## 2024-07-02 RX ORDER — ONDANSETRON 4 MG/1
4 TABLET, ORALLY DISINTEGRATING ORAL 3 TIMES DAILY PRN
Qty: 21 TABLET | Refills: 0 | Status: SHIPPED | OUTPATIENT
Start: 2024-07-02

## 2024-07-02 RX ORDER — LAMOTRIGINE 25 MG/1
25 TABLET ORAL DAILY
COMMUNITY
Start: 2024-06-28

## 2024-07-02 RX ORDER — CIPROFLOXACIN 500 MG/1
500 TABLET, FILM COATED ORAL 2 TIMES DAILY
Qty: 14 TABLET | Refills: 0 | Status: SHIPPED | OUTPATIENT
Start: 2024-07-02 | End: 2024-07-09

## 2024-07-02 RX ORDER — FLUCONAZOLE 150 MG/1
150 TABLET ORAL ONCE
Qty: 1 TABLET | Refills: 0 | Status: SHIPPED | OUTPATIENT
Start: 2024-07-02 | End: 2024-07-02

## 2024-07-02 RX ORDER — ZIPRASIDONE HYDROCHLORIDE 20 MG/1
CAPSULE ORAL
COMMUNITY
Start: 2024-06-28

## 2024-07-02 NOTE — PATIENT INSTRUCTIONS
Urine culture sent to confirm, to identify pathogen, and to clarify sensitivities.   Will augment treatment plan as necessary pending culture results.  Cipro is prescribed for antibiotic treatment  Take the antibiotics until completed, do not stop unless otherwise directed by a healthcare provider.  Recommend daily yogurt or other probiotics while on antibiotics.  Zofran is prescribed for relief of the nausea  DO NOT START taking your Geodon and your Wellbutrin until after your nausea has resolved as you cannot take Zofran safely while taking these medications, as these medications can cause complications when taken together.  Diflucan prescribed for treatment of any vaginal yeast infection that may develop after antibiotic use  DO NOT START taking your Geodon until you have completed your treatment with the diflucan, as these medications can cause complications when taken together.  Increase water intake during treatment.  Can take ibuprofen (Motrin or Advil) or Acetaminophen (Tylenol) for pain symptoms.  Follow up with your PCP within 5 days or, if unable, you can return to the clinic if symptoms persist beyond 5 days or if symptoms worsen.    If fevers, shivering, nausea, vomiting, shortness of breath, chest pain, if severe abdominal or back pain develops, or if symptoms continue to worsen despite treatment plan, follow up with the ER for further evaluation.    New Prescriptions    CIPROFLOXACIN (CIPRO) 500 MG TABLET    Take 1 tablet by mouth 2 times daily for 7 days    FLUCONAZOLE (DIFLUCAN) 150 MG TABLET    Take 1 tablet by mouth once for 1 dose    ONDANSETRON (ZOFRAN-ODT) 4 MG DISINTEGRATING TABLET    Take 1 tablet by mouth 3 times daily as needed for Nausea or Vomiting

## 2024-07-02 NOTE — PROGRESS NOTES
Janna Piper (: 1995) is a 29 y.o. female, Established patient, here for evaluation of the following chief complaint(s):  Urinary Tract Infection (Painful urination, nausea/Low back and abdominal pain)      ASSESSMENT/PLAN:    ICD-10-CM    1. Pyelonephritis  N12 Culture, Urine     ciprofloxacin (CIPRO) 500 MG tablet     fluconazole (DIFLUCAN) 150 MG tablet      2. Dysuria  R30.0 POCT Urinalysis no Micro     Culture, Urine      3. Urinary frequency  R35.0 Culture, Urine      4. Nausea  R11.0 ondansetron (ZOFRAN-ODT) 4 MG disintegrating tablet          - Pyelonephritis:  Despite UA without notable findings, given with urinary symptoms, symptoms of malaise and nausea, and exam with CVA tenderness, concern for Pyelonephritis  Low concern for sepsis, nephrolith, colitis, diverticulitis, appendicitis, and vulvo-vaginitis.  Urine culture sent to confirm, to identify pathogen, and to clarify sensitivities.   Will augment treatment plan as necessary pending culture results.  Ciprofloxacin is prescribed for empiric antibiotic treatment  Fluconazole is prescribed for use after the completion of the antibiotic treatment due to history of past vulval yeast infections.  Zofran prescribed for treatment of the nausea  As pt notes has not yet started new treatment plan for her depression and anxiety, discussed holding initiation until after treatment of her current symptoms to due to concerns, especially for Geodon when mixed with Zofran or Fluconazole given history of all three for QT prolongation.  Increase water intake during treatment.  ibuprofen (Motrin or Advil) or Acetaminophen (Tylenol) for pain symptoms.  Strict ED follow up instructions provided.    Discussed PCP follow up for persisting or worsening symptoms, or to return to the clinic if unable to obtain PCP follow up for worsening symptoms.    The patient tolerated their visit well. The patient and/or the family were informed of the results of any tests, a

## 2024-07-04 LAB — BACTERIA UR CULT: NORMAL

## 2024-10-09 ENCOUNTER — OFFICE VISIT (OUTPATIENT)
Dept: URGENT CARE | Age: 29
End: 2024-10-09

## 2024-10-09 VITALS
BODY MASS INDEX: 41.78 KG/M2 | OXYGEN SATURATION: 97 % | WEIGHT: 260 LBS | HEIGHT: 66 IN | SYSTOLIC BLOOD PRESSURE: 123 MMHG | DIASTOLIC BLOOD PRESSURE: 80 MMHG | HEART RATE: 91 BPM | TEMPERATURE: 97.9 F

## 2024-10-09 DIAGNOSIS — H00.033 CELLULITIS OF RIGHT EYELID: Primary | ICD-10-CM

## 2024-10-09 RX ORDER — MOMETASONE FUROATE AND FORMOTEROL FUMARATE DIHYDRATE 50; 5 UG/1; UG/1
AEROSOL RESPIRATORY (INHALATION)
COMMUNITY

## 2024-10-09 RX ORDER — CEPHALEXIN 500 MG/1
500 CAPSULE ORAL 4 TIMES DAILY
Qty: 20 CAPSULE | Refills: 0 | Status: SHIPPED | OUTPATIENT
Start: 2024-10-09 | End: 2024-10-14

## 2024-10-09 RX ORDER — FLUCONAZOLE 150 MG/1
150 TABLET ORAL ONCE
Qty: 1 TABLET | Refills: 1 | Status: SHIPPED | OUTPATIENT
Start: 2024-10-09 | End: 2024-10-09

## 2024-10-09 RX ORDER — PREDNISONE 20 MG/1
20 TABLET ORAL 2 TIMES DAILY
Qty: 10 TABLET | Refills: 0 | Status: SHIPPED | OUTPATIENT
Start: 2024-10-09 | End: 2024-10-14

## 2024-10-09 ASSESSMENT — ENCOUNTER SYMPTOMS
ABDOMINAL PAIN: 0
COUGH: 0
VOMITING: 0
SORE THROAT: 0
SHORTNESS OF BREATH: 0
NAUSEA: 0
EYE PAIN: 1
PHOTOPHOBIA: 0
EYE ITCHING: 0
EYE DISCHARGE: 0
DIARRHEA: 0
EYE REDNESS: 0

## 2024-10-09 ASSESSMENT — VISUAL ACUITY: OU: 1

## 2024-10-09 NOTE — PROGRESS NOTES
Janna Piper (: 1995) is a 29 y.o. female, Established patient, here for evaluation of the following chief complaint(s):  Eye Problem (3-4 days has had right eye swelling, painful in the eye and surrounding.  No crusting.)      ASSESSMENT/PLAN:    ICD-10-CM    1. Cellulitis of right eyelid  H00.033 cephALEXin (KEFLEX) 500 MG capsule     predniSONE (DELTASONE) 20 MG tablet     fluconazole (DIFLUCAN) 150 MG tablet          - Low concern for orbital cellulitis, keratitis, iritis, strep pharyngitis, otitis media, bacterial pneumonia, bronchitis, sinusitis or sepsis.  - Pt to drink lots of fluids  - Pt to take medication as prescribed  - Pt ok to take tylenol as needed  - Pt to call if any symptoms worsen or follow up with PCP if not better in 5 days  - Pt to go to ER if have shortness of breath, chest pain, visual disturbance, sudden fever or lethargy    Discussed PCP follow up for persisting or worsening symptoms, or to return to the clinic if unable to obtain PCP follow up for worsening symptoms.    The patient tolerated their visit well. The patient and/or the family were informed of the results of any tests, a time was given to answer questions, a plan was proposed and they agreed with plan. Reviewed AVS with treatment instructions and answered questions - pt/family expresses understanding and agreement with the discussed treatment plan and AVS instructions.      SUBJECTIVE/OBJECTIVE:  HPI:   29 y.o. female presents for complaint of pt states for the last 3-4 days she has had right eye pain with pain and swelling of the right lower eyelid. Pt denies any drainage from the right eye. Pt denies any trauma to the right eye. Pt states she is having intermittent blurriness of the right eye.    Admits headache and right ear pain.   Denies fever, body aches, chills, nasal congestion, cough, abdominal pain, vomiting or diarrhea.     Has taken ibuprofen at home. No known sick contacts.         History provided

## 2024-10-23 ENCOUNTER — OFFICE VISIT (OUTPATIENT)
Dept: URGENT CARE | Age: 29
End: 2024-10-23

## 2024-10-23 VITALS
HEART RATE: 100 BPM | BODY MASS INDEX: 41.78 KG/M2 | DIASTOLIC BLOOD PRESSURE: 82 MMHG | TEMPERATURE: 98.2 F | WEIGHT: 260 LBS | OXYGEN SATURATION: 97 % | HEIGHT: 66 IN | SYSTOLIC BLOOD PRESSURE: 120 MMHG

## 2024-10-23 DIAGNOSIS — J01.10 ACUTE NON-RECURRENT FRONTAL SINUSITIS: Primary | ICD-10-CM

## 2024-10-23 DIAGNOSIS — J45.21 MILD INTERMITTENT ASTHMA WITH ACUTE EXACERBATION: ICD-10-CM

## 2024-10-23 RX ORDER — CHOLECALCIFEROL TAB 125 MCG (5000 UNIT) 125 MCG (5000 UT)
1 TAB DAILY
COMMUNITY
Start: 2024-07-18

## 2024-10-23 RX ORDER — BENZONATATE 100 MG/1
100 CAPSULE ORAL 3 TIMES DAILY PRN
Qty: 30 CAPSULE | Refills: 0 | Status: SHIPPED | OUTPATIENT
Start: 2024-10-23 | End: 2024-11-02

## 2024-10-23 RX ORDER — CLONIDINE HYDROCHLORIDE 0.1 MG/1
0.1 TABLET ORAL 2 TIMES DAILY
COMMUNITY
Start: 2024-10-06

## 2024-10-23 RX ORDER — ALBUTEROL SULFATE 90 UG/1
2 INHALANT RESPIRATORY (INHALATION) 4 TIMES DAILY PRN
Qty: 18 G | Refills: 0 | Status: SHIPPED | OUTPATIENT
Start: 2024-10-23

## 2024-10-23 RX ORDER — METHYLPREDNISOLONE 4 MG/1
TABLET ORAL
Qty: 1 KIT | Refills: 0 | Status: SHIPPED | OUTPATIENT
Start: 2024-10-23 | End: 2024-10-29

## 2024-10-23 RX ORDER — DOXYCYCLINE HYCLATE 100 MG
100 TABLET ORAL 2 TIMES DAILY
Qty: 14 TABLET | Refills: 0 | Status: SHIPPED | OUTPATIENT
Start: 2024-10-23 | End: 2024-10-30

## 2024-10-23 RX ORDER — ACETAMINOPHEN AND CODEINE PHOSPHATE 120; 12 MG/5ML; MG/5ML
0.35 SOLUTION ORAL DAILY
COMMUNITY
Start: 2024-09-20

## 2024-10-23 ASSESSMENT — ENCOUNTER SYMPTOMS
DIARRHEA: 0
EYE PAIN: 0
COUGH: 1
EYE DISCHARGE: 0
SORE THROAT: 1
NAUSEA: 0
ABDOMINAL PAIN: 0
VOMITING: 0
SHORTNESS OF BREATH: 1
EYE REDNESS: 0

## 2024-10-23 NOTE — PATIENT INSTRUCTIONS
- Pt to drink lots of fluids  - Pt to take medication as prescribed  - Pt ok to take tylenol as needed  - Pt to call if any symptoms worsen or follow up with PCP  - Pt to go to ER if have shortness of breath or chest pain  - Pt to isolate until fever free for 24 hours without fever reducers

## 2024-10-23 NOTE — PROGRESS NOTES
normal.      Pupils: Pupils are equal, round, and reactive to light.   Cardiovascular:      Rate and Rhythm: Normal rate and regular rhythm.      Pulses: Normal pulses.      Heart sounds: Normal heart sounds.   Pulmonary:      Effort: Pulmonary effort is normal. No tachypnea, bradypnea, accessory muscle usage, prolonged expiration, respiratory distress or retractions. She is not intubated.      Breath sounds: Normal air entry. No stridor, decreased air movement or transmitted upper airway sounds. Examination of the right-lower field reveals decreased breath sounds. Examination of the left-lower field reveals decreased breath sounds. Decreased breath sounds present. No wheezing, rhonchi or rales.   Chest:      Chest wall: No tenderness.   Musculoskeletal:      Cervical back: Normal range of motion and neck supple.   Lymphadenopathy:      Cervical: No cervical adenopathy.   Skin:     General: Skin is warm and dry.   Neurological:      General: No focal deficit present.      Mental Status: She is alert and oriented to person, place, and time.   Psychiatric:         Mood and Affect: Mood normal.         Behavior: Behavior normal.         Thought Content: Thought content normal.         Judgment: Judgment normal.       PROCEDURES:  Unless otherwise noted below, none     Procedures    RESULTS:  No results found for this visit on 10/23/24.  An electronic signature was used to authenticate this note.    --Melanie Negron PA-C

## 2024-11-20 ENCOUNTER — INITIAL CONSULT (OUTPATIENT)
Dept: SURGERY | Age: 29
End: 2024-11-20
Payer: COMMERCIAL

## 2024-11-20 VITALS
DIASTOLIC BLOOD PRESSURE: 68 MMHG | WEIGHT: 275.8 LBS | BODY MASS INDEX: 44.32 KG/M2 | HEIGHT: 66 IN | SYSTOLIC BLOOD PRESSURE: 130 MMHG

## 2024-11-20 DIAGNOSIS — K80.20 SYMPTOMATIC CHOLELITHIASIS: Primary | ICD-10-CM

## 2024-11-20 PROCEDURE — G8427 DOCREV CUR MEDS BY ELIG CLIN: HCPCS | Performed by: SURGERY

## 2024-11-20 PROCEDURE — 1036F TOBACCO NON-USER: CPT | Performed by: SURGERY

## 2024-11-20 PROCEDURE — G8484 FLU IMMUNIZE NO ADMIN: HCPCS | Performed by: SURGERY

## 2024-11-20 PROCEDURE — G8417 CALC BMI ABV UP PARAM F/U: HCPCS | Performed by: SURGERY

## 2024-11-20 PROCEDURE — 99213 OFFICE O/P EST LOW 20 MIN: CPT | Performed by: SURGERY

## 2024-11-20 NOTE — PROGRESS NOTES
[facility]    PATIENT NAME: Janna Piper     TODAY'S DATE: 11/20/2024    CHIEF COMPLAINT: RUQ pain    INTERVAL HISTORY/HPI:    Pt with worsening RUQ pain, some nausea, no emesis, some chills at times.     REVIEW OF SYSTEMS:  Pertinent positives and negatives as per interval history section    OBJECTIVE:  VITALS:  /68 (Site: Right Upper Arm, Position: Sitting, Cuff Size: Large Adult)   Ht 1.676 m (5' 5.98\")   Wt 125.1 kg (275 lb 12.8 oz)   BMI 44.54 kg/m²     INTAKE/OUTPUT:    [unfilled]  [unfilled]    CONSTITUTIONAL:  awake and alert  LUNGS:  Respirations easy and unlabored, no crackles or wheezing  CARD:  regular rate and rhythm  ABDOMEN:  normal bowel sounds, soft, non-distended, minimal RUQ pain     Data:  CBC: No results for input(s): \"WBC\", \"HGB\", \"HCT\", \"PLT\" in the last 72 hours.  BMP:  No results for input(s): \"NA\", \"K\", \"CL\", \"CO2\", \"BUN\", \"CREATININE\", \"GLUCOSE\" in the last 72 hours.  Hepatic: No results for input(s): \"AST\", \"ALT\", \"BILITOT\", \"ALKPHOS\" in the last 72 hours.    Invalid input(s): \"ALB\"  Mag:    No results for input(s): \"MG\" in the last 72 hours.   Phos:   No results for input(s): \"PHOS\" in the last 72 hours.   INR: No results for input(s): \"INR\" in the last 72 hours.    Radiology Review:  *Imaging personally reviewed by me.   CT - cholelithiasis, enlarging iliac chain lymph nodes, splenomegaly      ASSESSMENT AND PLAN:  28 yo with symptomatic cholelithiasis and lymphadenopathy  Discussed their diagnosis and indications for operation.  Risks of operation and typical recovery reviewed.  Plan for OR soon.  Will plan for cholecystectomy and lymph node biopsy. After this workup if splenic biopsy needed then could be arranged with IR.    Mac Holliday MD       Electronically signed by Erasmo Holliday MD     [id number]

## 2024-11-21 ENCOUNTER — PREP FOR PROCEDURE (OUTPATIENT)
Dept: SURGERY | Age: 29
End: 2024-11-21

## 2024-11-21 ENCOUNTER — TELEPHONE (OUTPATIENT)
Dept: SURGERY | Age: 29
End: 2024-11-21

## 2024-11-21 DIAGNOSIS — R59.1 LYMPHADENOPATHY: ICD-10-CM

## 2024-11-21 DIAGNOSIS — K80.20 SYMPTOMATIC CHOLELITHIASIS: ICD-10-CM

## 2024-11-21 NOTE — PROGRESS NOTES
Janna Piper    Age 29 y.o.    female    1995    MRN [id number]    11/29/2024  Arrival Time_____________  OR Time____________92 Min     Procedure(s):  ROBOTIC CHOLECYSTECTOMY WITH INTRAOPERATIVE CHOLANGIOGRAM AND LYMPH NODE BIOPSY                      General   Surgeon(s):  Erasmo Holliday, MD      DAY ADMIT ___  SDS/OP ___  OUTPT IN BED ___        Phone [phone] (home)                  PCP _____________________ Phone_________________ Epic ( ) Epic CE ( ) Appt ________    NOTES: _________________________________________ Consult/Cardio _______________    ____________________________________________________________________________    ____________________________________________________________________________  PAT APPT DATE:________ TIME: ________  FAXED QAD: _______  (__) H&P w/ Hospitalist    (__) PAT orders in EPIC    (__) Meet with PAT nurse  __________________________________________________________________________  Preop Nurse phone screen complete: _____________  (__) CBC     (__) W/ DIFF ___________  (__) CT CHEST  __________   (__) Hgb A1C    ___________  (__) CHEST X RAY   __________  (__) LIPID PROFILE  ___________  (__) EKG   __________  (__) PT-INR / APTT  ___________  (__) PFT's   __________  (__) BMP   ___________  (__) CAROTIDS  __________  (__) CMP   ___________  (__) VEIN MAPPING  __________  (__) U/A   ___________  ( X ) HISTORY & PHYSICAL __________  (__) URINE C & S  ___________  (__) CARDIAC CLEARANCE __________  (__) U/A W/ FLEX  ___________  (__) PULM. CLEARANCE __________  (__) SERUM PREGNANCY ___________  (__) Preop Orders in EPIC __________  (__) TYPE & SCREEN __________repeat ( ) (__)  __________________ __________  (__) Albumin   ___________  (__)  __________________ __________  (__) TRANSFERRIN  ___________  (__)  __________________ __________  (__) LIVER PROFILE  ___________  (__) URINE PREG DOS __________  (__) MRSA NASAL SWAB ___________  (__) BLOOD SUGAR

## 2024-11-21 NOTE — PROGRESS NOTES
Surgery Date and Time: 11/29/24 10:30 am    Arrival Time: 0830 am        The instructions given when and if a patient needs to stop oral intake prior to surgery varies. Follow the instructions you were      given by your Surgeon or RN during the Pre-op call.      __X__Do not eat or drink anything after Midnight the night before the surgery. NO gum, mints, candy or ice chips the day of surgery.              Only take the following medications with a small sip of water the morning of surgery: Use inhaler as usual. May take lamictal, wellbutrin, and clonidine with small sip of water              Aspirin, Ibuprofen, Advil, Naproxen, Vitamin E and other Anti-inflammatory products and supplements should be stopped       for 5 -7days before surgery or as directed by your physician.         Check with your Surgeon/PCP/Cardiologist regarding stopping Plavix, Coumadin, Eliquis, Lovenox, Effient, Pradaxa, Xarelto, Fragmin or        other blood thinners and follow their instructions.  Medication: N/A          Last dose:                - If you take a long acting-insulin, take your normal dose the night before surgery & half the dose if taken in the morning.     - Do not smoke or vape, and do not drink any alcoholic beverages 24 hours prior to surgery, this includes NA Beer. Refrain from using     any recreational drugs, including non-prescribed prescription drugs.     -You may brush your teeth and gargle the morning of surgery.  DO NOT SWALLOW WATER.    -You MUST plan for a responsible adult to stay on site while you are here and take you home after your surgery. You will not be allowed                to leave alone or drive yourself home. It is requested someone stay with you the first 24 hrs. Your surgery will be cancelled if you do not                have a ride home with a responsible adult.    -A parent/legal guardian must accompany a child scheduled for surgery and plan to stay at the hospital until the child

## 2024-11-21 NOTE — TELEPHONE ENCOUNTER
Pt was seen in the office on 11/20/24 and was given surgery instructions for a robotic cholecystectomy with intraoperative cholangiogram, lymph node biopsy, possible open procedure (general anesthesia). Scheduled on 11/29/2024 @ 1030/0830 arrival @ [facility], NPO after midnight chasity before surgery, pt will need  day of surgery, PCP to complete pre-op physical, pt understood and agreed to above noted.

## 2024-11-29 ENCOUNTER — ANESTHESIA EVENT (OUTPATIENT)
Dept: OPERATING ROOM | Age: 29
End: 2024-11-29
Payer: COMMERCIAL

## 2024-11-29 ENCOUNTER — ANESTHESIA (OUTPATIENT)
Dept: OPERATING ROOM | Age: 29
End: 2024-11-29
Payer: COMMERCIAL

## 2024-11-29 ENCOUNTER — HOSPITAL ENCOUNTER (OUTPATIENT)
Age: 29
Setting detail: OUTPATIENT SURGERY
Discharge: HOME | End: 2024-11-29
Attending: SURGERY | Admitting: SURGERY
Payer: COMMERCIAL

## 2024-11-29 ENCOUNTER — APPOINTMENT (OUTPATIENT)
Dept: GENERAL RADIOLOGY | Age: 29
End: 2024-11-29
Attending: SURGERY
Payer: COMMERCIAL

## 2024-11-29 VITALS
DIASTOLIC BLOOD PRESSURE: 85 MMHG | WEIGHT: 275 LBS | OXYGEN SATURATION: 94 % | HEART RATE: 98 BPM | TEMPERATURE: 98 F | BODY MASS INDEX: 44.2 KG/M2 | HEIGHT: 66 IN | RESPIRATION RATE: 21 BRPM | SYSTOLIC BLOOD PRESSURE: 122 MMHG

## 2024-11-29 DIAGNOSIS — G89.18 POSTOPERATIVE PAIN: Primary | ICD-10-CM

## 2024-11-29 DIAGNOSIS — K80.20 SYMPTOMATIC CHOLELITHIASIS: ICD-10-CM

## 2024-11-29 DIAGNOSIS — R59.1 LYMPHADENOPATHY: ICD-10-CM

## 2024-11-29 DIAGNOSIS — R52 PAIN: ICD-10-CM

## 2024-11-29 LAB — HCG UR QL: NEGATIVE

## 2024-11-29 PROCEDURE — 88185 FLOWCYTOMETRY/TC ADD-ON: CPT

## 2024-11-29 PROCEDURE — 6370000000 HC RX 637 (ALT 250 FOR IP): Performed by: STUDENT IN AN ORGANIZED HEALTH CARE EDUCATION/TRAINING PROGRAM

## 2024-11-29 PROCEDURE — 6360000002 HC RX W HCPCS: Performed by: NURSE ANESTHETIST, CERTIFIED REGISTERED

## 2024-11-29 PROCEDURE — 7100000000 HC PACU RECOVERY - FIRST 15 MIN: Performed by: SURGERY

## 2024-11-29 PROCEDURE — 7100000011 HC PHASE II RECOVERY - ADDTL 15 MIN: Performed by: SURGERY

## 2024-11-29 PROCEDURE — 7100000010 HC PHASE II RECOVERY - FIRST 15 MIN: Performed by: SURGERY

## 2024-11-29 PROCEDURE — 47562 LAPAROSCOPIC CHOLECYSTECTOMY: CPT | Performed by: SURGERY

## 2024-11-29 PROCEDURE — 3700000001 HC ADD 15 MINUTES (ANESTHESIA): Performed by: SURGERY

## 2024-11-29 PROCEDURE — 3600000009 HC SURGERY ROBOT BASE: Performed by: SURGERY

## 2024-11-29 PROCEDURE — 6360000002 HC RX W HCPCS: Performed by: STUDENT IN AN ORGANIZED HEALTH CARE EDUCATION/TRAINING PROGRAM

## 2024-11-29 PROCEDURE — 2709999900 HC NON-CHARGEABLE SUPPLY: Performed by: SURGERY

## 2024-11-29 PROCEDURE — S2900 ROBOTIC SURGICAL SYSTEM: HCPCS | Performed by: SURGERY

## 2024-11-29 PROCEDURE — 6370000000 HC RX 637 (ALT 250 FOR IP): Performed by: NURSE ANESTHETIST, CERTIFIED REGISTERED

## 2024-11-29 PROCEDURE — 2580000003 HC RX 258: Performed by: NURSE ANESTHETIST, CERTIFIED REGISTERED

## 2024-11-29 PROCEDURE — 2500000003 HC RX 250 WO HCPCS: Performed by: SURGERY

## 2024-11-29 PROCEDURE — 3700000000 HC ANESTHESIA ATTENDED CARE: Performed by: SURGERY

## 2024-11-29 PROCEDURE — 88184 FLOWCYTOMETRY/ TC 1 MARKER: CPT

## 2024-11-29 PROCEDURE — 6360000002 HC RX W HCPCS: Performed by: SURGERY

## 2024-11-29 PROCEDURE — 84703 CHORIONIC GONADOTROPIN ASSAY: CPT

## 2024-11-29 PROCEDURE — 7100000001 HC PACU RECOVERY - ADDTL 15 MIN: Performed by: SURGERY

## 2024-11-29 PROCEDURE — 2720000010 HC SURG SUPPLY STERILE: Performed by: SURGERY

## 2024-11-29 PROCEDURE — 2500000003 HC RX 250 WO HCPCS: Performed by: NURSE ANESTHETIST, CERTIFIED REGISTERED

## 2024-11-29 PROCEDURE — 3600000019 HC SURGERY ROBOT ADDTL 15MIN: Performed by: SURGERY

## 2024-11-29 PROCEDURE — 2580000003 HC RX 258: Performed by: SURGERY

## 2024-11-29 PROCEDURE — 38570 LAPAROSCOPY LYMPH NODE BIOP: CPT | Performed by: SURGERY

## 2024-11-29 RX ORDER — HYDROMORPHONE HYDROCHLORIDE 2 MG/ML
INJECTION, SOLUTION INTRAMUSCULAR; INTRAVENOUS; SUBCUTANEOUS
Status: DISCONTINUED | OUTPATIENT
Start: 2024-11-29 | End: 2024-11-29 | Stop reason: SDUPTHER

## 2024-11-29 RX ORDER — PHENYLEPHRINE HCL IN 0.9% NACL 1 MG/10 ML
SYRINGE (ML) INTRAVENOUS
Status: DISCONTINUED | OUTPATIENT
Start: 2024-11-29 | End: 2024-11-29 | Stop reason: SDUPTHER

## 2024-11-29 RX ORDER — ROCURONIUM BROMIDE 10 MG/ML
INJECTION, SOLUTION INTRAVENOUS
Status: DISCONTINUED | OUTPATIENT
Start: 2024-11-29 | End: 2024-11-29 | Stop reason: SDUPTHER

## 2024-11-29 RX ORDER — SODIUM CHLORIDE 0.9 % (FLUSH) 0.9 %
5-40 SYRINGE (ML) INJECTION PRN
Status: DISCONTINUED | OUTPATIENT
Start: 2024-11-29 | End: 2024-11-29 | Stop reason: HOSPADM

## 2024-11-29 RX ORDER — OXYCODONE HYDROCHLORIDE 5 MG/1
5 TABLET ORAL EVERY 6 HOURS PRN
Qty: 16 TABLET | Refills: 0 | Status: SHIPPED | OUTPATIENT
Start: 2024-11-29 | End: 2024-12-03

## 2024-11-29 RX ORDER — BUPIVACAINE HYDROCHLORIDE AND EPINEPHRINE 5; 5 MG/ML; UG/ML
INJECTION, SOLUTION PERINEURAL PRN
Status: DISCONTINUED | OUTPATIENT
Start: 2024-11-29 | End: 2024-11-29 | Stop reason: HOSPADM

## 2024-11-29 RX ORDER — SODIUM CHLORIDE 9 MG/ML
INJECTION, SOLUTION INTRAVENOUS PRN
Status: DISCONTINUED | OUTPATIENT
Start: 2024-11-29 | End: 2024-11-29 | Stop reason: HOSPADM

## 2024-11-29 RX ORDER — MEPERIDINE HYDROCHLORIDE 50 MG/ML
12.5 INJECTION INTRAMUSCULAR; INTRAVENOUS; SUBCUTANEOUS EVERY 5 MIN PRN
Status: DISCONTINUED | OUTPATIENT
Start: 2024-11-29 | End: 2024-11-29 | Stop reason: HOSPADM

## 2024-11-29 RX ORDER — INDOCYANINE GREEN AND WATER 25 MG
KIT INJECTION
Status: DISCONTINUED | OUTPATIENT
Start: 2024-11-29 | End: 2024-11-29 | Stop reason: SDUPTHER

## 2024-11-29 RX ORDER — FENTANYL CITRATE 50 UG/ML
INJECTION, SOLUTION INTRAMUSCULAR; INTRAVENOUS
Status: DISCONTINUED | OUTPATIENT
Start: 2024-11-29 | End: 2024-11-29 | Stop reason: SDUPTHER

## 2024-11-29 RX ORDER — DROPERIDOL 2.5 MG/ML
0.62 INJECTION, SOLUTION INTRAMUSCULAR; INTRAVENOUS
Status: COMPLETED | OUTPATIENT
Start: 2024-11-29 | End: 2024-11-29

## 2024-11-29 RX ORDER — APREPITANT 40 MG/1
40 CAPSULE ORAL ONCE
Status: COMPLETED | OUTPATIENT
Start: 2024-11-29 | End: 2024-11-29

## 2024-11-29 RX ORDER — LABETALOL HYDROCHLORIDE 5 MG/ML
5 INJECTION, SOLUTION INTRAVENOUS EVERY 10 MIN PRN
Status: DISCONTINUED | OUTPATIENT
Start: 2024-11-29 | End: 2024-11-29 | Stop reason: HOSPADM

## 2024-11-29 RX ORDER — LIDOCAINE HYDROCHLORIDE 20 MG/ML
INJECTION, SOLUTION INFILTRATION; PERINEURAL
Status: DISCONTINUED | OUTPATIENT
Start: 2024-11-29 | End: 2024-11-29 | Stop reason: SDUPTHER

## 2024-11-29 RX ORDER — OXYCODONE HYDROCHLORIDE 5 MG/1
5 TABLET ORAL PRN
Status: COMPLETED | OUTPATIENT
Start: 2024-11-29 | End: 2024-11-29

## 2024-11-29 RX ORDER — ONDANSETRON 2 MG/ML
INJECTION INTRAMUSCULAR; INTRAVENOUS
Status: DISCONTINUED | OUTPATIENT
Start: 2024-11-29 | End: 2024-11-29 | Stop reason: SDUPTHER

## 2024-11-29 RX ORDER — SODIUM CHLORIDE 0.9 % (FLUSH) 0.9 %
5-40 SYRINGE (ML) INJECTION EVERY 12 HOURS SCHEDULED
Status: DISCONTINUED | OUTPATIENT
Start: 2024-11-29 | End: 2024-11-29 | Stop reason: HOSPADM

## 2024-11-29 RX ORDER — NALOXONE HYDROCHLORIDE 0.4 MG/ML
INJECTION, SOLUTION INTRAMUSCULAR; INTRAVENOUS; SUBCUTANEOUS PRN
Status: DISCONTINUED | OUTPATIENT
Start: 2024-11-29 | End: 2024-11-29 | Stop reason: HOSPADM

## 2024-11-29 RX ORDER — INDOCYANINE GREEN AND WATER 25 MG
5 KIT INJECTION ONCE
Status: DISCONTINUED | OUTPATIENT
Start: 2024-11-29 | End: 2024-11-29 | Stop reason: ALTCHOICE

## 2024-11-29 RX ORDER — PROPOFOL 10 MG/ML
INJECTION, EMULSION INTRAVENOUS
Status: DISCONTINUED | OUTPATIENT
Start: 2024-11-29 | End: 2024-11-29 | Stop reason: SDUPTHER

## 2024-11-29 RX ORDER — DIPHENHYDRAMINE HYDROCHLORIDE 50 MG/ML
12.5 INJECTION INTRAMUSCULAR; INTRAVENOUS
Status: DISCONTINUED | OUTPATIENT
Start: 2024-11-29 | End: 2024-11-29 | Stop reason: HOSPADM

## 2024-11-29 RX ORDER — KETOROLAC TROMETHAMINE 30 MG/ML
INJECTION, SOLUTION INTRAMUSCULAR; INTRAVENOUS
Status: DISCONTINUED | OUTPATIENT
Start: 2024-11-29 | End: 2024-11-29 | Stop reason: SDUPTHER

## 2024-11-29 RX ORDER — MIDAZOLAM HYDROCHLORIDE 1 MG/ML
INJECTION, SOLUTION INTRAMUSCULAR; INTRAVENOUS
Status: DISCONTINUED | OUTPATIENT
Start: 2024-11-29 | End: 2024-11-29 | Stop reason: SDUPTHER

## 2024-11-29 RX ORDER — ALBUTEROL SULFATE 90 UG/1
INHALANT RESPIRATORY (INHALATION)
Status: DISCONTINUED | OUTPATIENT
Start: 2024-11-29 | End: 2024-11-29 | Stop reason: SDUPTHER

## 2024-11-29 RX ORDER — SCOLOPAMINE TRANSDERMAL SYSTEM 1 MG/1
1 PATCH, EXTENDED RELEASE TRANSDERMAL ONCE
Status: DISCONTINUED | OUTPATIENT
Start: 2024-11-29 | End: 2024-11-29 | Stop reason: HOSPADM

## 2024-11-29 RX ORDER — ONDANSETRON 4 MG/1
4 TABLET, FILM COATED ORAL 3 TIMES DAILY PRN
Qty: 15 TABLET | Refills: 0 | Status: SHIPPED | OUTPATIENT
Start: 2024-11-29

## 2024-11-29 RX ORDER — OXYCODONE HYDROCHLORIDE 5 MG/1
10 TABLET ORAL PRN
Status: COMPLETED | OUTPATIENT
Start: 2024-11-29 | End: 2024-11-29

## 2024-11-29 RX ORDER — SODIUM CHLORIDE 9 MG/ML
INJECTION, SOLUTION INTRAVENOUS
Status: DISCONTINUED | OUTPATIENT
Start: 2024-11-29 | End: 2024-11-29 | Stop reason: SDUPTHER

## 2024-11-29 RX ORDER — DEXAMETHASONE SODIUM PHOSPHATE 4 MG/ML
INJECTION, SOLUTION INTRA-ARTICULAR; INTRALESIONAL; INTRAMUSCULAR; INTRAVENOUS; SOFT TISSUE
Status: DISCONTINUED | OUTPATIENT
Start: 2024-11-29 | End: 2024-11-29 | Stop reason: SDUPTHER

## 2024-11-29 RX ADMIN — SODIUM CHLORIDE: 9 INJECTION, SOLUTION INTRAVENOUS at 10:36

## 2024-11-29 RX ADMIN — DEXAMETHASONE SODIUM PHOSPHATE 8 MG: 4 INJECTION, SOLUTION INTRAMUSCULAR; INTRAVENOUS at 09:39

## 2024-11-29 RX ADMIN — OXYCODONE HYDROCHLORIDE 5 MG: 5 TABLET ORAL at 12:45

## 2024-11-29 RX ADMIN — HYDROMORPHONE HYDROCHLORIDE 1 MG: 2 INJECTION, SOLUTION INTRAMUSCULAR; INTRAVENOUS; SUBCUTANEOUS at 11:20

## 2024-11-29 RX ADMIN — Medication 100 MCG: at 10:05

## 2024-11-29 RX ADMIN — ROCURONIUM BROMIDE 50 MG: 50 INJECTION, SOLUTION INTRAVENOUS at 09:39

## 2024-11-29 RX ADMIN — MIDAZOLAM 2 MG: 1 INJECTION INTRAMUSCULAR; INTRAVENOUS at 09:34

## 2024-11-29 RX ADMIN — Medication 2 PUFF: at 11:27

## 2024-11-29 RX ADMIN — DROPERIDOL 0.62 MG: 2.5 INJECTION, SOLUTION INTRAMUSCULAR; INTRAVENOUS at 12:32

## 2024-11-29 RX ADMIN — HYDROMORPHONE HYDROCHLORIDE 1 MG: 2 INJECTION, SOLUTION INTRAMUSCULAR; INTRAVENOUS; SUBCUTANEOUS at 11:01

## 2024-11-29 RX ADMIN — FENTANYL CITRATE 100 MCG: 50 INJECTION, SOLUTION INTRAMUSCULAR; INTRAVENOUS at 09:35

## 2024-11-29 RX ADMIN — APREPITANT 40 MG: 40 CAPSULE ORAL at 09:19

## 2024-11-29 RX ADMIN — KETOROLAC TROMETHAMINE 15 MG: 30 INJECTION, SOLUTION INTRAMUSCULAR; INTRAVENOUS at 11:01

## 2024-11-29 RX ADMIN — SUGAMMADEX 200 MG: 100 INJECTION, SOLUTION INTRAVENOUS at 11:21

## 2024-11-29 RX ADMIN — INDOCYANINE GREEN AND WATER 12.5 MG: KIT at 10:39

## 2024-11-29 RX ADMIN — CEFAZOLIN 3000 MG: 3 INJECTION, POWDER, FOR SOLUTION INTRAVENOUS at 09:44

## 2024-11-29 RX ADMIN — SODIUM CHLORIDE: 9 INJECTION, SOLUTION INTRAVENOUS at 09:34

## 2024-11-29 RX ADMIN — ONDANSETRON 4 MG: 2 INJECTION INTRAMUSCULAR; INTRAVENOUS at 09:39

## 2024-11-29 RX ADMIN — LIDOCAINE HYDROCHLORIDE 80 MG: 20 INJECTION, SOLUTION INFILTRATION; PERINEURAL at 09:39

## 2024-11-29 RX ADMIN — PROPOFOL 200 MG: 10 INJECTION, EMULSION INTRAVENOUS at 09:39

## 2024-11-29 ASSESSMENT — PAIN - FUNCTIONAL ASSESSMENT: PAIN_FUNCTIONAL_ASSESSMENT: 0-10

## 2024-11-29 ASSESSMENT — PAIN DESCRIPTION - LOCATION: LOCATION: ABDOMEN

## 2024-11-29 ASSESSMENT — PAIN DESCRIPTION - DESCRIPTORS: DESCRIPTORS: DULL

## 2024-11-29 ASSESSMENT — PAIN SCALES - WONG BAKER: WONGBAKER_NUMERICALRESPONSE: NO HURT

## 2024-11-29 NOTE — PROGRESS NOTES
Pt met criteria to d/c home from Phase II care at 1330. Pt d/cd home at this time d/t ride issues.

## 2024-11-29 NOTE — PROGRESS NOTES
Patient arrived to PACU bay 5, phase one initiated. Placed on bedside monitor, VSS. Report obtained from OR RN and anesthesia. Patient on O2 via simple mask at 6L. See flowsheets for assessment. Side rails in place, will monitor patient closely. Scopolamine patch noted behind left ear.

## 2024-11-29 NOTE — H&P
I have reviewed the history and physical and examined the patient.  I find no relevant changes.  I have reviewed with the patient and/or family members, during the preoperative office visit the risks, benefits, and alternatives to the procedure.    Erasmo Holliday MD      PATIENT NAME: Janna Piper      TODAY'S DATE: 11/20/2024     CHIEF COMPLAINT: RUQ pain     INTERVAL HISTORY/HPI:    Pt with worsening RUQ pain, some nausea, no emesis, some chills at times.     REVIEW OF SYSTEMS:  Pertinent positives and negatives as per interval history section     OBJECTIVE:  VITALS:  /68 (Site: Right Upper Arm, Position: Sitting, Cuff Size: Large Adult)   Ht 1.676 m (5' 5.98\")   Wt 125.1 kg (275 lb 12.8 oz)   BMI 44.54 kg/m²      INTAKE/OUTPUT:    [unfilled]  [unfilled]     CONSTITUTIONAL:  awake and alert  LUNGS:  Respirations easy and unlabored, no crackles or wheezing  CARD:  regular rate and rhythm  ABDOMEN:  normal bowel sounds, soft, non-distended, minimal RUQ pain      Data:  CBC: No results for input(s): \"WBC\", \"HGB\", \"HCT\", \"PLT\" in the last 72 hours.  BMP:  No results for input(s): \"NA\", \"K\", \"CL\", \"CO2\", \"BUN\", \"CREATININE\", \"GLUCOSE\" in the last 72 hours.  Hepatic: No results for input(s): \"AST\", \"ALT\", \"BILITOT\", \"ALKPHOS\" in the last 72 hours.     Invalid input(s): \"ALB\"  Mag:    No results for input(s): \"MG\" in the last 72 hours.   Phos:   No results for input(s): \"PHOS\" in the last 72 hours.   INR: No results for input(s): \"INR\" in the last 72 hours.     Radiology Review:  *Imaging personally reviewed by me.   CT - cholelithiasis, enlarging iliac chain lymph nodes, splenomegaly        ASSESSMENT AND PLAN:  30 yo with symptomatic cholelithiasis and lymphadenopathy  Discussed their diagnosis and indications for operation.  Risks of operation and typical recovery reviewed.  Plan for OR soon.  Will plan for cholecystectomy and lymph node biopsy. After this workup if splenic biopsy needed

## 2024-11-29 NOTE — PROGRESS NOTES
Pt meets criteria to end Phase I care. Patient alert, off oxygen, tolerating PO. Phase II initiated, VSS.

## 2024-11-29 NOTE — ANESTHESIA PRE PROCEDURE
Department of Anesthesiology  Preprocedure Note       Name:  Janna Piper   Age:  29 y.o.  :  1995                                          MRN:  [id number]         Date:  2024      Surgeon: Surgeon(s):  Erasmo Holliday MD    Procedure: Procedure(s):  ROBOTIC CHOLECYSTECTOMY WITH INTRAOPERATIVE CHOLANGIOGRAM AND LYMPH NODE BIOPSY    Medications prior to admission:   Prior to Admission medications    Medication Sig Start Date End Date Taking? Authorizing Provider   albuterol sulfate HFA (VENTOLIN HFA) 108 (90 Base) MCG/ACT inhaler Inhale 2 puffs into the lungs 4 times daily as needed for Wheezing 10/23/24  Yes Melanie Negron PA-C   Mometasone Furo-Formoterol Fum (DULERA) 50-5 MCG/ACT AERO Inhale 2 puffs into the lungs 2 times daily   Yes ProviderKatlyn MD   albuterol sulfate HFA (VENTOLIN HFA) 108 (90 Base) MCG/ACT inhaler Inhale 2 puffs into the lungs every 6 hours as needed for Wheezing 17  Yes Kimberlee Mejia, APRN - CNP   NATURAL VITAMIN D-3 125 MCG (5000 UT) TABS tablet Take 1 tablet by mouth daily 24   Katlyn Langley MD   cloNIDine (CATAPRES) 0.1 MG tablet Take 1 tablet by mouth 2 times daily 10/6/24   Katlyn Langley MD   norethindrone (MICRONOR) 0.35 MG tablet Take 1 tablet by mouth daily 24   Katlyn Langley MD   buPROPion (WELLBUTRIN XL) 150 MG extended release tablet  24   Katlyn Langley MD   lamoTRIgine (LAMICTAL) 25 MG tablet Take 1 tablet by mouth daily  Patient not taking: Reported on 2024   Katlyn Langley MD   ziprasidone (GEODON) 20 MG capsule  24   Katlyn Langley MD   ondansetron (ZOFRAN-ODT) 4 MG disintegrating tablet Take 1 tablet by mouth 3 times daily as needed for Nausea or Vomiting 24   Wally Gil PA-C   levonorgestrel (MIRENA, 52 MG,) IUD 52 mg by IntraUTERine route once    Katlyn Langley MD   omeprazole (PRILOSEC) 40 MG delayed release capsule Take 1 capsule by

## 2024-11-29 NOTE — ANESTHESIA POSTPROCEDURE EVALUATION
Department of Anesthesiology  Postprocedure Note    Patient: Janna Piper  MRN: [id number]  YOB: 1995  Date of evaluation: 11/29/2024    Procedure Summary       Date: 11/29/24 Room / Location: [address]    Anesthesia Start: 0934 Anesthesia Stop: 1132    Procedure: ROBOTIC CHOLECYSTECTOMY AND LYMPH NODE BIOPSY (Abdomen) Diagnosis:       Symptomatic cholelithiasis      Lymphadenopathy      (Symptomatic cholelithiasis [K80.20])      (Lymphadenopathy [R59.1])    Surgeons: Erasmo Holliday MD Responsible Provider: Ronald Negron MD    Anesthesia Type: general ASA Status: 2            Anesthesia Type: No value filed.    Abner Phase I: Abner Score: 9    Abner Phase II: Abner Score: 10    Anesthesia Post Evaluation    Patient location during evaluation: PACU  Patient participation: complete - patient participated  Level of consciousness: awake and alert  Airway patency: patent  Nausea & Vomiting: no nausea and no vomiting  Cardiovascular status: hemodynamically stable  Respiratory status: acceptable  Hydration status: euvolemic  Pain management: adequate    No notable events documented.

## 2024-11-29 NOTE — OP NOTE
Date of Surgery: 11/29/24    Preop Dx: symptomatic cholelithiasis and lymphadenopathy    Postop Dx: same    Procedure: Robotic Cholecystectomy and Mesenteric Excisional Lymph Node Biopsy    Surgeon: Mg     Assistant:     Anesthesia: GETA     EBL: <50ml    Specimen: gallbladder, lymph node mesentery    Complications: noen    Drains/Lines: none    Indications: 28 yo with symptomatic cholelithiasis and persistent enlarged mesenteric lymphadenopathy    Description:  Patient was given adequate description of the risks and rewards of the procedure, including bleeding, infection, injury to surrounding structures such as the common bile duct, need for further surgery, and possibility of open procedure and freely consented. She was given appropriate antibiotics and brought to the OR where general anesthesia was induced. She was placed in supine position. Prepped and draped in usual sterile fashion.     Area above the umbilicus injected with local anesthetic and #11 blade used to incise epidermis. This allowed passage of 5mm optiview trocar using zero degree laparoscope. Once this was inserted the abdomen was insufflated to 15 mmHg pressure with CO2. Thirty degree laparoscope inserted. Abdomen inspected and no inflammation of gallbladder seen.  The left lateral trocar was placed with an 8mm trocar under direct visualization. Another 8mm trocar and 5mm trocar were inserted in the patient's right side under direct visualization.  The supraumbilical trocar was then substituted for a 12mm trocar.  Patient was placed in slight reverse trendelenburg position. The robot was then brought into position and connected to the appropriate trocars. The camera was inserted as well as initial instruments. I then went to the robot console. Gallbladder then grasped and retracted over dome of liver. Also grasped at infundibulum and retracted anterolaterally. Cystic duct then circumferentially dissected. One endoclip placed proximally

## 2024-11-29 NOTE — DISCHARGE INSTRUCTIONS
[facility]    Toñito Banegas M.D.   [facility] Office      [facility]        [facility]               [address]                [address]  Mac Holliday M.D.              [address]           [address]          [place] [address]         [place] [address]  Moreno Torres M.D                         [phone]        [facility]                   Jose Caal M.D.          [facility]       POST-OPERATIVE INSTRUCTIONS FOR GALLBLADDER SURGERY    Call the office to schedule your post-operative appointment with your surgeon for two (2) weeks.     You will have either white steri-strips and a water occlusive dressing or surgical glue closing your incisions.    If you have clear bandages over your incisions, you may remove them in 3-4 days.  Leave the steri-stips in place. These will peel away in 7-10 days.     You may shower.  Wash incisions gently, and pat them dry. Do not rub your incisions.    General guidelines for activity:   Avoid strenuous activity or lifting anything heavier than 20 pounds.    It is OK to be up  walking around, and walking up and down stairs.     Do what is comfortable: stop and rest when you feel tired.   Drink plenty of fluids and stay on a bland diet for 2-3 days after surgery.     Do NOT drive while taking your narcotic pain medicine.     Watch for signs of infection:  Excessive warmth or bright redness around your incisions  Leakage cloudy fluid from you incisions  Fever over 101.5  During the laparoscopic procedure that you had, gas is pumped into the abdominal cavity.  You may feel abdominal, shoulder, or rib pain for a few days due to this gas.    You will have pain medicine ordered. Take as directed    If you experience constipation:  Increase your water intake  Increase your activity, walking is best.  A stool softener or mild laxative

## 2024-12-01 RX ORDER — PROMETHAZINE HYDROCHLORIDE 12.5 MG/1
12.5 TABLET ORAL EVERY 8 HOURS PRN
Qty: 20 TABLET | Refills: 0 | Status: SHIPPED | OUTPATIENT
Start: 2024-12-01 | End: 2024-12-08

## 2024-12-04 ENCOUNTER — OFFICE VISIT (OUTPATIENT)
Dept: SURGERY | Age: 29
End: 2024-12-04

## 2024-12-04 VITALS
HEIGHT: 66 IN | DIASTOLIC BLOOD PRESSURE: 78 MMHG | SYSTOLIC BLOOD PRESSURE: 122 MMHG | WEIGHT: 267.6 LBS | BODY MASS INDEX: 43.01 KG/M2

## 2024-12-04 DIAGNOSIS — Z90.49 S/P LAPAROSCOPIC CHOLECYSTECTOMY: Primary | ICD-10-CM

## 2024-12-04 PROCEDURE — 99024 POSTOP FOLLOW-UP VISIT: CPT | Performed by: SURGERY

## 2024-12-07 NOTE — PROGRESS NOTES
HPI: Nursing notes reviewed.  Patient reports serous drainage at one of incisions. Having abd pain.  No emesis. No fevers.    ROS:  10 point review of systems performed with pertinent positives in HPI    Phys:     Abd - soft, appropriately tender, nondistended    Incisions - no erythema, minimal serous drainage under glue from one site    Assesment: 28 yo s/p robotic patricia and lymph node biopsy    Plan: Discussed that pain is expected at this point postop  with the nature of her operation   No signs of wound infection   Discussed pathology not showing lymphoma - to follow up with her HemOnc later this month   F/u in 1-2 weeks

## 2024-12-16 ENCOUNTER — OFFICE VISIT (OUTPATIENT)
Dept: SURGERY | Age: 29
End: 2024-12-16

## 2024-12-16 VITALS
HEIGHT: 66 IN | BODY MASS INDEX: 43.07 KG/M2 | DIASTOLIC BLOOD PRESSURE: 68 MMHG | WEIGHT: 268 LBS | SYSTOLIC BLOOD PRESSURE: 128 MMHG

## 2024-12-16 DIAGNOSIS — Z90.49 S/P LAPAROSCOPIC CHOLECYSTECTOMY: Primary | ICD-10-CM

## 2024-12-16 PROCEDURE — 99024 POSTOP FOLLOW-UP VISIT: CPT | Performed by: SURGERY

## 2024-12-17 NOTE — PROGRESS NOTES
HPI: Nursing notes reviewed.  Patient is a 30 yo who underwent robotic cholecystectomy at [facility].   Reports improved pain and no nausea.  Energy is improving.  no diarrhea and no constipation.    ROS:  10 point review of systems performed with pertinent positives in HPI    Phys:    Abd - soft, nondistended, minimal tender   Incisions - no erythema    Assesment: 30 yo s/p robotic cholecystectomy and lymph node biopsy    Plan: 1.  Doing well postop with less pain and no nausea   2.  No activity limitations   3.  Low fat diet   4.  Call with concerns

## 2025-01-30 ENCOUNTER — HOSPITAL ENCOUNTER (EMERGENCY)
Age: 30
Discharge: HOME | End: 2025-01-30
Payer: COMMERCIAL

## 2025-01-30 VITALS
SYSTOLIC BLOOD PRESSURE: 128 MMHG | TEMPERATURE: 98.1 F | HEART RATE: 89 BPM | HEIGHT: 66 IN | BODY MASS INDEX: 44.2 KG/M2 | RESPIRATION RATE: 18 BRPM | WEIGHT: 275 LBS | OXYGEN SATURATION: 98 % | DIASTOLIC BLOOD PRESSURE: 98 MMHG

## 2025-01-30 DIAGNOSIS — M54.50 ACUTE LEFT-SIDED LOW BACK PAIN WITHOUT SCIATICA: Primary | ICD-10-CM

## 2025-01-30 DIAGNOSIS — N39.0 URINARY TRACT INFECTION WITHOUT HEMATURIA, SITE UNSPECIFIED: ICD-10-CM

## 2025-01-30 LAB
ALBUMIN SERPL-MCNC: 4.5 G/DL (ref 3.4–5)
ALBUMIN/GLOB SERPL: 2 {RATIO} (ref 1.1–2.2)
ALP SERPL-CCNC: 116 U/L (ref 40–129)
ALT SERPL-CCNC: 45 U/L (ref 10–40)
ANION GAP SERPL CALCULATED.3IONS-SCNC: 10 MMOL/L (ref 3–16)
AST SERPL-CCNC: 28 U/L (ref 15–37)
BACTERIA URNS QL MICRO: ABNORMAL /HPF
BASOPHILS # BLD: 0.1 K/UL (ref 0–0.2)
BASOPHILS NFR BLD: 1.1 %
BILIRUB SERPL-MCNC: <0.2 MG/DL (ref 0–1)
BILIRUB UR QL STRIP.AUTO: NEGATIVE
BUN SERPL-MCNC: 8 MG/DL (ref 7–20)
CALCIUM SERPL-MCNC: 9.7 MG/DL (ref 8.3–10.6)
CHLORIDE SERPL-SCNC: 104 MMOL/L (ref 99–110)
CLARITY UR: CLEAR
CO2 SERPL-SCNC: 27 MMOL/L (ref 21–32)
COLOR UR: YELLOW
CREAT SERPL-MCNC: 0.8 MG/DL (ref 0.6–1.1)
DEPRECATED RDW RBC AUTO: 13.2 % (ref 12.4–15.4)
EOSINOPHIL # BLD: 0.2 K/UL (ref 0–0.6)
EOSINOPHIL NFR BLD: 2.9 %
EPI CELLS #/AREA URNS HPF: ABNORMAL /HPF (ref 0–5)
GFR SERPLBLD CREATININE-BSD FMLA CKD-EPI: >90 ML/MIN/{1.73_M2}
GLUCOSE SERPL-MCNC: 140 MG/DL (ref 70–99)
GLUCOSE UR STRIP.AUTO-MCNC: NEGATIVE MG/DL
HCG UR QL: NEGATIVE
HCT VFR BLD AUTO: 41.3 % (ref 36–48)
HGB BLD-MCNC: 14.1 G/DL (ref 12–16)
HGB UR QL STRIP.AUTO: NEGATIVE
KETONES UR STRIP.AUTO-MCNC: NEGATIVE MG/DL
LEUKOCYTE ESTERASE UR QL STRIP.AUTO: ABNORMAL
LYMPHOCYTES # BLD: 2 K/UL (ref 1–5.1)
LYMPHOCYTES NFR BLD: 24.4 %
MCH RBC QN AUTO: 30.7 PG (ref 26–34)
MCHC RBC AUTO-ENTMCNC: 34 G/DL (ref 31–36)
MCV RBC AUTO: 90.3 FL (ref 80–100)
MONOCYTES # BLD: 0.5 K/UL (ref 0–1.3)
MONOCYTES NFR BLD: 5.7 %
NEUTROPHILS # BLD: 5.3 K/UL (ref 1.7–7.7)
NEUTROPHILS NFR BLD: 65.9 %
NITRITE UR QL STRIP.AUTO: NEGATIVE
PH UR STRIP.AUTO: 6 [PH] (ref 5–8)
PLATELET # BLD AUTO: 192 K/UL (ref 135–450)
PMV BLD AUTO: 9.1 FL (ref 5–10.5)
POTASSIUM SERPL-SCNC: 4 MMOL/L (ref 3.5–5.1)
PROT SERPL-MCNC: 6.8 G/DL (ref 6.4–8.2)
PROT UR STRIP.AUTO-MCNC: NEGATIVE MG/DL
RBC # BLD AUTO: 4.58 M/UL (ref 4–5.2)
RBC #/AREA URNS HPF: ABNORMAL /HPF (ref 0–4)
SODIUM SERPL-SCNC: 141 MMOL/L (ref 136–145)
SP GR UR STRIP.AUTO: >=1.03 (ref 1–1.03)
UA COMPLETE W REFLEX CULTURE PNL UR: YES
UA DIPSTICK W REFLEX MICRO PNL UR: YES
URN SPEC COLLECT METH UR: ABNORMAL
UROBILINOGEN UR STRIP-ACNC: 0.2 E.U./DL
WBC # BLD AUTO: 8 K/UL (ref 4–11)
WBC #/AREA URNS HPF: ABNORMAL /HPF (ref 0–5)

## 2025-01-30 PROCEDURE — 87086 URINE CULTURE/COLONY COUNT: CPT

## 2025-01-30 PROCEDURE — 6370000000 HC RX 637 (ALT 250 FOR IP): Performed by: PHYSICIAN ASSISTANT

## 2025-01-30 PROCEDURE — 6360000002 HC RX W HCPCS: Performed by: PHYSICIAN ASSISTANT

## 2025-01-30 PROCEDURE — 96374 THER/PROPH/DIAG INJ IV PUSH: CPT

## 2025-01-30 PROCEDURE — 80053 COMPREHEN METABOLIC PANEL: CPT

## 2025-01-30 PROCEDURE — 99284 EMERGENCY DEPT VISIT MOD MDM: CPT

## 2025-01-30 PROCEDURE — 87077 CULTURE AEROBIC IDENTIFY: CPT

## 2025-01-30 PROCEDURE — 81001 URINALYSIS AUTO W/SCOPE: CPT

## 2025-01-30 PROCEDURE — 36415 COLL VENOUS BLD VENIPUNCTURE: CPT

## 2025-01-30 PROCEDURE — 85025 COMPLETE CBC W/AUTO DIFF WBC: CPT

## 2025-01-30 PROCEDURE — 84703 CHORIONIC GONADOTROPIN ASSAY: CPT

## 2025-01-30 RX ORDER — OXYCODONE AND ACETAMINOPHEN 5; 325 MG/1; MG/1
1 TABLET ORAL ONCE
Status: COMPLETED | OUTPATIENT
Start: 2025-01-30 | End: 2025-01-30

## 2025-01-30 RX ORDER — NAPROXEN 500 MG/1
500 TABLET ORAL 2 TIMES DAILY WITH MEALS
Qty: 20 TABLET | Refills: 0 | Status: SHIPPED | OUTPATIENT
Start: 2025-01-30 | End: 2025-02-09

## 2025-01-30 RX ORDER — CEFDINIR 300 MG/1
300 CAPSULE ORAL 2 TIMES DAILY
Qty: 14 CAPSULE | Refills: 0 | Status: SHIPPED | OUTPATIENT
Start: 2025-01-30 | End: 2025-02-06

## 2025-01-30 RX ORDER — CEFDINIR 300 MG/1
300 CAPSULE ORAL ONCE
Status: COMPLETED | OUTPATIENT
Start: 2025-01-30 | End: 2025-01-30

## 2025-01-30 RX ORDER — OXYCODONE AND ACETAMINOPHEN 5; 325 MG/1; MG/1
1 TABLET ORAL EVERY 6 HOURS PRN
Qty: 6 TABLET | Refills: 0 | Status: SHIPPED | OUTPATIENT
Start: 2025-01-30 | End: 2025-02-02

## 2025-01-30 RX ORDER — KETOROLAC TROMETHAMINE 30 MG/ML
30 INJECTION, SOLUTION INTRAMUSCULAR; INTRAVENOUS ONCE
Status: COMPLETED | OUTPATIENT
Start: 2025-01-30 | End: 2025-01-30

## 2025-01-30 RX ADMIN — KETOROLAC TROMETHAMINE 30 MG: 30 INJECTION, SOLUTION INTRAMUSCULAR at 21:50

## 2025-01-30 RX ADMIN — CEFDINIR 300 MG: 300 CAPSULE ORAL at 22:42

## 2025-01-30 RX ADMIN — OXYCODONE AND ACETAMINOPHEN 1 TABLET: 5; 325 TABLET ORAL at 21:50

## 2025-01-30 ASSESSMENT — PAIN - FUNCTIONAL ASSESSMENT: PAIN_FUNCTIONAL_ASSESSMENT: 0-10

## 2025-01-30 ASSESSMENT — PAIN DESCRIPTION - DESCRIPTORS: DESCRIPTORS: ACHING

## 2025-01-30 ASSESSMENT — PAIN SCALES - GENERAL
PAINLEVEL_OUTOF10: 2
PAINLEVEL_OUTOF10: 3

## 2025-01-30 ASSESSMENT — PAIN DESCRIPTION - LOCATION: LOCATION: BACK

## 2025-01-30 ASSESSMENT — PAIN DESCRIPTION - ORIENTATION: ORIENTATION: LEFT

## 2025-01-31 LAB
BACTERIA UR CULT: ABNORMAL
BACTERIA UR CULT: ABNORMAL
ORGANISM: ABNORMAL

## 2025-01-31 NOTE — ED PROVIDER NOTES
the lungs every 6 hours as needed for Wheezing, Disp-1 Inhaler, R-3Normal       !! - Potential duplicate medications found. Please discuss with provider.          ALLERGIES:    Azo bladder control-go-less    FAMILY HISTORY:     @FAMX    SOCIAL HISTORY:       Social History     Socioeconomic History    Marital status: Single     Spouse name: None    Number of children: None    Years of education: None    Highest education level: None   Tobacco Use    Smoking status: Never    Smokeless tobacco: Never   Vaping Use    Vaping status: Never Used   Substance and Sexual Activity    Alcohol use: Yes     Comment: 1-2 drinks per year    Drug use: No    Sexual activity: Yes     Partners: Male     Comment: IUD     Social Determinants of Health      Received from [employer] [facility] [employer], [employer] and [employer] [facility]    Food Insecurities    [facility] [employer], [employer] and [employer]    Transportation    [facility] from [facility] [employer] [facility], [employer] [facility]    Interpersonal Safety    Received from [facility] [employer] [facility], [facility] [employer] [facility]    Housing/Utilities       SCREENINGS:           CIWA Assessment  BP: (!) 128/98  Pulse: 89        PHYSICAL EXAM:       ED Triage Vitals   BP Systolic BP Percentile Diastolic BP Percentile Temp Temp Source Pulse Respirations SpO2   01/30/25 2123 -- -- 01/30/25 2123 01/30/25 2123 01/30/25 2123 01/30/25 2123 01/30/25 2123   135/87   98.1 °F (36.7 °C) Oral 90 18 97 %      Height Weight - Scale         01/30/25 2120 01/30/25 2120         1.676 m (5' 6\") 124.7 kg (275 lb)             Physical Exam    CONSTITUTIONAL: Obese. Awake and alert. Cooperative. Well-developed. Well-nourished. Non-toxic. No acute distress.  HENT: Normocephalic. Atraumatic. External ears normal, without discharge. No nasal discharge. Oropharynx clear. Mucous

## 2025-02-01 NOTE — RESULT ENCOUNTER NOTE
Culture reviewed, culture is positive, patient was discharged on an appropriate antibiotic - cefdinir. No further action needed.

## 2025-08-13 ENCOUNTER — APPOINTMENT (OUTPATIENT)
Dept: GENERAL RADIOLOGY | Age: 30
End: 2025-08-13
Payer: COMMERCIAL

## 2025-08-13 ENCOUNTER — HOSPITAL ENCOUNTER (EMERGENCY)
Age: 30
Discharge: HOME | End: 2025-08-14
Attending: EMERGENCY MEDICINE
Payer: COMMERCIAL

## 2025-08-13 DIAGNOSIS — R07.89 OTHER CHEST PAIN: ICD-10-CM

## 2025-08-13 DIAGNOSIS — M79.604 RIGHT LEG PAIN: Primary | ICD-10-CM

## 2025-08-13 LAB
ALBUMIN SERPL-MCNC: 5 G/DL (ref 3.4–5)
ALBUMIN/GLOB SERPL: 2.3 {RATIO} (ref 1.1–2.2)
ALP SERPL-CCNC: 113 U/L (ref 40–129)
ALT SERPL-CCNC: 52 U/L (ref 10–40)
ANION GAP SERPL CALCULATED.3IONS-SCNC: 14 MMOL/L (ref 3–16)
AST SERPL-CCNC: 41 U/L (ref 15–37)
BASOPHILS # BLD: 0.1 K/UL (ref 0–0.2)
BASOPHILS NFR BLD: 0.9 %
BILIRUB SERPL-MCNC: 0.4 MG/DL (ref 0–1)
BUN SERPL-MCNC: 7 MG/DL (ref 7–20)
CALCIUM SERPL-MCNC: 10.6 MG/DL (ref 8.3–10.6)
CHLORIDE SERPL-SCNC: 102 MMOL/L (ref 99–110)
CO2 SERPL-SCNC: 25 MMOL/L (ref 21–32)
CREAT SERPL-MCNC: 0.9 MG/DL (ref 0.6–1.1)
D-DIMER QUANTITATIVE: <0.27 UG/ML FEU (ref 0–0.6)
DEPRECATED RDW RBC AUTO: 13.1 % (ref 12.4–15.4)
EOSINOPHIL # BLD: 0.2 K/UL (ref 0–0.6)
EOSINOPHIL NFR BLD: 3.6 %
GFR SERPLBLD CREATININE-BSD FMLA CKD-EPI: 89 ML/MIN/{1.73_M2}
GLUCOSE SERPL-MCNC: 116 MG/DL (ref 70–99)
HCT VFR BLD AUTO: 42.6 % (ref 36–48)
HGB BLD-MCNC: 14.5 G/DL (ref 12–16)
LIPASE SERPL-CCNC: 27 U/L (ref 13–60)
LYMPHOCYTES # BLD: 1.2 K/UL (ref 1–5.1)
LYMPHOCYTES NFR BLD: 17.9 %
MCH RBC QN AUTO: 30.1 PG (ref 26–34)
MCHC RBC AUTO-ENTMCNC: 34.1 G/DL (ref 31–36)
MCV RBC AUTO: 88.2 FL (ref 80–100)
MONOCYTES # BLD: 0.5 K/UL (ref 0–1.3)
MONOCYTES NFR BLD: 7.3 %
NEUTROPHILS # BLD: 4.5 K/UL (ref 1.7–7.7)
NEUTROPHILS NFR BLD: 70.3 %
NT-PROBNP SERPL-MCNC: <36 PG/ML (ref 0–124)
PLATELET # BLD AUTO: 180 K/UL (ref 135–450)
PMV BLD AUTO: 8.8 FL (ref 5–10.5)
POTASSIUM SERPL-SCNC: 3.4 MMOL/L (ref 3.5–5.1)
PROT SERPL-MCNC: 7.2 G/DL (ref 6.4–8.2)
RBC # BLD AUTO: 4.83 M/UL (ref 4–5.2)
SODIUM SERPL-SCNC: 141 MMOL/L (ref 136–145)
TROPONIN, HIGH SENSITIVITY: <6 NG/L (ref 0–14)
WBC # BLD AUTO: 6.5 K/UL (ref 4–11)

## 2025-08-13 PROCEDURE — 99285 EMERGENCY DEPT VISIT HI MDM: CPT

## 2025-08-13 PROCEDURE — 6370000000 HC RX 637 (ALT 250 FOR IP): Performed by: EMERGENCY MEDICINE

## 2025-08-13 PROCEDURE — 71045 X-RAY EXAM CHEST 1 VIEW: CPT

## 2025-08-13 PROCEDURE — 85025 COMPLETE CBC W/AUTO DIFF WBC: CPT

## 2025-08-13 PROCEDURE — 83735 ASSAY OF MAGNESIUM: CPT

## 2025-08-13 PROCEDURE — 83880 ASSAY OF NATRIURETIC PEPTIDE: CPT

## 2025-08-13 PROCEDURE — 80053 COMPREHEN METABOLIC PANEL: CPT

## 2025-08-13 PROCEDURE — 93005 ELECTROCARDIOGRAM TRACING: CPT | Performed by: EMERGENCY MEDICINE

## 2025-08-13 PROCEDURE — 96374 THER/PROPH/DIAG INJ IV PUSH: CPT

## 2025-08-13 PROCEDURE — 83690 ASSAY OF LIPASE: CPT

## 2025-08-13 PROCEDURE — 84484 ASSAY OF TROPONIN QUANT: CPT

## 2025-08-13 PROCEDURE — 6360000002 HC RX W HCPCS: Performed by: EMERGENCY MEDICINE

## 2025-08-13 PROCEDURE — 85379 FIBRIN DEGRADATION QUANT: CPT

## 2025-08-13 RX ORDER — ACETAMINOPHEN 325 MG/1
650 TABLET ORAL ONCE
Status: COMPLETED | OUTPATIENT
Start: 2025-08-13 | End: 2025-08-13

## 2025-08-13 RX ORDER — ONDANSETRON 2 MG/ML
4 INJECTION INTRAMUSCULAR; INTRAVENOUS ONCE
Status: COMPLETED | OUTPATIENT
Start: 2025-08-13 | End: 2025-08-13

## 2025-08-13 RX ADMIN — ACETAMINOPHEN 650 MG: 325 TABLET ORAL at 23:01

## 2025-08-13 RX ADMIN — ONDANSETRON 4 MG: 2 INJECTION, SOLUTION INTRAMUSCULAR; INTRAVENOUS at 23:00

## 2025-08-13 ASSESSMENT — PAIN DESCRIPTION - PAIN TYPE: TYPE: ACUTE PAIN

## 2025-08-13 ASSESSMENT — PAIN - FUNCTIONAL ASSESSMENT: PAIN_FUNCTIONAL_ASSESSMENT: 0-10

## 2025-08-14 VITALS
SYSTOLIC BLOOD PRESSURE: 125 MMHG | WEIGHT: 269 LBS | RESPIRATION RATE: 18 BRPM | TEMPERATURE: 98.3 F | OXYGEN SATURATION: 99 % | BODY MASS INDEX: 43.23 KG/M2 | HEIGHT: 66 IN | DIASTOLIC BLOOD PRESSURE: 82 MMHG | HEART RATE: 85 BPM

## 2025-08-14 LAB
CK SERPL-CCNC: 69 U/L (ref 26–192)
EKG ATRIAL RATE: 101 BPM
EKG DIAGNOSIS: NORMAL
EKG P AXIS: 53 DEGREES
EKG P-R INTERVAL: 150 MS
EKG Q-T INTERVAL: 348 MS
EKG QRS DURATION: 80 MS
EKG QTC CALCULATION (BAZETT): 451 MS
EKG R AXIS: 59 DEGREES
EKG T AXIS: 56 DEGREES
EKG VENTRICULAR RATE: 101 BPM
MAGNESIUM SERPL-MCNC: 2.12 MG/DL (ref 1.8–2.4)
TROPONIN, HIGH SENSITIVITY: <6 NG/L (ref 0–14)

## 2025-08-14 PROCEDURE — 82550 ASSAY OF CK (CPK): CPT

## 2025-08-14 PROCEDURE — 84484 ASSAY OF TROPONIN QUANT: CPT

## 2025-08-14 PROCEDURE — 93010 ELECTROCARDIOGRAM REPORT: CPT | Performed by: INTERNAL MEDICINE

## 2025-08-14 RX ORDER — ACETAMINOPHEN 500 MG
500 TABLET ORAL 4 TIMES DAILY PRN
Qty: 120 TABLET | Refills: 0 | Status: SHIPPED | OUTPATIENT
Start: 2025-08-14

## (undated) DEVICE — SOLUTION IV 1000ML 0.9% SOD CHL PH 5 INJ USP VIAFLX PLAS

## (undated) DEVICE — BLADELESS OBTURATOR: Brand: WECK VISTA

## (undated) DEVICE — TIP COVER ACCESSORY

## (undated) DEVICE — FORCEPS BX L240CM JAW DIA2.8MM L CAP W/ NDL MIC MESH TOOTH

## (undated) DEVICE — Device

## (undated) DEVICE — CONMED SCOPE SAVER BITE BLOCK, 20X27 MM: Brand: SCOPE SAVER

## (undated) DEVICE — LIQUIBAND RAPID ADHESIVE 36/CS 0.8ML: Brand: MEDLINE

## (undated) DEVICE — TROCAR: Brand: KII FIOS FIRST ENTRY

## (undated) DEVICE — COLUMN DRAPE

## (undated) DEVICE — SOLUTION IRRIG 1000ML STRL H2O USP PLAS POUR BTL

## (undated) DEVICE — ENDOSCOPIC KIT 2 12 FT OP4 DE2 GWN SYR

## (undated) DEVICE — TISSUE RETRIEVAL SYSTEM: Brand: INZII RETRIEVAL SYSTEM

## (undated) DEVICE — ARM DRAPE

## (undated) DEVICE — SUTURE VICRYL + SZ 0 L27IN ABSRB VLT L26MM UR-6 5/8 CIR VCP603H

## (undated) DEVICE — GLOVE,SURG,SENSICARE SLT,LF,PF,7: Brand: MEDLINE

## (undated) DEVICE — STOPCOCK 3-WAY INTRAVENOUS

## (undated) DEVICE — MARYLAND BIPOLAR FORCEPS: Brand: ENDOWRIST

## (undated) DEVICE — C-ARM: Brand: UNBRANDED

## (undated) DEVICE — CATHETER CHOLGM 4.5FR L18IN W/ MTL SUPP TB

## (undated) DEVICE — MEDIUM-LARGE CLIP APPLIER: Brand: ENDOWRIST

## (undated) DEVICE — ELECTRODE,ECG,STRESS,FOAM,3PK: Brand: MEDLINE

## (undated) DEVICE — CANNULA NSL AD TBNG L7FT PVC STR NONFLARED PRNG O2 DEL W STD

## (undated) DEVICE — FENESTRATED BIPOLAR FORCEPS: Brand: ENDOWRIST

## (undated) DEVICE — SUTURE VICRYL + SZ 3-0 L18IN ABSRB UD SH 1/2 CIR TAPERCUT NDL VCP864D

## (undated) DEVICE — SEAL

## (undated) DEVICE — REDUCER: Brand: ENDOWRIST

## (undated) DEVICE — SYNCHROSEAL: Brand: DA VINCI ENERGY

## (undated) DEVICE — AIRSEAL BIFURCATED SMOKE EVAC FILTERED TUBE SET: Brand: AIRSEAL

## (undated) DEVICE — MONOPOLAR CURVED SCISSORS: Brand: ENDOWRIST